# Patient Record
Sex: FEMALE | Race: WHITE | NOT HISPANIC OR LATINO | Employment: FULL TIME | URBAN - METROPOLITAN AREA
[De-identification: names, ages, dates, MRNs, and addresses within clinical notes are randomized per-mention and may not be internally consistent; named-entity substitution may affect disease eponyms.]

---

## 2017-01-02 ENCOUNTER — TELEPHONE (OUTPATIENT)
Dept: FAMILY MEDICINE | Facility: CLINIC | Age: 47
End: 2017-01-02

## 2017-01-02 DIAGNOSIS — N99.81 INTRAOPERATIVE BLADDER INJURY: Primary | ICD-10-CM

## 2017-01-02 NOTE — TELEPHONE ENCOUNTER
[facility]; f/u 12/31/16 Dx Urinary Incontinence, Dysmenorrhea, Menorrhagia, Post-Op Pain ED/IP 0/1  [phone] (home) none (work)

## 2017-01-03 ENCOUNTER — HOSPITAL ENCOUNTER (OUTPATIENT)
Dept: GENERAL RADIOLOGY | Facility: CLINIC | Age: 47
Discharge: HOME | End: 2017-01-03
Attending: UROLOGY | Admitting: UROLOGY
Payer: COMMERCIAL

## 2017-01-03 PROCEDURE — 74430 CONTRAST X-RAY BLADDER: CPT

## 2017-01-03 PROCEDURE — 25500064 ZZH RX 255 OP 636: Performed by: RADIOLOGY

## 2017-01-03 RX ADMIN — DIATRIZOATE MEGLUMINE 50 ML: 300 INJECTION, SOLUTION INTRAVENOUS at 15:26

## 2017-01-03 NOTE — TELEPHONE ENCOUNTER
ED / Discharge Outreach Protocol    Patient Contact    Attempt # none    Was call answered?  Planned surgery. Hysterectomy. Followed by OB/GYN  Tona Orellana RN

## 2017-01-04 ENCOUNTER — OFFICE VISIT (OUTPATIENT)
Dept: UROLOGY | Facility: CLINIC | Age: 47
End: 2017-01-04
Payer: COMMERCIAL

## 2017-01-04 VITALS
BODY MASS INDEX: 30.31 KG/M2 | SYSTOLIC BLOOD PRESSURE: 120 MMHG | HEART RATE: 64 BPM | WEIGHT: 200 LBS | HEIGHT: 68 IN | DIASTOLIC BLOOD PRESSURE: 80 MMHG

## 2017-01-04 DIAGNOSIS — N39.3 SUI (STRESS URINARY INCONTINENCE, FEMALE): Primary | ICD-10-CM

## 2017-01-04 DIAGNOSIS — N39.3 SUI (STRESS URINARY INCONTINENCE, FEMALE): ICD-10-CM

## 2017-01-04 PROCEDURE — 99214 OFFICE O/P EST MOD 30 MIN: CPT | Mod: 25 | Performed by: UROLOGY

## 2017-01-04 PROCEDURE — 52000 CYSTOURETHROSCOPY: CPT | Performed by: UROLOGY

## 2017-01-04 RX ORDER — CIPROFLOXACIN 500 MG/1
500 TABLET, FILM COATED ORAL DAILY
Qty: 1 TABLET | Refills: 0 | Status: SHIPPED | OUTPATIENT
Start: 2017-01-04 | End: 2017-01-18

## 2017-01-04 NOTE — PATIENT INSTRUCTIONS
"     AFTER YOUR CYSTOSCOPY         You have just completed a cystoscopy, or \"cysto\", which allowed your physician to learn more about your bladder (or to remove a stent placed after surgery). We suggest that you continue to avoid caffeine, fruit juice, and alcohol for the next 24 hours, however, you are encouraged to return to your normal activities.       A few things that are considered normal after your cystoscopy:    * small amount of bleeding (or spotting) that clears within the next 24 hours    * slight burning sensation with urination    * sensation to of needing to avoid more frequently    * the feeling of \"air\" in your urine    * mild discomfort that is relieved with Acetaminophen (Example:Tylenol)        Please contact our office promptly if you:    * develop a fever above 101 degrees    * are unable to urinate, during non-office hours seek Medical Attention.     "

## 2017-01-04 NOTE — Clinical Note
1/4/2017       RE: Crista Ma  [address] [place] [address]     Dear Colleague,    Thank you for referring your patient, Crista Ma, to the [facility] at [facility]. Please see a copy of my visit note below.    This very pleasant 46-year-old lady had a hysterectomy last week,which was complicated by a low bladder injury.  At the time I was able to pass ureteral catheters to exclude the possibility of ureteral injury, although I could not do retrograde pyelograms as the table the patient was on would not permit that.  I then was able to close this difficult hole low down from behind such that when we finished and filled the bladder with methylene blue there was no apparent leakage.  Subsequent to however she did begin to leak through the vagina we left the catheter  In, although I did take the ureteral catheters out about 3 days after surgery.  She now has a catheter in and is leaking and a cystogram today does show what appears to be a leak into the vagina and I therefore decided to cystoscope the patient to determine the precise location of the leak.    Procedure cystoscopy.with exchange of catheter  Surgeon    Barbara.  Anesthesia.  Local anesthesia  Description.  With the patient in the dorsal lithotomy position, and with the genital area prepped and draped in Kussman fashion, a flexible cystoscope was passed through the urethra which was intact into the bladder.  The interior the bladder was carefully inspected, the fistula tract could be seen just above the trigone on the posterior wall of the bladder, sutures could be seen immediately behind it.  I could palpate a hole in the vagina right at the tip of my finger on  Vaginal examination.  Conclusions.  She has a significant communication still between the bladder and the vagina.  I carefully explained the situation to the patient in detail.  I would recommend she  meet with Dr.Nisreen Boyd who has a great deal of expertise with the laparoscopic management of these difficult fistulas.  We'll arrange that as soon as possible.  I explained the entire situation carefully with the patient in detail today.  I answered many questions.  Plan.  To see Dr. Boyd for her opinion and management.    Time.  25 minutes was spent in addition to cystoscopy catheter exchange in order to discuss this very complex situation    Again, thank you for allowing me to participate in the care of your patient.      Sincerely,    Noel Jimenez MD

## 2017-01-04 NOTE — Clinical Note
Date:January 5, 2017      Patient was self referred, no letter generated. Do not send.        [facility] Information

## 2017-01-04 NOTE — PROGRESS NOTES
This very pleasant 46-year-old lady had a hysterectomy last week,which was complicated by a low bladder injury.  At the time I was able to pass ureteral catheters to exclude the possibility of ureteral injury, although I could not do retrograde pyelograms as the table the patient was on would not permit that.  I then was able to close this difficult hole low down from behind such that when we finished and filled the bladder with methylene blue there was no apparent leakage.  Subsequent to however she did begin to leak through the vagina we left the catheter  In, although I did take the ureteral catheters out about 3 days after surgery.  She now has a catheter in and is leaking and a cystogram today does show what appears to be a leak into the vagina and I therefore decided to cystoscope the patient to determine the precise location of the leak.    Procedure cystoscopy.with exchange of catheter  Surgeon    Barbara.  Anesthesia.  Local anesthesia  Description.  With the patient in the dorsal lithotomy position, and with the genital area prepped and draped in Kussman fashion, a flexible cystoscope was passed through the urethra which was intact into the bladder.  The interior the bladder was carefully inspected, the fistula tract could be seen just above the trigone on the posterior wall of the bladder, sutures could be seen immediately behind it.  I could palpate a hole in the vagina right at the tip of my finger on  Vaginal examination.  Conclusions.  She has a significant communication still between the bladder and the vagina.  I carefully explained the situation to the patient in detail.  I would recommend she meet with Dr.Nisreen Boyd who has a great deal of expertise with the laparoscopic management of these difficult fistulas.  We'll arrange that as soon as possible.  I explained the entire situation carefully with the patient in detail today.  I answered many questions.  Plan.  To see Dr. Boyd for her opinion  and management.    Time.  25 minutes was spent in addition to cystoscopy catheter exchange in order to discuss this very complex situation

## 2017-01-04 NOTE — MR AVS SNAPSHOT
"              After Visit Summary   1/4/2017    Crista Ma    MRN: [id number]           Patient Information     Date Of Birth          1970        Visit Information        Provider Department      1/4/2017 2:40 PM Noel Jimenez MD; [facility] [place]        Today's Diagnoses     HARRY (stress urinary incontinence, female)           Care Instructions         AFTER YOUR CYSTOSCOPY         You have just completed a cystoscopy, or \"cysto\", which allowed your physician to learn more about your bladder (or to remove a stent placed after surgery). We suggest that you continue to avoid caffeine, fruit juice, and alcohol for the next 24 hours, however, you are encouraged to return to your normal activities.       A few things that are considered normal after your cystoscopy:    * small amount of bleeding (or spotting) that clears within the next 24 hours    * slight burning sensation with urination    * sensation to of needing to avoid more frequently    * the feeling of \"air\" in your urine    * mild discomfort that is relieved with Acetaminophen (Example:Tylenol)        Please contact our office promptly if you:    * develop a fever above 101 degrees    * are unable to urinate, during non-office hours seek Medical Attention.           Follow-ups after your visit        Your next 10 appointments already scheduled     Jan 19, 2017 11:00 AM   XR CYSTOGRAM with SHXR5   [facility]iology ([facility])    [address] [place] [address] [phone]           Please bring a list of your current medicines to your exam. (Include vitamins, minerals and over-thecounter medicines.) Leave your valuables at home.  Tell your doctor if there is a chance you may be pregnant.  You do not need to do anything special for this exam.            Jan 19, 2017  1:00 PM   Return Visit with Noel Jimenez MD   [facility] " "[facility] ([facility])    [address]  [address]  [facility] [address] [phone]              Who to contact     If you have questions or need follow up information about today's clinic visit or your schedule please contact [facility] directly at [phone].  Normal or non-critical lab and imaging results will be communicated to you by MyChart, letter or phone within 4 business days after the clinic has received the results. If you do not hear from us within 7 days, please contact the clinic through [employer] or phone. If you have a critical or abnormal lab result, we will notify you by phone as soon as possible.  Submit refill requests through [employer] or call your pharmacy and they will forward the refill request to us. Please allow 3 business days for your refill to be completed.          Additional Information About Your Visit        [employer] Information     [employer] gives you secure access to your electronic health record. If you see a primary care provider, you can also send messages to your care team and make appointments. If you have questions, please call your primary care clinic.  If you do not have a primary care provider, please call [phone] and they will assist you.        Care EveryWhere ID     This is your Care EveryWhere ID. This could be used by other organizations to access your [place] medical records  [id number]        Your Vitals Were     Pulse Height BMI (Body Mass Index) Last Period          64 1.727 m (5' 8\") 30.42 kg/m2 12/06/2016 (Exact Date)         Blood Pressure from Last 3 Encounters:   01/04/17 120/80   12/31/16 100/61   06/02/16 112/76    Weight from Last 3 Encounters:   01/04/17 90.719 kg (200 lb)   12/31/16 98.7 kg (217 lb 9.5 oz)   06/02/16 96.48 kg (212 lb 11.2 oz)              We Performed the Following     UA without Microscopic [[id number]]        Primary Care Provider    None Specified       No " primary provider on file.        Thank you!     Thank you for choosing [facility] [place]  for your care. Our goal is always to provide you with excellent care. Hearing back from our patients is one way we can continue to improve our services. Please take a few minutes to complete the written survey that you may receive in the mail after your visit with us. Thank you!             Your Updated Medication List - Protect others around you: Learn how to safely use, store and throw away your medicines at www.disposemymeds.org.          This list is accurate as of: 1/4/17  4:50 PM.  Always use your most recent med list.                   Brand Name Dispense Instructions for use    albuterol 108 (90 BASE) MCG/ACT Inhaler   Generic drug:  albuterol      Inhale 1-2 puffs into the lungs every 4 hours as needed       budesonide-formoterol 160-4.5 MCG/ACT Inhaler    SYMBICORT     Inhale 1-2 puffs into the lungs 2 times daily       cyclobenzaprine 5 MG tablet    FLEXERIL    120 tablet    Take 1-2 tablets (5-10 mg) by mouth every 8 hours as needed for muscle spasms       opium-belladonna 30-16.2 MG per suppository    B&O SUPPRETTES    80 suppository    Place 1 suppository rectally every 6 hours as needed for moderate pain       oxyCODONE 5 MG IR tablet    ROXICODONE    60 tablet    Take 1-2 tablets (5-10 mg) by mouth every 3 hours as needed for moderate to severe pain       SINGULAIR PO      Take 10 mg by mouth At Bedtime       tolterodine 2 MG 24 hr capsule    DETROL LA    30 capsule    Take 1 capsule (2 mg) by mouth daily

## 2017-01-06 ENCOUNTER — TELEPHONE (OUTPATIENT)
Dept: UROLOGY | Facility: CLINIC | Age: 47
End: 2017-01-06

## 2017-01-06 ENCOUNTER — PRE VISIT (OUTPATIENT)
Dept: UROLOGY | Facility: CLINIC | Age: 47
End: 2017-01-06

## 2017-01-06 NOTE — TELEPHONE ENCOUNTER
[facility] Call Center    Phone Message    Name of Caller: Crista    Phone Number: Cell number on file:    Telephone Information:   Mobile [phone]       Best time to return call: ANY    May a detailed message be left on voicemail: yes    Relation to patient: Self    Reason for Call: Other: Call Back     Action Taken: Message routed to:  Adult Clinics: Urology p [id number]

## 2017-01-06 NOTE — TELEPHONE ENCOUNTER
PREVISIT INFORMATION                                                    Crista Ma scheduled for future visit at [facility] specialty clinics.    Patient is scheduled to see Dr. Boyd on 1/9/17  Reason for visit: Vesicovaginal fistula  Referring provider: Noel Jimenez   Has patient seen previous specialist? Yes  Medical Records:  Available in chart.  Patient was previously seen at a [facility] or [facility] facility.     REVIEW                                                      New patient packet mailed to patient: No  Medication reconciliation complete: No      PLAN/FOLLOW-UP NEEDED                                                      Patient Reminders Given:    Informed patient to bring an updated list of allergies, medications, pharmacy details and insurance information. Directed patient to come to the 2nd floor, check-in #4 for their appointment. Informed patient to call back if appointment needs to be cancelled or rescheduled at [phone].    Reminded patient to bring any outside records regarding this appointment or have them faxed to clinic at [phone].    Reminded patient to complete and bring in urology questionnaire. Also for patient to please come with a full bladder and to ask , if early to get staff member for sample.

## 2017-01-09 ENCOUNTER — HOSPITAL ENCOUNTER (EMERGENCY)
Facility: CLINIC | Age: 47
Discharge: HOME | End: 2017-01-09
Attending: EMERGENCY MEDICINE | Admitting: EMERGENCY MEDICINE
Payer: COMMERCIAL

## 2017-01-09 ENCOUNTER — OFFICE VISIT (OUTPATIENT)
Dept: UROLOGY | Facility: CLINIC | Age: 47
End: 2017-01-09
Payer: COMMERCIAL

## 2017-01-09 VITALS
TEMPERATURE: 97.7 F | HEIGHT: 68 IN | OXYGEN SATURATION: 96 % | SYSTOLIC BLOOD PRESSURE: 133 MMHG | DIASTOLIC BLOOD PRESSURE: 83 MMHG

## 2017-01-09 VITALS — DIASTOLIC BLOOD PRESSURE: 93 MMHG | SYSTOLIC BLOOD PRESSURE: 127 MMHG | HEART RATE: 97 BPM | TEMPERATURE: 97.1 F

## 2017-01-09 DIAGNOSIS — T83.9XXA FOLEY CATHETER PROBLEM, INITIAL ENCOUNTER (H): ICD-10-CM

## 2017-01-09 DIAGNOSIS — N82.0 VVF (VESICOVAGINAL FISTULA): Primary | ICD-10-CM

## 2017-01-09 PROCEDURE — 99244 OFF/OP CNSLTJ NEW/EST MOD 40: CPT | Mod: 25 | Performed by: UROLOGY

## 2017-01-09 PROCEDURE — 51702 INSERT TEMP BLADDER CATH: CPT

## 2017-01-09 PROCEDURE — 99282 EMERGENCY DEPT VISIT SF MDM: CPT

## 2017-01-09 PROCEDURE — 52000 CYSTOURETHROSCOPY: CPT | Mod: 58 | Performed by: UROLOGY

## 2017-01-09 PROCEDURE — 99283 EMERGENCY DEPT VISIT LOW MDM: CPT

## 2017-01-09 RX ORDER — CEFAZOLIN SODIUM 1 G/3ML
1 INJECTION, POWDER, FOR SOLUTION INTRAMUSCULAR; INTRAVENOUS SEE ADMIN INSTRUCTIONS
Status: CANCELLED | OUTPATIENT
Start: 2017-01-09

## 2017-01-09 ASSESSMENT — PAIN SCALES - GENERAL: PAINLEVEL: NO PAIN (0)

## 2017-01-09 ASSESSMENT — ENCOUNTER SYMPTOMS: FEVER: 0

## 2017-01-09 NOTE — ED AVS SNAPSHOT
Emergency Department    [address] [facility] [address]    Phone:  [phone]    Fax:  [phone]                                       Crista Ma   MRN: [id number]    Department:   Emergency Department   Date of Visit:  1/9/2017           Patient Information     Date Of Birth          1970        Your diagnoses for this visit were:     Neri catheter problem, initial encounter (H)        You were seen by Patricia Mcmanus MD.      Follow-up Information     Follow up with  Emergency Department.    Specialty:  EMERGENCY MEDICINE    Why:  If symptoms worsen    Contact information:    [address] [facility] [address] [phone]        Discharge Instructions       Make sure that it is continuing to drain overnight  If have problems tomorrow, call urology to have them look at the neri, otherwise return to emergency department    Future Appointments        Provider Department Dept Phone Center    1/19/2017 11:00 AM [facility] XRAY ROOM 5 [facility] Radiology [phone] [facility]    1/19/2017 1:00 PM Noel Jimenez MD [facility] [place] [phone] [facility]      24 Hour Appointment Hotline       To make an appointment at any [facility], call [phone] ([phone]). If you don't have a family doctor or clinic, we will help you find one. [place] clinics are conveniently located to serve the needs of you and your family.             Review of your medicines      Our records show that you are taking the medicines listed below. If these are incorrect, please call your family doctor or clinic.        Dose / Directions Last dose taken    albuterol 108 (90 BASE) MCG/ACT Inhaler   Dose:  1-2 puff   Generic drug:  albuterol        Inhale 1-2 puffs into the lungs every 4 hours as needed   Refills:  3        budesonide-formoterol 160-4.5 MCG/ACT Inhaler   Commonly known as:  SYMBICORT   Dose:  1-2  puff        Inhale 1-2 puffs into the lungs 2 times daily   Refills:  0        ciprofloxacin 500 MG tablet   Commonly known as:  CIPRO   Dose:  500 mg   Quantity:  1 tablet        Take 1 tablet (500 mg) by mouth daily   Refills:  0        cyclobenzaprine 5 MG tablet   Commonly known as:  FLEXERIL   Dose:  5-10 mg   Quantity:  120 tablet        Take 1-2 tablets (5-10 mg) by mouth every 8 hours as needed for muscle spasms   Refills:  1        opium-belladonna 30-16.2 MG per suppository   Commonly known as:  B&O SUPPRETTES   Dose:  30 mg   Quantity:  80 suppository        Place 1 suppository rectally every 6 hours as needed for moderate pain   Refills:  0        oxyCODONE 5 MG IR tablet   Commonly known as:  ROXICODONE   Dose:  5-10 mg   Quantity:  60 tablet        Take 1-2 tablets (5-10 mg) by mouth every 3 hours as needed for moderate to severe pain   Refills:  0        SINGULAIR PO   Dose:  10 mg        Take 10 mg by mouth At Bedtime   Refills:  0        tolterodine 2 MG 24 hr capsule   Commonly known as:  DETROL LA   Dose:  2 mg   Quantity:  30 capsule        Take 1 capsule (2 mg) by mouth daily   Refills:  0                Orders Needing Specimen Collection     None      Pending Results     No orders found from 1/8/2017 to 1/10/2017.            Pending Culture Results     No orders found from 1/8/2017 to 1/10/2017.             Test Results from your hospital stay            Clinical Quality Measure: Blood Pressure Screening     Your blood pressure was checked while you were in the emergency department today. The last reading we obtained was  BP: 133/83 mmHg . Please read the guidelines below about what these numbers mean and what you should do about them.  If your systolic blood pressure (the top number) is less than 120 and your diastolic blood pressure (the bottom number) is less than 80, then your blood pressure is normal. There is nothing more that you need to do about it.  If your systolic blood pressure (the  top number) is 120-139 or your diastolic blood pressure (the bottom number) is 80-89, your blood pressure may be higher than it should be. You should have your blood pressure rechecked within a year by a primary care provider.  If your systolic blood pressure (the top number) is 140 or greater or your diastolic blood pressure (the bottom number) is 90 or greater, you may have high blood pressure. High blood pressure is treatable, but if left untreated over time it can put you at risk for heart attack, stroke, or kidney failure. You should have your blood pressure rechecked by a primary care provider within the next 4 weeks.  If your provider in the emergency department today gave you specific instructions to follow-up with your doctor or provider even sooner than that, you should follow that instruction and not wait for up to 4 weeks for your follow-up visit.        Thank you for choosing [facility]       Thank you for choosing [facility] for your care. Our goal is always to provide you with excellent care. Hearing back from our patients is one way we can continue to improve our services. Please take a few minutes to complete the written survey that you may receive in the mail after you visit with us. Thank you!        [employer] Information     [employer] gives you secure access to your electronic health record. If you see a primary care provider, you can also send messages to your care team and make appointments. If you have questions, please call your primary care clinic.  If you do not have a primary care provider, please call [phone] and they will assist you.        Care EveryWhere ID     This is your Care EveryWhere ID. This could be used by other organizations to access your [place] [place] medical records  [id number]        After Visit Summary       This is your record. Keep this with you and show to your community pharmacist(s) and doctor(s) at your next visit.

## 2017-01-09 NOTE — ED AVS SNAPSHOT
Emergency Department    [address] [place] [address]    Phone:  [phone]    Fax:  [phone]                                       Crista Ma   MRN: [id number]    Department:   Emergency Department   Date of Visit:  1/9/2017           After Visit Summary Signature Page     I have received my discharge instructions, and my questions have been answered. I have discussed any challenges I see with this plan with the nurse or doctor.    ..........................................................................................................................................  Patient/Patient Representative Signature      ..........................................................................................................................................  Patient Representative Print Name and Relationship to Patient    ..................................................               ................................................  Date                                            Time    ..........................................................................................................................................  Reviewed by Signature/Title    ...................................................              ..............................................  Date                                                            Time

## 2017-01-09 NOTE — PATIENT INSTRUCTIONS
Please follow up with Dr. Boyd on 2/1/17 at the [facility]. Someone will contact you to schedule this appointment. If you have any questions regarding this appointment call [phone].      After Your Cystoscopy    What happens after the exam?    You may go back to your normal diet and activity as you feel ready, unless your doctor tells you not to.    For the next two days, you may notice:    Some blood in your urine  Some burning when you urinate (use the toilet)  An urge to urinate more often  Bladder spasms    These are normal after the procedure and should go away after a day or two.  To relieve these problems drink 6 to 8 large glasses of water each day (includes drinks at meals) as this will help clear the urine.  Take warm baths to relieve pain and bladder spasms.  Do not add anything to the bath water.  You may also take Tylenol (acetaminophen) for pain if needed.    When should I call my doctor?    A fever over 100F (38C) for more than a day. (Before you call the doctor, check your temperature under your tongue)  Chills  Failure to urinate: No urine comes out when you try to use the toilet. (Try soaking in a bathtub full of warm water. If still no urine, call your doctor)  A lot of blood in the urine, or blood clots larger than a nickel  Pain in the back or belly area (abdomen)  Pain or spasms that are not relieved by warm tub baths and pain medicine  Severe pain, burning or other problems while passing urine  Pain that gets worse after two days      Surgery Instructions    Always follow your surgeon s instructions. If you don t, your surgery could be cancelled. Please use the following checklist.  Your surgery is on: The surgery scheduler will contact you within 1 week of your consult with the surgeon. If you do not hear from them, please call the clinic or RN Care Coordinator for your provider.    Time: Prearrival times can vary depending on location/type of surgery.  [place] - 2 hour  pre-arrival  [place] - 1 hour pre-arrival    Note:  These times may change. A nurse will call you before surgery to confirm. If you have not received a call or if you have more questions, please call us on the working day before your surgery:  ? [place]: [phone] or [phone] (9am to 5:30pm)  ? [facility] [phone] (7am to 4pm)  Prior to surgery  ? Have a pre-op physical exam with your Primary Doctor within 30 days of surgery  - Ask your doctor to send all of your results to the surgery center/hospital before surgery. Your doctor also may ask you to bring the results with you on the day of surgery.  - Tell your doctor if:  - You are allergic to latex or rubber (latex and rubber gloves are often used in medical care).  - You are taking any medicines (including aspirin), vitamins, or herbal products. You may need to stop taking some medicines before surgery.  - You have any medical problems (allergies, diabetes, or heart disease, for example).  - You have a pacemaker or an AICD (automatic implanted cardiac defibrillator). If you do, please bring the ID card with you on the day of surgery.  - You are a smoker. People who smoke have a higher risk of infection after surgery. Ask your doctor how you can quit smoking.  - If you Primary Doctor is not within the [facility] system, you will need to have your pre-op physical faxed to us to be scanned into your chart.  - [place]: [phone] or [phone]  - [facility]): [phone]  ? Call your insurance company. Ask if you need pre-approval for your surgery. If you do not have insurance, please let us know. If you wish to speak to the , please alert the clinic staff so this can be arranged.  ? Arrange for someone to drive you home after surgery.  will need to be a responsible adult (18 years or older) that will provide transportation to and from surgery and stay in the waiting room during your surgery. You may  not drive yourself or take public transportation to and from surgery.  ? Arrange for someone to stay with you for 24 hours after you go home. This person must be a responsible adult (18 years or older).  ? Call your surgeon or their nurse if there is any change in your health (cold, flu, infections, hospitalizations).  ? Do not smoke, drink alcohol, or take over-the-counter medicine for 24 hours before and after surgery.  ? If you take prescribed drugs, you may need to stop them until after the surgery.  Discuss what medications to take or not take prior to surgery with your Primary Doctor at your pre-op physical. Avoid over-the-counter blood-thinning medications such as Aspirin, Ibuprofen, vitamin E, or fish oil 7 days prior to surgery (unless otherwise directed by your Primary Doctor). Tylenol is a good alternative for mild pain relief prior to surgery.  ? Eating and drinking guidelines prior to surgery:  - Stop all solid food consumption 8 hours prior to surgery  - You may drink clear liquids up to 2 hours prior to surgery (water, fruit juices without pulp, jello, tea/coffee without creamer, sports drinks, clear-fat free broth (bouillon or consomme), popsicles (without milk, bits of fruit, or seeds/nuts)  ? Follow instructions given for showering or bathing before surgery.    - Use 8 ounces of antiseptic surgical soap, like:  - Hibiclens, Scrub Care, or Exidine  - You can find it at your local pharmacy, clinic, or retail store. If you have trouble, ask your pharmacist to help you find the right substitute.  - Please wash with one of the above soaps twice before coming to the hospital for your surgery. This will decrease bacteria (germs) on your skin. It will also help reduce your chance of infection after surgery.  - Items you will need for showering:  - 4 newly washed washcloths  - 2 newly washed towels  - 8 ounces of one of the above soaps  - Following these instructions:  - The evening before surgery: Shower  or bathe as you normally would, using your regular soap and a clean washcloth. Give special attention to places where your incision (surgical cut) or catheters will be. This includes your groin area. Rinse well. You may wash your hair with your regular shampoo. Next, wash your body with 4 ounces of the antiseptic soap. Use a clean, damp washcloth and gently clean your body (from the chin down). If your surgery involves your head, use the special soap on your head and scalp. Rinse well and dry off using a newly washed towel.  - The morning of surgery: Repeat the same process as the evening shower.  - Other suggestions:    Do not shave within 12 inches of your incision (surgical cut) area for at least 3 days before surgery. Shaving can make small cuts in the skin. This puts you at higher risk of infection.    Wear freshly washed pajamas or clothing after your evening shower.    Wear freshly washed clothes the day of surgery.    Wash and change your bed sheets the day before surgery to have clean bed sheets after your shower and when you get home from surgery.    If you have trouble washing all areas, make sure someone helps you.    Don't use any deodorant, lotion or powder after your shower.    Women who are menstruating should wear a fresh sanitary pad to the hospital.  ? Do not wear or add deodorant, cologne, lotion, makeup, nail polish or jewelry to surgery. If you wear fake nails, please remove at least one nail before coming to surgery (an oxygen monitor needs to be placed on your finger during surgery).  ? Bring these items to the surgery center/hospital:  - Insurance card  - Money for parking and co-pays, if needed  - A list of all the medicines you take. Include vitamins, minerals, herbs, and over-the-counter drugs.  Note any drug allergies.  - A copy of your advance health care directive, if you have one. This tells us what treatment you would want--and who would make health care decisions--if you could no  longer speak for yourself. You may request this form in advance or download it from [email or web address].  - A case for glasses, contact lenses, hearing aids, or dentures.  - Your inhaler or CPAP machine, if you use these at home.  ? Leave extra cash, jewelry, and other valuables at home.  When you arrive  When you get to the surgery center/hospital, you will:  ? Check in. If you are under age 18, you must be with a parent or legal guardian.  ? Sign consent forms, if you haven t already. These forms state that you know the risks and benefits of surgery. When you sign the forms, you give us permission to do the surgery. Do not sign them unless you understand what will happen during and after your surgery. If you have any questions about your surgery, ask to speak with your doctor before you sign the forms. If you don t understand the answers, ask again.  ? Receive a copy of the Patient s Bill of Rights. If you do not receive a copy, please ask for one.  ? Change into hospital clothes. Your belongings will be placed in a bag. We will return them to you after surgery.  ? Meet with the anesthesia provider. He or she will tell you what kind of anesthesia (medicine) will be used to keep you comfortable during surgery.  Remember: it s okay to remind doctors and nurses to wash their hands before touching you.  In most cases, your surgeon will use a marker to write his or her initials on the surgery site. This ensures that the exact site is operated on.  For safety reasons, we will ask you the same questions many times. For example, we may ask your name and birth date over and over again.  Friends and family can stay with you until it s time for surgery. While you re in surgery, they will be in the waiting area. Please note that cell phones are not allowed in some patient care areas.  If you have questions about what will happen in the operating room, talk to your care team.  After surgery  We will move you to a recovery  room, where we will watch you closely. If you have any pain or discomfort, tell your nurse. He or she will try to make you comfortable.  If you are staying overnight, we will move you to your hospital room after you are awake.  If you are going home, we will move you to another room. Friends and family may be able to join you. The length of time you spend in recovery depend on the type of medicine you received, your medical condition, the type of surgery you had, or your response to the anesthesia given during your procedure.  When you are discharged from the recovery room, the nurses will review instructions with you and your caregiver.  ? Please wash your hands every time you touch the wound or change bandages or dressings.  ? Do not submerge the wound in water.  You may not use a bathtub or hot tub until the wound is closed. The wait time frame is generally 2-3 weeks, but any open area can be a source of incoming bacteria, so it is better to be on the safe side and avoid water submersion until your wound is fully healed.  ? You may take a shower 24 hours after surgery. Double check with your surgeon if it is OK for water to run over the wound, whether it has been sutured, stapled, glued, or is open. You may gently wash the wound using the antiseptic soap provided for your pre-surgery showering (do not use a washcloth). Any mild soap will work as well.  ? Many surgical wounds will have small white strips of tape on them called steri-strips.  Do not remove these. The edges will curl and fall off within 7-10 days with normal showering.  ? If you are going home with sutures (stitches) or staples, you must return to the clinic to have them taken out, usually within 1-2 weeks. Some stitches are dissolvable and do not require removal. Make sure to clarify with your surgeon or surgery nurse reviewing discharge paperwork what kind of sutures you have.  ? Signs and symptoms of infection include:  - Fever, temperature over  101.5   F  - Redness  - Swelling  - Increased pain  - Green or yellow drainage which may or may not have a foul odor  Dealing with pain  A nurse will check your comfort level often during your stay. He or she will work with you to manage your pain.  Remember:  ? All pain is real. There are many ways to control pain. We can help you decide what works best for you.  ? Ask for pain medicine when you need it. Don t try to  tough it out --this can make you feel worse. Always take your medicine as ordered.  ? Medicine doesn t work the same for everyone. If your medicine isn t working, tell your nurse. There may be other medicines or treatments we can try.  Going home  We will let you know when you re ready to leave the surgery center or hospital. Before you leave, we will tell you how to care for yourself at home and prevent infections. If you do not understand something, please say so. We will answer any questions you have. We will then help you get ready to leave.  Remember, you must have a responsible adult (18 years or older) to stay with you 24 hours after you leave the hospital.  Take it easy when you get home. You will need some time to recover--you may be more tired than you realize at first. Rest and relax for at least the first 24 hours at home. You ll feel better and heal faster if you take good care of yourself.  Follow the discharge instructions that are given to you when you leave the surgery center or hospital  Please call the clinic if you experience any problems during regular clinic hours (Monday-Friday 8:00am-5:00pm).  If you experience problems during non-clinic hours, please call the [facility] on-call line at [phone] and ask the  to page the on-call Provider for your specialty. The on-call Provider will call you back and can triage your symptoms and further advise. If you are having an emergency, always call 911 or seek immediate evaluation at the Emergency  Room.  Locations  [facility]  Same-day surgery center - 2nd floor, check-in #5  [address] [place] [address]  [phone]  [email or web address] [place] [place]  [address] [place] [id number]  [phone]   [email or web address]

## 2017-01-09 NOTE — NURSING NOTE
"Crista Ma's goals for this visit include:   Chief Complaint   Patient presents with     Consult     vesicovaginal fistula     She requests these members of her care team be copied on today's visit information: YES -    Referring Provider:  Noel Jimenez MD  [facility]  [address]  [place] [address]    Initial /93 mmHg  Pulse 97  Temp(Src) 97.1  F (36.2  C) (Oral)  LMP 12/06/2016 (Exact Date) Estimated body mass index is 30.42 kg/(m^2) as calculated from the following:    Height as of 1/4/17: 1.727 m (5' 8\").    Weight as of 1/4/17: 90.719 kg (200 lb).  BP completed using cuff size: large      "

## 2017-01-09 NOTE — PROGRESS NOTES
CC:  Vesicovaginal fistula    HPI:  Crista Ma is a 46 year old female asked to be seen in consultation by Dr. Jimenez for the above.  This problem has been going on shortly after a hysterectomy with incidental bladder injury and intraoperative repair.  She has been having a lot of leakage though the vagina and had a cystoscopy by Dr. Jimenez revealing the fistula.  She has otherwise been doing well and her pfannenstiel incision is healing well.  The fistula was described around the trigone area so we decided to perform cystoscopy today for better surgical planning.      Past Medical History   Diagnosis Date     Unspecified asthma(493.90)      on singulair, advair     Allergic rhinitis, cause unspecified      Pneumothorax      spontaneous x 4-as a child r/t pneumonia     Seasonal affective disorder (H)      prozac      Dysmenorrhea        Past Surgical History   Procedure Laterality Date     Hc enlarge breast with implant       [place] teeth       Laparoscopic assisted hysterectomy vaginal N/A 12/27/2016     Procedure: LAPAROSCOPIC ASSISTED HYSTERECTOMY VAGINAL;  Surgeon: Abebe Michelle MD;  Location:  OR     Laparoscopic salpingectomy Bilateral 12/27/2016     Procedure: LAPAROSCOPIC SALPINGECTOMY;  Surgeon: Abebe Michelle MD;  Location:  OR     Cystoscopy N/A 12/27/2016     Procedure: CYSTOSCOPY;  Surgeon: Abebe Michelle MD;  Location:  OR     Hysterectomy total abdominal, salpingectomy N/A 12/27/2016     Procedure: HYSTERECTOMY TOTAL ABDOMINAL, SALPINGECTOMY;  Surgeon: Abebe Michelle MD;  Location:  OR     Repair bladder N/A 12/27/2016     Procedure: REPAIR BLADDER;  Surgeon: Abebe Michelle MD;  Location:  OR       Meds, Allergies, FHx and SHx reviewed per nurse's intake note.    ROS is negative on a 14 point scale except for what is mentioned in the hpi.       PEx:   Blood pressure 127/93, pulse 97,  temperature 97.1  F (36.2  C), temperature source Oral, last menstrual period 12/06/2016.  Data Unavailable, There is no weight on file to calculate BMI., 0 lbs 0 oz  Gen appearance:  Well groomed  HEENT:  EOMI, AT NC  Psych:  Normal Affect  Neuro:  A/O X 3  Skin:  Warm to touch  Resp:  No increased respiratory effort  Vasc:  RRR  lymph:  No LE edema  Abd:  Soft/NT, ND, no palpable masses, pfannenstiel incision healing well.  Musk:  Full ROM in extremities  :  Normal external genitalia and introitus, no atrophic changes          Urethra normal  Perineum WNL.    Cystoscopy procedure:  Pt. Was consented and placed in the lithotomy position.  She was cleaned and preparred in the usual fashion.  Lidocain gel was inserted into the urethra and given time to take effect.  A 16 fr flexible cystoscope was then inserted through the urethra and into the bladder.  The urethra was wnl.  The bladder was with 1+ trabeculation.  No tumors, diverticulae, or stones.  There was some suture proximal to the trigone but there was no well delineated fistula.  Also there was a mass on the posterior bladder floor that is likely inflammatory in nature but will recheck when seen next.  Bilateral u/o's were effluxing clear urine.  The cystoscope was then withdrawn.  The pt. Tolerated the procedure well.    Vaginoscopy:  The scope was then inserted into the vagina and again the suture line could be seen at the vaginal cuff.        Admission on 12/27/2016, Discharged on 12/31/2016   Component Date Value Ref Range Status     HCG Qual Urine 12/27/2016 Negative  NEG Final     Copath Report 12/27/2016    Final                    Value:Patient Name: LESLIE CASTANEDA  MR#: [id number]  Specimen #: [id number]  Collected: 12/27/2016  Received: 12/27/2016  Reported: 12/28/2016 13:43  Ordering Phy(s): SUE MONTERO    For improved result formatting, select 'View Enhanced Report Format'  under Linked Documents section.    SPECIMEN(S):  A:  "Uterus, cervix, bilateral fallopian tubes  B: Infarcted appendix epiploica    FINAL DIAGNOSIS:  A. Uterus, cervix, and bilateral fallopian tubes, laparoscopic vaginal  hysterectomy and bilateral salpingectomy  - Cervix: No specific abnormality  - Endometrium: Weakly proliferative endometrium  - Myometrium:       - Adenomyosis       - Leiomyomata- Fallopian tubes, bilateral: No specific abnormality    B. Soft tissue, \"infarcted appendiceal epiploicae\", excision  - Adipose tissue with necrosis and scattered inflammation    Electronically signed out by:    Sonali Pollard M.D.    CLINICAL HISTORY:  46 year old with menorrhagia and stress incontinence.    GROSS:  A. The specim                          en is received in formalin with the patient's name and  proper identification labeled \"uterus, cervix, bilateral fallopian tubes  \".  The specimen consists of a 94 g pink rubbery uterus and cervix (9.6  x 5.2 x 4.0 cm).  The endometrial measures 0.2 cm in thickness.  There  multiple pink rubbery well-circumscribed leiomyomas.  The largest  leiomyoma measures up to 0.7 cm.  There also multiple areas of  myometrial induration that are sharply demarcated.  The largest area of  induration measures up to 0.7 cm.  Also in the container are two purple  fallopian tubes and fimbria ( 6.7 x 0.5 x 0.5 cm and 4.6 x 0.5 x 0.5  cm).  Representative sections are submitted.    Cassette 1-anterior cervix  Cassette 2-posterior cervix  Cassettes 3-4-uterine wall  Cassettes 5-6-fallopian tubes  Cassettes 7-myometrial induration  Cassettes 8-leiomyomas    B. The specimen is received in formalin with the patient's name and  proper identification labeled \"infarcted appendiceal epiploicae\".  The  specimen con                          sists of yellow smooth fatty mass ( 2.2 x 1.9 x 0.8 cm).  On  sections this has a yellow fatty center with red discoloration.  Representative sections are submitted in one cassette (Dictated by:  Juan Moya " 12/27/2016 02:21 PM)    MICROSCOPIC:  A formal microscopic examination was performed.    CPT Codes:  A: 49359-BV0  B: 22690-YT6    TESTING LAB LOCATION:  [address]  [id number] [phone]    COLLECTION SITE:  Client: [facility]  Location: [facility] (S)       Glucose 12/28/2016 153* 70 - 99 mg/dL Final     WBC 12/28/2016 8.3  4.0 - 11.0 10e9/L Final     RBC Count 12/28/2016 3.30* 3.8 - 5.2 10e12/L Final     Hemoglobin 12/28/2016 10.9* 11.7 - 15.7 g/dL Final     Hematocrit 12/28/2016 32.2* 35.0 - 47.0 % Final     MCV 12/28/2016 98  78 - 100 fl Final     MCH 12/28/2016 33.0  26.5 - 33.0 pg Final     MCHC 12/28/2016 33.9  31.5 - 36.5 g/dL Final     RDW 12/28/2016 13.4  10.0 - 15.0 % Final     Platelet Count 12/28/2016 185  150 - 450 10e9/L Final     Sodium 12/28/2016 140  133 - 144 mmol/L Final     Potassium 12/28/2016 4.5  3.4 - 5.3 mmol/L Final     Chloride 12/28/2016 105  94 - 109 mmol/L Final     Carbon Dioxide 12/28/2016 28  20 - 32 mmol/L Final     Anion Gap 12/28/2016 7  3 - 14 mmol/L Final     Glucose 12/28/2016 122* 70 - 99 mg/dL Final     Urea Nitrogen 12/28/2016 5* 7 - 30 mg/dL Final     Creatinine 12/28/2016 0.72  0.52 - 1.04 mg/dL Final     GFR Estimate 12/28/2016 87  >60 mL/min/1.7m2 Final    Non  GFR Calc     GFR Estimate If Black 12/28/2016   >60 mL/min/1.7m2 Final                    Value:>90   GFR Calc       Calcium 12/28/2016 8.3* 8.5 - 10.1 mg/dL Final     Creatinine 12/30/2016 0.72  0.52 - 1.04 mg/dL Final     GFR Estimate 12/30/2016 87  >60 mL/min/1.7m2 Final    Non  GFR Calc     GFR Estimate If Black 12/30/2016   >60 mL/min/1.7m2 Final                    Value:>90   GFR Calc           ASSESSMENT and PLAN:  This is a 46 year old female with a vesicovaginal fistula however not well delineated and incomplete healing at the moment.  Different management options were  discussed with the patient including observation and surgical correction.  Patient is interested in surgical correction however at the moment the tissue appears friable and would be very difficult to repair.  We discussed observing for now.  She is planning a trip to [place] on feb 11.  We will try to wait until after she returns to repair it robotically.  We discussed risk of infection, bleeding, injury to all and any structure in the abdomen or pelvis as well as recurrence of her fistula.  Pt. Verbalized understanding.  -The 20 fr catheter was very uncomfortable for the patient therefore we placed an 18 fr today  -f/u in 3 weeks to possibly remove the catheter and see how she does without it before going on her trip.    Thank you for allowing me to participate in Ms. Ma's care.  I will keep you updated on her progress.    Aubrey Boyd MD

## 2017-01-09 NOTE — MR AVS SNAPSHOT
After Visit Summary   1/9/2017    Crista Ma    MRN: [id number]           Patient Information     Date Of Birth          1970        Visit Information        Provider Department      1/9/2017 1:00 PM Aubrey Boyd MD [facility]        Care Instructions    Please follow up with Dr. Boyd on 2/1/17 at the [facility]. Someone will contact you to schedule this appointment. If you have any questions regarding this appointment call [phone].      After Your Cystoscopy    What happens after the exam?    You may go back to your normal diet and activity as you feel ready, unless your doctor tells you not to.    For the next two days, you may notice:    Some blood in your urine  Some burning when you urinate (use the toilet)  An urge to urinate more often  Bladder spasms    These are normal after the procedure and should go away after a day or two.  To relieve these problems drink 6 to 8 large glasses of water each day (includes drinks at meals) as this will help clear the urine.  Take warm baths to relieve pain and bladder spasms.  Do not add anything to the bath water.  You may also take Tylenol (acetaminophen) for pain if needed.    When should I call my doctor?    A fever over 100F (38C) for more than a day. (Before you call the doctor, check your temperature under your tongue)  Chills  Failure to urinate: No urine comes out when you try to use the toilet. (Try soaking in a bathtub full of warm water. If still no urine, call your doctor)  A lot of blood in the urine, or blood clots larger than a nickel  Pain in the back or belly area (abdomen)  Pain or spasms that are not relieved by warm tub baths and pain medicine  Severe pain, burning or other problems while passing urine  Pain that gets worse after two days      Surgery Instructions    Always follow your surgeon s instructions. If you don t, your surgery could be cancelled. Please use the following  checklist.  Your surgery is on: The surgery scheduler will contact you within 1 week of your consult with the surgeon. If you do not hear from them, please call the clinic or RN Care Coordinator for your provider.    Time: Prearrival times can vary depending on location/type of surgery.  [place] - 2 hour pre-arrival  [place] - 1 hour pre-arrival    Note:  These times may change. A nurse will call you before surgery to confirm. If you have not received a call or if you have more questions, please call us on the working day before your surgery:  ? [place]: [phone] or [phone] (9am to 5:30pm)  ? [facility] [phone] (7am to 4pm)  Prior to surgery  ? Have a pre-op physical exam with your Primary Doctor within 30 days of surgery  - Ask your doctor to send all of your results to the surgery center/hospital before surgery. Your doctor also may ask you to bring the results with you on the day of surgery.  - Tell your doctor if:  - You are allergic to latex or rubber (latex and rubber gloves are often used in medical care).  - You are taking any medicines (including aspirin), vitamins, or herbal products. You may need to stop taking some medicines before surgery.  - You have any medical problems (allergies, diabetes, or heart disease, for example).  - You have a pacemaker or an AICD (automatic implanted cardiac defibrillator). If you do, please bring the ID card with you on the day of surgery.  - You are a smoker. People who smoke have a higher risk of infection after surgery. Ask your doctor how you can quit smoking.  - If you Primary Doctor is not within the [email or web address] system, you will need to have your pre-op physical faxed to us to be scanned into your chart.  - [place]: [phone] or [phone]  - [facility] ([place]): [phone]  ? Call your insurance company. Ask if you need pre-approval for your surgery. If you do not have insurance, please let us know. If you wish to speak to  the , please alert the clinic staff so this can be arranged.  ? Arrange for someone to drive you home after surgery.  will need to be a responsible adult (18 years or older) that will provide transportation to and from surgery and stay in the waiting room during your surgery. You may not drive yourself or take public transportation to and from surgery.  ? Arrange for someone to stay with you for 24 hours after you go home. This person must be a responsible adult (18 years or older).  ? Call your surgeon or their nurse if there is any change in your health (cold, flu, infections, hospitalizations).  ? Do not smoke, drink alcohol, or take over-the-counter medicine for 24 hours before and after surgery.  ? If you take prescribed drugs, you may need to stop them until after the surgery.  Discuss what medications to take or not take prior to surgery with your Primary Doctor at your pre-op physical. Avoid over-the-counter blood-thinning medications such as Aspirin, Ibuprofen, vitamin E, or fish oil 7 days prior to surgery (unless otherwise directed by your Primary Doctor). Tylenol is a good alternative for mild pain relief prior to surgery.  ? Eating and drinking guidelines prior to surgery:  - Stop all solid food consumption 8 hours prior to surgery  - You may drink clear liquids up to 2 hours prior to surgery (water, fruit juices without pulp, jello, tea/coffee without creamer, sports drinks, clear-fat free broth (bouillon or consomme), popsicles (without milk, bits of fruit, or seeds/nuts)  ? Follow instructions given for showering or bathing before surgery.    - Use 8 ounces of antiseptic surgical soap, like:  - Hibiclens, Scrub Care, or Exidine  - You can find it at your local pharmacy, clinic, or retail store. If you have trouble, ask your pharmacist to help you find the right substitute.  - Please wash with one of the above soaps twice before coming to the hospital for your surgery. This will  decrease bacteria (germs) on your skin. It will also help reduce your chance of infection after surgery.  - Items you will need for showering:  - 4 newly washed washcloths  - 2 newly washed towels  - 8 ounces of one of the above soaps  - Following these instructions:  - The evening before surgery: Shower or bathe as you normally would, using your regular soap and a clean washcloth. Give special attention to places where your incision (surgical cut) or catheters will be. This includes your groin area. Rinse well. You may wash your hair with your regular shampoo. Next, wash your body with 4 ounces of the antiseptic soap. Use a clean, damp washcloth and gently clean your body (from the chin down). If your surgery involves your head, use the special soap on your head and scalp. Rinse well and dry off using a newly washed towel.  - The morning of surgery: Repeat the same process as the evening shower.  - Other suggestions:    Do not shave within 12 inches of your incision (surgical cut) area for at least 3 days before surgery. Shaving can make small cuts in the skin. This puts you at higher risk of infection.    Wear freshly washed pajamas or clothing after your evening shower.    Wear freshly washed clothes the day of surgery.    Wash and change your bed sheets the day before surgery to have clean bed sheets after your shower and when you get home from surgery.    If you have trouble washing all areas, make sure someone helps you.    Don't use any deodorant, lotion or powder after your shower.    Women who are menstruating should wear a fresh sanitary pad to the hospital.  ? Do not wear or add deodorant, cologne, lotion, makeup, nail polish or jewelry to surgery. If you wear fake nails, please remove at least one nail before coming to surgery (an oxygen monitor needs to be placed on your finger during surgery).  ? Bring these items to the surgery center/hospital:  - Insurance card  - Money for parking and co-pays, if  needed  - A list of all the medicines you take. Include vitamins, minerals, herbs, and over-the-counter drugs.  Note any drug allergies.  - A copy of your advance health care directive, if you have one. This tells us what treatment you would want--and who would make health care decisions--if you could no longer speak for yourself. You may request this form in advance or download it from [email or web address].  - A case for glasses, contact lenses, hearing aids, or dentures.  - Your inhaler or CPAP machine, if you use these at home.  ? Leave extra cash, jewelry, and other valuables at home.  When you arrive  When you get to the surgery center/hospital, you will:  ? Check in. If you are under age 18, you must be with a parent or legal guardian.  ? Sign consent forms, if you haven t already. These forms state that you know the risks and benefits of surgery. When you sign the forms, you give us permission to do the surgery. Do not sign them unless you understand what will happen during and after your surgery. If you have any questions about your surgery, ask to speak with your doctor before you sign the forms. If you don t understand the answers, ask again.  ? Receive a copy of the Patient s Bill of Rights. If you do not receive a copy, please ask for one.  ? Change into hospital clothes. Your belongings will be placed in a bag. We will return them to you after surgery.  ? Meet with the anesthesia provider. He or she will tell you what kind of anesthesia (medicine) will be used to keep you comfortable during surgery.  Remember: it s okay to remind doctors and nurses to wash their hands before touching you.  In most cases, your surgeon will use a marker to write his or her initials on the surgery site. This ensures that the exact site is operated on.  For safety reasons, we will ask you the same questions many times. For example, we may ask your name and birth date over and over again.  Friends and family can stay  with you until it s time for surgery. While you re in surgery, they will be in the waiting area. Please note that cell phones are not allowed in some patient care areas.  If you have questions about what will happen in the operating room, talk to your care team.  After surgery  We will move you to a recovery room, where we will watch you closely. If you have any pain or discomfort, tell your nurse. He or she will try to make you comfortable.  If you are staying overnight, we will move you to your hospital room after you are awake.  If you are going home, we will move you to another room. Friends and family may be able to join you. The length of time you spend in recovery depend on the type of medicine you received, your medical condition, the type of surgery you had, or your response to the anesthesia given during your procedure.  When you are discharged from the recovery room, the nurses will review instructions with you and your caregiver.  ? Please wash your hands every time you touch the wound or change bandages or dressings.  ? Do not submerge the wound in water.  You may not use a bathtub or hot tub until the wound is closed. The wait time frame is generally 2-3 weeks, but any open area can be a source of incoming bacteria, so it is better to be on the safe side and avoid water submersion until your wound is fully healed.  ? You may take a shower 24 hours after surgery. Double check with your surgeon if it is OK for water to run over the wound, whether it has been sutured, stapled, glued, or is open. You may gently wash the wound using the antiseptic soap provided for your pre-surgery showering (do not use a washcloth). Any mild soap will work as well.  ? Many surgical wounds will have small white strips of tape on them called steri-strips.  Do not remove these. The edges will curl and fall off within 7-10 days with normal showering.  ? If you are going home with sutures (stitches) or staples, you must return  to the clinic to have them taken out, usually within 1-2 weeks. Some stitches are dissolvable and do not require removal. Make sure to clarify with your surgeon or surgery nurse reviewing discharge paperwork what kind of sutures you have.  ? Signs and symptoms of infection include:  - Fever, temperature over 101.5   F  - Redness  - Swelling  - Increased pain  - Green or yellow drainage which may or may not have a foul odor  Dealing with pain  A nurse will check your comfort level often during your stay. He or she will work with you to manage your pain.  Remember:  ? All pain is real. There are many ways to control pain. We can help you decide what works best for you.  ? Ask for pain medicine when you need it. Don t try to  tough it out --this can make you feel worse. Always take your medicine as ordered.  ? Medicine doesn t work the same for everyone. If your medicine isn t working, tell your nurse. There may be other medicines or treatments we can try.  Going home  We will let you know when you re ready to leave the surgery center or hospital. Before you leave, we will tell you how to care for yourself at home and prevent infections. If you do not understand something, please say so. We will answer any questions you have. We will then help you get ready to leave.  Remember, you must have a responsible adult (18 years or older) to stay with you 24 hours after you leave the hospital.  Take it easy when you get home. You will need some time to recover--you may be more tired than you realize at first. Rest and relax for at least the first 24 hours at home. You ll feel better and heal faster if you take good care of yourself.  Follow the discharge instructions that are given to you when you leave the surgery center or hospital  Please call the clinic if you experience any problems during regular clinic hours (Monday-Friday 8:00am-5:00pm).  If you experience problems during non-clinic hours, please call the [facility] on-call line at [phone] and ask the  to page the on-call Provider for your specialty. The on-call Provider will call you back and can triage your symptoms and further advise. If you are having an emergency, always call 911 or seek immediate evaluation at the Emergency Room.  Locations  [facility]  Same-day surgery center - 2nd floor, check-in #5  [address] [place] [address]  [phone]  [email or web address]  [address] [place] [address]  [phone]   [email or web address]                      Follow-ups after your visit        Follow-up notes from your care team     Return for surgery.      Your next 10 appointments already scheduled     Jan 19, 2017 11:00 AM   XR CYSTOGRAM with SHXR5   [facility] ([facility])    [address] [address] [phone]           Please bring a list of your current medicines to your exam. (Include vitamins, minerals and over-thecounter medicines.) Leave your valuables at home.  Tell your doctor if there is a chance you may be pregnant.  You do not need to do anything special for this exam.            Jan 19, 2017  1:00 PM   Return Visit with Noel Jimenez MD   [facility] ([facility])    [address]  [address]  [place] [address] [phone]              Who to contact     If you have questions or need follow up information about today's clinic visit or your schedule please contact [facility] directly at [phone].  Normal or non-critical lab and imaging results will be communicated to you by MyChart, letter or phone within 4 business days after the clinic has received the results. If you do not hear from us within 7 days, please  contact the clinic through [facility] or phone. If you have a critical or abnormal lab result, we will notify you by phone as soon as possible.  Submit refill requests through [facility] or call your pharmacy and they will forward the refill request to us. Please allow 3 business days for your refill to be completed.          Additional Information About Your Visit        [employer] Information     [facility] gives you secure access to your electronic health record. If you see a primary care provider, you can also send messages to your care team and make appointments. If you have questions, please call your primary care clinic.  If you do not have a primary care provider, please call [phone] and they will assist you.      [facility] is an electronic gateway that provides easy, online access to your medical records. With [facility], you can request a clinic appointment, read your test results, renew a prescription or communicate with your care team.     To access your existing account, please contact your [facility] or call [phone] for assistance.        Care EveryWhere ID     This is your Care EveryWhere ID. This could be used by other organizations to access your [facility] medical records  [id number]        Your Vitals Were     Pulse Temperature Last Period             97 97.1  F (36.2  C) (Oral) 12/06/2016 (Exact Date)          Blood Pressure from Last 3 Encounters:   01/09/17 127/93   01/04/17 120/80   12/31/16 100/61    Weight from Last 3 Encounters:   01/04/17 90.719 kg (200 lb)   12/31/16 98.7 kg (217 lb 9.5 oz)   06/02/16 96.48 kg (212 lb 11.2 oz)              Today, you had the following     No orders found for display       Primary Care Provider    None Specified       No primary provider on file.        Thank you!     Thank you for choosing [facility]  for your care. Our goal is always to provide you with excellent care. Hearing back from our patients is one way we  can continue to improve our services. Please take a few minutes to complete the written survey that you may receive in the mail after your visit with us. Thank you!             Your Updated Medication List - Protect others around you: Learn how to safely use, store and throw away your medicines at www.disposemymeds.org.          This list is accurate as of: 1/9/17  2:35 PM.  Always use your most recent med list.                   Brand Name Dispense Instructions for use    albuterol 108 (90 BASE) MCG/ACT Inhaler   Generic drug:  albuterol      Inhale 1-2 puffs into the lungs every 4 hours as needed       budesonide-formoterol 160-4.5 MCG/ACT Inhaler    SYMBICORT     Inhale 1-2 puffs into the lungs 2 times daily       ciprofloxacin 500 MG tablet    CIPRO    1 tablet    Take 1 tablet (500 mg) by mouth daily       cyclobenzaprine 5 MG tablet    FLEXERIL    120 tablet    Take 1-2 tablets (5-10 mg) by mouth every 8 hours as needed for muscle spasms       opium-belladonna 30-16.2 MG per suppository    B&O SUPPRETTES    80 suppository    Place 1 suppository rectally every 6 hours as needed for moderate pain       oxyCODONE 5 MG IR tablet    ROXICODONE    60 tablet    Take 1-2 tablets (5-10 mg) by mouth every 3 hours as needed for moderate to severe pain       SINGULAIR PO      Take 10 mg by mouth At Bedtime       tolterodine 2 MG 24 hr capsule    DETROL LA    30 capsule    Take 1 capsule (2 mg) by mouth daily

## 2017-01-10 ENCOUNTER — ALLIED HEALTH/NURSE VISIT (OUTPATIENT)
Dept: UROLOGY | Facility: CLINIC | Age: 47
End: 2017-01-10
Payer: COMMERCIAL

## 2017-01-10 DIAGNOSIS — N82.0 VESICO-VAGINAL FISTULA: Primary | ICD-10-CM

## 2017-01-10 DIAGNOSIS — N36.0 URINARY FISTULA: Primary | ICD-10-CM

## 2017-01-10 DIAGNOSIS — N82.1 URINARY-GENITAL TRACT FISTULA, FEMALE: Primary | ICD-10-CM

## 2017-01-10 PROCEDURE — 51702 INSERT TEMP BLADDER CATH: CPT | Performed by: UROLOGY

## 2017-01-10 NOTE — NURSING NOTE
Pt here for cath placement.  Pt had cath that fell out today.   i cleaned her with iodine, and a 16fr cath placed with some problems. Lube caused it tough to hold open and place the cath.   i had help with holding vagina open and a cath was placed.  Pt said she it felt good and had no pain.  Balloon filled with 10ccs and 30ccs was instilled into the bladder and that drained out.  Pt up and dressed without pain/problems.  Pt will call us if needed.  THELMA Duffy, CMA

## 2017-01-10 NOTE — ED PROVIDER NOTES
"  History     Chief Complaint:  Catheter Problem    HPI   Crista Ma is a 46 year old female with a history of stress urinary incontinence and vesicovaginal fistula who presents to the emergency department today for evaluation of a catheter problem. The patient had a cytoscopy and vaginoscopy performed today in which her catheter was temporarily removed and then replaced again after the procedure. She states that it does not feel as though it is in the correct location since she has had \"only a teaspoon\" of urine in her leg bag since 2 PM today and she has had urine leaking out around the catheter and out of her vagina due to her fistula that extends from the bottom of the bladder through the vaginal wall. The patient has no fever. She had a Garcia catheter replaced here and the patient had relief of pain and pressure since that time. She is scheduled to see her OBGYN, Dr. Michelle, tomorrow morning at 7:30.    Allergies:  No Known Drug Allergies     Medications:    Oxycodone   Flexeril   B&O Supprettes   Detrol   Singulair  Symbicort  Albuterol    Past Medical History:    Unspecified asthma  Allergic rhinitis   Pneumothorax   Seasonal affective disorder   Dysmenorrhea  Vesicovaginal fistula   Stress urinary incontinence      Past Surgical History:    Enlarge breast with implant   [place] teeth   Laparoscopic assisted hysterectomy vaginal (12/27/2016)   Laparoscopic salpingectomy (12/27/2016)   Cystoscopy (12/27/2016)   Hysterectomy total abdominal, salpingectomy (12/27/2016)   Repair bladder (12/27/2016)      Family History:    History reviewed. No pertinent family history.      Social History:  The patient was unaccompanied to the ED.  Smoking Status: Negative  Alcohol Use: Positive   Marital Status:   [4]     Review of Systems   Constitutional: Negative for fever.   Genitourinary:        Catheter problem   All other systems reviewed and are negative.    Physical Exam   Vitals:   Patient Vitals for the " "past 24 hrs:   BP Temp Temp src Heart Rate SpO2 Height   01/09/17 1926 133/83 mmHg 97.7  F (36.5  C) Oral 106 96 % 1.727 m (5' 8\")      Physical Exam    General: Resting comfortably on the gurney  Eyes:  The pupils are equal and round  ENT:    Atraumatic  Neck:  Normal range of motion  CV:  Tachycardic rate and rhythm    Skin warm and well perfused   Resp:  Lungs are clear    Non-labored    No rales    No wheezing   GI:  Abdomen is soft, there is no rigidity    No distension    No rebound tenderness     No abdominal tenderness  MS:  Normal muscular tone  Skin:  Well healed lower abdominal incision  Neuro:   Awake, alert.      Speech is normal and fluent.    Face is symmetric.     Moves all extremities  Psych:  Normal affect.  Appropriate interactions.    Emergency Department Course     Nursing notes and vitals reviewed.  I performed an exam of the patient as documented above.   I discussed the treatment plan with the patient. They expressed understanding of this plan and consented to discharge. They will be discharged home with instructions for care and follow up. In addition, the patient will return to the emergency department if their symptoms persist, worsen, if new symptoms arise or if there is any concern.  All questions were answered.    Impression & Plan      Medical Decision Making:  Crista Ma is a 46 year old female who presents to the emergency department with a catheter problem. She had a complication from her recent surgery and now has a vesicovaginal fistula and has been following with urology. Urology is planning to repair the fistula in a few weeks. They placed a Garcia today but after the Garcia was placed she went home and noticed leakage of urine through her vagina along with increase in pressure in her lower abdomen. Has had leakage of urine from vagina since surgery but more this afternoon than usual. Before my evaluation, ED nurse examined the Garcia and the Garcia appeared to be going into her " vagina and not into her urethra. There was no drainage from her neri at that time. This Neri was removed and a new Neri was placed into her urethra and there was good urine flow. Patient's pressure in her lower abdomen resolved. Now urine flowing well. She's had no infectious symptoms to suggest infection and will have her follow up with urology and she actually has an appointment with OBGYN tomorrow. Reasons to return to the emergency department were discussed.      Diagnosis:    ICD-10-CM    1. Neri catheter problem, initial encounter (H) T83.9XXA      Disposition:   Discharge to home    Scribe Disclosure:  I, Dena Fatima, am serving as a scribe at 7:37 PM on 1/9/2017 to document services personally performed by Patricia Mcmanus MD, based on my observations and the provider's statements to me.    1/9/2017    EMERGENCY DEPARTMENT        Patricia Mcmanus MD  01/10/17 0025

## 2017-01-10 NOTE — DISCHARGE INSTRUCTIONS
Make sure that it is continuing to drain overnight  If have problems tomorrow, call urology to have them look at the neri, otherwise return to emergency department

## 2017-01-10 NOTE — ED NOTES
Pt had hysterectomy Tuesday December 27th and had bladder perforated during procedure.  Pt had catheter placed by urologist today and is not having much urine in drainage bag, pt has symptoms of fullness and pain .  Pt does leak urine out vagina as well.

## 2017-01-13 DIAGNOSIS — N82.0 VVF (VESICOVAGINAL FISTULA): Primary | ICD-10-CM

## 2017-01-18 ENCOUNTER — OFFICE VISIT (OUTPATIENT)
Dept: UROLOGY | Facility: CLINIC | Age: 47
End: 2017-01-18
Payer: COMMERCIAL

## 2017-01-18 ENCOUNTER — TELEPHONE (OUTPATIENT)
Dept: NURSING | Facility: CLINIC | Age: 47
End: 2017-01-18

## 2017-01-18 VITALS
DIASTOLIC BLOOD PRESSURE: 83 MMHG | HEART RATE: 101 BPM | OXYGEN SATURATION: 96 % | SYSTOLIC BLOOD PRESSURE: 126 MMHG | RESPIRATION RATE: 18 BRPM | TEMPERATURE: 97.5 F

## 2017-01-18 DIAGNOSIS — N32.89 BLADDER SPASMS: Primary | ICD-10-CM

## 2017-01-18 DIAGNOSIS — N32.89 BLADDER SPASMS: ICD-10-CM

## 2017-01-18 DIAGNOSIS — T83.9XXD FOLEY CATHETER PROBLEM, SUBSEQUENT ENCOUNTER: Primary | ICD-10-CM

## 2017-01-18 DIAGNOSIS — R82.90 NONSPECIFIC FINDING ON EXAMINATION OF URINE: ICD-10-CM

## 2017-01-18 DIAGNOSIS — N82.0 VVF (VESICOVAGINAL FISTULA): ICD-10-CM

## 2017-01-18 LAB
ALBUMIN UR-MCNC: NEGATIVE MG/DL
APPEARANCE UR: CLEAR
BACTERIA #/AREA URNS HPF: ABNORMAL /HPF
BILIRUB UR QL STRIP: NEGATIVE
COLOR UR AUTO: ABNORMAL
GLUCOSE UR STRIP-MCNC: NEGATIVE MG/DL
HGB UR QL STRIP: ABNORMAL
KETONES UR STRIP-MCNC: NEGATIVE MG/DL
LEUKOCYTE ESTERASE UR QL STRIP: ABNORMAL
NITRATE UR QL: POSITIVE
PH UR STRIP: 8 PH (ref 5–7)
RBC #/AREA URNS AUTO: ABNORMAL /HPF (ref 0–2)
SP GR UR STRIP: 1.01 (ref 1–1.03)
URN SPEC COLLECT METH UR: ABNORMAL
UROBILINOGEN UR STRIP-MCNC: NORMAL MG/DL (ref 0–2)
WBC #/AREA URNS AUTO: ABNORMAL /HPF (ref 0–2)

## 2017-01-18 PROCEDURE — 87186 SC STD MICRODIL/AGAR DIL: CPT | Performed by: PHYSICIAN ASSISTANT

## 2017-01-18 PROCEDURE — 81001 URINALYSIS AUTO W/SCOPE: CPT | Performed by: PHYSICIAN ASSISTANT

## 2017-01-18 PROCEDURE — 87086 URINE CULTURE/COLONY COUNT: CPT | Performed by: PHYSICIAN ASSISTANT

## 2017-01-18 PROCEDURE — 99213 OFFICE O/P EST LOW 20 MIN: CPT | Performed by: PHYSICIAN ASSISTANT

## 2017-01-18 PROCEDURE — 87088 URINE BACTERIA CULTURE: CPT | Performed by: PHYSICIAN ASSISTANT

## 2017-01-18 RX ORDER — OXYBUTYNIN CHLORIDE 5 MG/1
5 TABLET ORAL 3 TIMES DAILY
Qty: 90 TABLET | Refills: 1 | Status: SHIPPED | OUTPATIENT
Start: 2017-01-18 | End: 2017-01-27 | Stop reason: ALTCHOICE

## 2017-01-18 ASSESSMENT — PAIN SCALES - GENERAL: PAINLEVEL: SEVERE PAIN (6)

## 2017-01-18 NOTE — PROGRESS NOTES
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  She had been having a lot of leakage though the vagina and had a cystoscopy by Dr. Jimenez revealing the fistula which was described around the trigone area. She then saw Dr. Boyd on 1/9/17 where repeat cystoscopy was performed revealing no well delineated fistula. Vaginoscopy again revealed the suture line at the vaginal cuff. Dr. Boyd and the patient had discussed observation versus surgical correction and the patient opted for the latter. Given that the tissue was very friable, surgery was planned for mid-February. An 18 Fr Garcia catheter was left in place and plan was to follow up in 3 weeks to possibly remove the catheter. Later that same day she noticed that the catheter was not draining any urine so presented to the ED where this was removed and a new Garcia catheter was placed with return of good urine flow. Unfortunately, this catheter then \"popped out\" the following day while she was straining with a BM. A new Garcia was placed once again (with some difficulty) by nursing staff at the [place] [facility] on 1/10/17. This has been draining well, although she continues to get some leakage of urine through the vagina. She returns to clinic today as she has noticed some worsening bladder spasms with increasing leakage over the past 1-2 days, as well as a small amount of blood coming from around the catheter. This is scant and intermittent. The urine has otherwise been clear yellow.    PEx:  /83 mmHg  Pulse 101  Temp(Src) 97.5  F (36.4  C) (Oral)  Resp 18  SpO2 96%  LMP 12/06/2016 (Exact Date)  Gen appearance:  Well groomed  Skin:  Warm to touch  Resp:  No increased respiratory effort  Abd:  Soft/NT, ND, no palpable masses, pfannenstiel incision healing " well.  Musk:  Full ROM in extremities  :  Normal external genitalia and introitus, no atrophic changes. No blood evident in vaginal vault.          Urethra normal with 16 Fr Garcia catheter in place draining clear, yellow urine. No blood evident around urethral meatus. One small wisp of blood clot in Garcia bag.  Perineum WNL.    Garcia clamped and specimen collected for UA/UC.  UA demonstrates trace blood, + nitrite, small LE, 2-5 WBC, 0-2 RBC, moderate bacteria, o/w wnl.    ASSESSMENT and PLAN:  This is a 46 year old female with a vesicovaginal fistula - robotic surgical repair planned for 2/14/17 with Dr. Boyd. Garcia catheter has remained in place to allow bladder decompression and hopefully friable tissue to heal more prior to surgery. Has had a few issues with her catheter not draining and then popping out, but the 16 Fr Garcia she has in currently seems to be draining well with clear, yellow urine. Only 1 small wisp of clot noted in Garcia bag, but no blood noted in vaginal vault or around urethral meatus. We did obtain a specimen to be sent for UA/UC. UA is not overly indicative of infection - will await urine culture results prior to initiating treatment.    She has possibly been taking Detrol LA 2 mg daily - pt unsure. As she has had worsening bladder spasms and urine leakage, will have her stop this and start oxybutynin 5 mg TID PRN instead. Rx sent and side effects discussed.  - Dr. Boyd had recommended f/u the last week of January to possibly remove Garcia catheter. Patient states she is expecting a call from Dr. Boyd's nurse coordinator to schedule this. Informed her to call clinic if she has not heard from someone by the end of this week regarding that follow up appointment.    Thank you for allowing me to participate in Ms. Ma's care.  I will keep you updated on her progress.    Rosita Quintanilla PA-C  Department of Urology

## 2017-01-18 NOTE — MR AVS SNAPSHOT
After Visit Summary   1/18/2017    Crista Ma    MRN: [id number]           Patient Information     Date Of Birth          1970        Visit Information        Provider Department      1/18/2017 1:00 PM Rosita Quintanilla PA-C [facility]        Today's Diagnoses     Bladder spasms    -  1       Care Instructions    We will check your urine for infection. This result should be available within 1-2 days. I will call you if we need to give you an antibiotic.    A little bit of blood in the urine/around the catheter is not concerning. However, if it worsens significantly or if your catheter stops draining well, please give us a call.    Please start taking oxybutynin (Ditropan) 5 mg. You may take this 3 times daily as needed. Check your pill bottles when you get home - if you are taking tolterodine (Detrol), please STOP taking this and take the oxybutynin instead. We do not want to double up on these two medications.    If you have not heard from Chikis (Dr. Boyd's nurse) by Friday, please call the clinic at [phone] to determine if another office visit is needed prior to your surgery.        Follow-ups after your visit        Your next 10 appointments already scheduled     Feb 14, 2017   Procedure with Aubrey Boyd MD   [facility] [place], Same Day Surgery (--)    [address] [facility] [address] [phone]            Feb 27, 2017 10:30 AM   Return Visit with Aubrey Boyd MD   [facility])    [address] [facility] [address] [phone]            Mar 27, 2017  4:00 PM   Return Visit with Aubrey Boyd MD   [facility] ([facility])    [address] [facility] [address] [phone]              Who to contact     If you have questions or need follow up information about today's clinic visit or your schedule please contact [facility] directly at [phone].  Normal or non-critical lab and imaging results will be communicated to you by MasCuponhart, letter or phone within 4 business days after the clinic has received the results. If you do not hear from us within 7 days, please contact the clinic through MasCuponhart or phone. If you have a critical or abnormal lab result, we will notify you by phone as soon as possible.  Submit refill requests through Peer39 or call your pharmacy and they will forward the refill request to us. Please allow 3 business days for your refill to be completed.          Additional Information About Your Visit        Mas[facility]uponhar[facility]ermira Information     Peer39 gives you secure access to your electronic health record. If you see a primary care provider, you can also send messages to your care team and make appointments. If you have questions, please call your primary care clinic.  If you do not have a primary care provider, please call [phone] and they will assist you.      Peer39 is an electronic gateway that provides easy, online access to your medical records. With Peer39, you can request a clinic appointment, read your test results, renew a prescription or communicate with your care team.     To access your existing account, please contact your [facility] or call [phone] for assistance.        Care EveryWhere ID     This is your Care EveryWhere ID. This could be used by other organizations to access your [facility] medical records  [id number]        Your Vitals Were     Pulse Temperature Respirations Pulse Oximetry Last Period       101 97.5  F (36.4  C) (Oral) 18 96% 12/06/2016 (Exact Date)        Blood Pressure from Last 3 Encounters:   01/18/17 126/83   01/09/17 133/83   01/09/17 127/93    Weight from Last 3 Encounters:   01/04/17 90.719 kg (200 lb)   12/31/16 98.7 kg (217 lb 9.5 oz)   06/02/16 96.48 kg (212 lb 11.2 oz)              We Performed the Following     UA reflex to  Microscopic and Culture          Today's Medication Changes          These changes are accurate as of: 1/18/17  1:51 PM.  If you have any questions, ask your nurse or doctor.               Start taking these medicines.        Dose/Directions    oxybutynin 5 MG tablet   Commonly known as:  DITROPAN   Used for:  Bladder spasms   Started by:  Rosita Quintanilla PA-C        Dose:  5 mg   Take 1 tablet (5 mg) by mouth 3 times daily   Quantity:  90 tablet   Refills:  1         Stop taking these medicines if you haven't already. Please contact your care team if you have questions.     tolterodine 2 MG 24 hr capsule   Commonly known as:  DETROL LA   Stopped by:  Rosita Quintanilla PA-C                Where to get your medicines      These medications were sent to [facility] [address] AT [address] [place] [address] & [facility]  [address] [place] [address]    Hours:  24-hours Phone:  [phone]    - oxybutynin 5 MG tablet             Primary Care Provider    [facility]       No address on file        Thank you!     Thank you for choosing [facility]  for your care. Our goal is always to provide you with excellent care. Hearing back from our patients is one way we can continue to improve our services. Please take a few minutes to complete the written survey that you may receive in the mail after your visit with us. Thank you!             Your Updated Medication List - Protect others around you: Learn how to safely use, store and throw away your medicines at www.disposemymeds.org.          This list is accurate as of: 1/18/17  1:51 PM.  Always use your most recent med list.                   Brand Name Dispense Instructions for use    albuterol 108 (90 BASE) MCG/ACT Inhaler   Generic drug:  albuterol      Inhale 1-2 puffs into the lungs every 4 hours as needed       budesonide-formoterol 160-4.5 MCG/ACT Inhaler    SYMBICORT     Inhale 1-2 puffs  into the lungs 2 times daily       cyclobenzaprine 5 MG tablet    FLEXERIL    120 tablet    Take 1-2 tablets (5-10 mg) by mouth every 8 hours as needed for muscle spasms       opium-belladonna 30-16.2 MG per suppository    B&O SUPPRETTES    80 suppository    Place 1 suppository rectally every 6 hours as needed for moderate pain       oxybutynin 5 MG tablet    DITROPAN    90 tablet    Take 1 tablet (5 mg) by mouth 3 times daily       oxyCODONE 5 MG IR tablet    ROXICODONE    60 tablet    Take 1-2 tablets (5-10 mg) by mouth every 3 hours as needed for moderate to severe pain       SINGULAIR PO      Take 10 mg by mouth At Bedtime

## 2017-01-18 NOTE — NURSING NOTE
"Crista Ma's goals for this visit include:   Chief Complaint   Patient presents with     Consult     hematuria       She requests these members of her care team be copied on today's visit information: No    PCP: [facility]    Referring Provider:  Aubrey Boyd MD  [facility] [place]  [address]  [place] [address]    Chief Complaint   Patient presents with     Consult     hematuria       Initial /83 mmHg  Pulse 101  Temp(Src) 97.5  F (36.4  C) (Oral)  Resp 18  SpO2 96%  LMP 12/06/2016 (Exact Date) Estimated body mass index is 30.42 kg/(m^2) as calculated from the following:    Height as of 1/9/17: 1.727 m (5' 8\").    Weight as of 1/4/17: 90.719 kg (200 lb).  BP completed using cuff size: large    Do you need any medication refills at today's visit? None    Florina Win  General Surgery - Urology RN      "

## 2017-01-18 NOTE — TELEPHONE ENCOUNTER
"Call Type: Triage Call    Presenting Problem: Patient calling reporting she had urinary cath  placed prior to scheduled surgery. Reporting new onset of blood in  urine this morning. \"Low abd pain.\" Afebrile.  Triage Note:  Guideline Title: Bloody Urine  Recommended Disposition: Call Provider Immediately  Original Inclination: Did not know what to do  Override Disposition:  Intended Action: Follow advice given  Physician Contacted: No  Any other unexpected urinary symptoms following urinary tract or abdominal surgery  within timeframe specified by provider ?  YES  New or worsening signs and symptoms that may indicate shock ? NO  Injury to flank area or abdomen AND visible mass/swelling; bruise-like  discoloration in flank area, or above pubic bone; or pure bloody urine ? NO  Unbearable flank, low back or lower abdominal pain ? NO  Any temperature elevation in an immunocompromised individual OR frail elderly with  signs of dehydration ? NO  Current or recent urinary tract instrumentation or surgery AND has a fever 101.5 F  (38.6 C) or higher, is unable to urinate OR is having more bleeding OR bleeding  is lasting longer than expected as defined by provider's follow-up precautions ?  NO  Physician Instructions:  Care Advice:  "

## 2017-01-18 NOTE — PATIENT INSTRUCTIONS
We will check your urine for infection. This result should be available within 1-2 days. I will call you if we need to give you an antibiotic.    A little bit of blood in the urine/around the catheter is not concerning. However, if it worsens significantly or if your catheter stops draining well, please give us a call.    Please start taking oxybutynin (Ditropan) 5 mg. You may take this 3 times daily as needed. Check your pill bottles when you get home - if you are taking tolterodine (Detrol), please STOP taking this and take the oxybutynin instead. We do not want to double up on these two medications.    If you have not heard from Chikis (Dr. Boyd's nurse) by Friday, please call the clinic at [phone] to determine if another office visit is needed prior to your surgery.

## 2017-01-20 DIAGNOSIS — N30.01 ACUTE CYSTITIS WITH HEMATURIA: Primary | ICD-10-CM

## 2017-01-20 LAB
BACTERIA SPEC CULT: ABNORMAL
MICRO REPORT STATUS: ABNORMAL
MICROORGANISM SPEC CULT: ABNORMAL
MICROORGANISM SPEC CULT: ABNORMAL
SPECIMEN SOURCE: ABNORMAL

## 2017-01-20 RX ORDER — CIPROFLOXACIN 500 MG/1
500 TABLET, FILM COATED ORAL 2 TIMES DAILY
Qty: 14 TABLET | Refills: 0 | Status: SHIPPED | OUTPATIENT
Start: 2017-01-20 | End: 2017-01-31

## 2017-01-26 ENCOUNTER — TELEPHONE (OUTPATIENT)
Dept: FAMILY MEDICINE | Facility: CLINIC | Age: 47
End: 2017-01-26

## 2017-01-26 NOTE — TELEPHONE ENCOUNTER
Reason for Call:  Other appointment    Detailed comments: Patient needs a Pre-Op she is having Surgery on 2/14  Hysterectomy Repair at [facility] and doesn't have a PCP the NP are same day  Only, can she be scheduled for this that's not a same day?  Please verify and reach out to the patient  Thank you      Phone Number Patient can be reached at: Cell number on file:    Telephone Information:   Mobile [phone]       Best Time: anytime    Can we leave a detailed message on this number? YES    Call taken on 1/26/2017 at 2:19 PM by Elio Leonardo

## 2017-01-31 ENCOUNTER — OFFICE VISIT (OUTPATIENT)
Dept: FAMILY MEDICINE | Facility: CLINIC | Age: 47
End: 2017-01-31
Payer: COMMERCIAL

## 2017-01-31 VITALS
HEART RATE: 72 BPM | BODY MASS INDEX: 30.62 KG/M2 | WEIGHT: 202 LBS | HEIGHT: 68 IN | TEMPERATURE: 97.5 F | OXYGEN SATURATION: 97 % | DIASTOLIC BLOOD PRESSURE: 76 MMHG | RESPIRATION RATE: 16 BRPM | SYSTOLIC BLOOD PRESSURE: 130 MMHG

## 2017-01-31 DIAGNOSIS — Z01.818 PREOP GENERAL PHYSICAL EXAM: Primary | ICD-10-CM

## 2017-01-31 DIAGNOSIS — J45.30 MILD PERSISTENT ASTHMA WITHOUT COMPLICATION: ICD-10-CM

## 2017-01-31 DIAGNOSIS — N82.0 VVF (VESICOVAGINAL FISTULA): ICD-10-CM

## 2017-01-31 LAB
ANION GAP SERPL CALCULATED.3IONS-SCNC: 8 MMOL/L (ref 3–14)
BUN SERPL-MCNC: 11 MG/DL (ref 7–30)
CALCIUM SERPL-MCNC: 9.1 MG/DL (ref 8.5–10.1)
CHLORIDE SERPL-SCNC: 106 MMOL/L (ref 94–109)
CO2 SERPL-SCNC: 27 MMOL/L (ref 20–32)
CREAT SERPL-MCNC: 0.75 MG/DL (ref 0.52–1.04)
ERYTHROCYTE [DISTWIDTH] IN BLOOD BY AUTOMATED COUNT: 13.2 % (ref 10–15)
GFR SERPL CREATININE-BSD FRML MDRD: 84 ML/MIN/1.7M2
GLUCOSE SERPL-MCNC: 98 MG/DL (ref 70–99)
HCT VFR BLD AUTO: 38.9 % (ref 35–47)
HGB BLD-MCNC: 12.7 G/DL (ref 11.7–15.7)
MCH RBC QN AUTO: 32.1 PG (ref 26.5–33)
MCHC RBC AUTO-ENTMCNC: 32.6 G/DL (ref 31.5–36.5)
MCV RBC AUTO: 98 FL (ref 78–100)
PLATELET # BLD AUTO: 301 10E9/L (ref 150–450)
POTASSIUM SERPL-SCNC: 4.1 MMOL/L (ref 3.4–5.3)
RBC # BLD AUTO: 3.96 10E12/L (ref 3.8–5.2)
SODIUM SERPL-SCNC: 141 MMOL/L (ref 133–144)
WBC # BLD AUTO: 5 10E9/L (ref 4–11)

## 2017-01-31 PROCEDURE — 36415 COLL VENOUS BLD VENIPUNCTURE: CPT | Performed by: PHYSICIAN ASSISTANT

## 2017-01-31 PROCEDURE — 99214 OFFICE O/P EST MOD 30 MIN: CPT | Performed by: PHYSICIAN ASSISTANT

## 2017-01-31 PROCEDURE — 80048 BASIC METABOLIC PNL TOTAL CA: CPT | Performed by: PHYSICIAN ASSISTANT

## 2017-01-31 PROCEDURE — 85027 COMPLETE CBC AUTOMATED: CPT | Performed by: PHYSICIAN ASSISTANT

## 2017-01-31 RX ORDER — OXYBUTYNIN CHLORIDE 5 MG/1
5 TABLET ORAL DAILY
Refills: 98 | COMMUNITY
Start: 2017-01-18 | End: 2017-01-31

## 2017-01-31 ASSESSMENT — PAIN SCALES - GENERAL: PAINLEVEL: MILD PAIN (3)

## 2017-01-31 NOTE — NURSING NOTE
"Chief Complaint   Patient presents with     Pre-Op Exam       Initial /76 mmHg  Pulse 72  Temp(Src) 97.5  F (36.4  C) (Oral)  Resp 16  Ht 5' 7.5\" (1.715 m)  Wt 202 lb (91.627 kg)  BMI 31.15 kg/m2  SpO2 97%  LMP 12/06/2016 (Exact Date)  Breastfeeding? No Estimated body mass index is 31.15 kg/(m^2) as calculated from the following:    Height as of this encounter: 5' 7.5\" (1.715 m).    Weight as of this encounter: 202 lb (91.627 kg).  BP completed using cuff size: aristides Davis MA       "

## 2017-01-31 NOTE — PATIENT INSTRUCTIONS
How to contact your care team: [phone] Pharmacy [phone]   MONICA CLAIRE MD KATYA GEORGIEV, PA-C CHRIS JONES, PA-C NAM HO, MD JONATHAN BATES, MD ARVIN VOCAL, MD    Clinic hours M-Th 7am-7pm Fri 7am-5pm.   Urgent care M-F 11am-9pm  Sat/Sun 9am-5pm.   Pharmacy   Mon-Th:  8:00am-8pm   Fri:  8:00am-6:00pm  Sat/Sun  8:00am-5:00 pm       Before Your Surgery      Call your surgeon if there is any change in your health. This includes signs of a cold or flu (such as a sore throat, runny nose, cough, rash or fever).    Do not smoke, drink alcohol or take over the counter medicine (unless your surgeon or primary care doctor tells you to) for the 24 hours before and after surgery.    If you take prescribed drugs: Follow your doctor s orders about which medicines to take and which to stop until after surgery.    Eating and drinking prior to surgery: follow the instructions from your surgeon    Take a shower or bath the night before surgery. Use the soap your surgeon gave you to gently clean your skin. If you do not have soap from your surgeon, use your regular soap. Do not shave or scrub the surgery site.  Wear clean pajamas and have clean sheets on your bed.

## 2017-01-31 NOTE — MR AVS SNAPSHOT
After Visit Summary   1/31/2017    Crista Ma    MRN: [id number]           Patient Information     Date Of Birth          1970        Visit Information        Provider Department      1/31/2017 7:00 AM Rosita Oconnell PA-C [facility]        Today's Diagnoses     Preop general physical exam    -  1     VVF (vesicovaginal fistula)         Mild persistent asthma without complication           Care Instructions    How to contact your care team: [phone] Pharmacy [phone]   MONICA CLAIRE MD KATYA GEORGIEV, PA-C CHRIS JONES, PA-C NAM HO, MD JONATHAN BATES, MD ARVIN VOCAL, MD    Clinic hours M-Th 7am-7pm Fri 7am-5pm.   Urgent care M-F 11am-9pm  Sat/Sun 9am-5pm.   Pharmacy   Mon-Th:  8:00am-8pm   Fri:  8:00am-6:00pm  Sat/Sun  8:00am-5:00 pm       Before Your Surgery      Call your surgeon if there is any change in your health. This includes signs of a cold or flu (such as a sore throat, runny nose, cough, rash or fever).    Do not smoke, drink alcohol or take over the counter medicine (unless your surgeon or primary care doctor tells you to) for the 24 hours before and after surgery.    If you take prescribed drugs: Follow your doctor s orders about which medicines to take and which to stop until after surgery.    Eating and drinking prior to surgery: follow the instructions from your surgeon    Take a shower or bath the night before surgery. Use the soap your surgeon gave you to gently clean your skin. If you do not have soap from your surgeon, use your regular soap. Do not shave or scrub the surgery site.  Wear clean pajamas and have clean sheets on your bed.         Follow-ups after your visit        Your next 10 appointments already scheduled     Feb 13, 2017  1:45 PM   Return Visit with Aubrey Boyd MD   [facility]    [address] [place] [address]    [phone]            Feb 14, 2017   Procedure with Aubrey Boyd MD   [facility], [place], Same Day Surgery (--)    [address]  [place] [address] [phone]            Feb 27, 2017 10:30 AM   [facility] Visit with Aub[facility] Boyd MD   [facility]    [address] [place] [address] [phone]            Mar 27, 2017  4:00 PM   Return Visit with Aubrey Boyd MD   [facility]    [address] [place] [address] [phone]              Who to contact     If you have questions or need follow up information about today's clinic visit or your schedule please contact [facility] directly at [phone].  Normal or non-critical lab and imaging results will be communicated to you by [employer], letter or phone within 4 business days after the clinic has received the results. If you do not hear from us within 7 days, please contact the clinic through [employer] or phone. If you have a critical or abnormal lab result, we will notify you by phone as soon as possible.  Submit refill requests through [employer] or call your pharmacy and they will forward the refill request to us. Please allow 3 business days for your refill to be completed.          Additional Information About Your Visit        Mobily[employer]hart Information     [employer] gives you secure access to your electronic health record. If you see a primary care provider, you can also send messages to your care team and make appointments. If you have questions, please call your primary care clinic.  If you do not have a primary care provider, please call [phone] and they will assist you.        Care EveryWhere ID     This is your Care EveryWhere ID. This could be used by other organizations to access your [place] medical records  [id number]        Your Vitals Were     Pulse Temperature Respirations    72 97.5  F (36.4  " C) (Oral) 16    Height BMI (Body Mass Index) Pulse Oximetry    5' 7.5\" (1.715 m) 31.15 kg/m2 97%    Last Period Breastfeeding?       12/06/2016 (Exact Date) No        Blood Pressure from Last 3 Encounters:   01/31/17 130/76   01/18/17 126/83   01/09/17 133/83    Weight from Last 3 Encounters:   01/31/17 202 lb (91.627 kg)   01/04/17 200 lb (90.719 kg)   12/31/16 217 lb 9.5 oz (98.7 kg)              We Performed the Following     Asthma Action Plan (AAP)     Basic metabolic panel     CBC with platelets        Primary Care Provider    [facility]       No address on file        Thank you!     Thank you for choosing [facility]  for your care. Our goal is always to provide you with excellent care. Hearing back from our patients is one way we can continue to improve our services. Please take a few minutes to complete the written survey that you may receive in the mail after your visit with us. Thank you!             Your Updated Medication List - Protect others around you: Learn how to safely use, store and throw away your medicines at www.disposemymeds.org.          This list is accurate as of: 1/31/17  7:28 AM.  Always use your most recent med list.                   Brand Name Dispense Instructions for use    albuterol 108 (90 BASE) MCG/ACT Inhaler   Generic drug:  albuterol      Inhale 1-2 puffs into the lungs every 4 hours as needed       budesonide-formoterol 160-4.5 MCG/ACT Inhaler    SYMBICORT     Inhale 1-2 puffs into the lungs 2 times daily       oxyCODONE 5 MG IR tablet    ROXICODONE    60 tablet    Take 1-2 tablets (5-10 mg) by mouth every 3 hours as needed for moderate to severe pain       SINGULAIR PO      Take 10 mg by mouth At Bedtime       sulfamethoxazole-trimethoprim 400-80 MG per tablet    BACTRIM/SEPTRA    90 tablet    Take 1 tablet by mouth At Bedtime       tolterodine 4 MG 24 hr capsule    DETROL LA    90 capsule    Take 1 capsule (4 mg) by mouth daily "

## 2017-01-31 NOTE — PROGRESS NOTES
[address] [address]  [phone]  Dept: [phone]    PRE-OP EVALUATION:  Today's date: 2017    Crista Ma (: 1970) presents for pre-operative evaluation assessment as requested by Dr. Boyd.  She requires evaluation and anesthesia risk assessment prior to undergoing surgery/procedure for treatment of bladder .  Proposed procedure: Davinci Assisted Vesicovaginal Fistula Repair, Cystoscopy     Date of Surgery/ Procedure: 17  Time of Surgery/ Procedure: 6:00am  Hospital/Surgical Facility: [facility]  Fax number for surgical facility: available electronically  Primary Physician: [facility]  Type of Anesthesia Anticipated: General    Patient has a Health Care Directive or Living Will:  NO    1. NO - Do you have a history of heart attack, stroke, stent, bypass or surgery on an artery in the head, neck, heart or legs?  2. NO - Do you ever have any pain or discomfort in your chest?  3. NO - Do you have a history of  Heart Failure?  4. NO - Are you troubled by shortness of breath when: walking on the level, up a slight hill or at night?  5. NO - Do you currently have a cold, bronchitis or other respiratory infection?  6. NO - Do you have a cough, shortness of breath or wheezing?  7. NO - Do you sometimes get pains in the calves of your legs when you walk?  8. NO - Do you or anyone in your family have previous history of blood clots?  9. NO - Do you or does anyone in your family have a serious bleeding problem such as prolonged bleeding following surgeries or cuts?  10. NO - Have you ever had problems with anemia or been told to take iron pills?  11. NO - Have you had any abnormal blood loss such as black, tarry or bloody stools, or abnormal vaginal bleeding?  12. NO - Have you ever had a blood transfusion?  13. NO - Have you or any of your relatives ever had problems with anesthesia?  14. NO - Do  you have sleep apnea, excessive snoring or daytime drowsiness?  15. NO - Do you have any prosthetic heart valves?  16. NO - Do you have prosthetic joints?  17. NO - Is there any chance that you may be pregnant?      HPI:                                                      Crista is a 46 year old female that presents today for preoperative history and physical for upcoming bladder and vaginal wall repair.  She recently underwent a routine hysterectomy for menorrhagia when a complication occurred involving these structures.  Past medical history is significant for mild persistent asthma, controlled.  Denies a family or personal history of blood clots or bleeding disorders, early CAD or stroke, or difficulties with anesthesia.  Denies recent illnesses, fever, chills, cough or cold symptoms.  No chest pain, sob, orthopnea, exertional symptoms.    MEDICAL HISTORY:                                                      Patient Active Problem List    Diagnosis Date Noted     VVF (vesicovaginal fistula) 01/09/2017     Priority: Medium     Post-op pain 12/27/2016     Priority: Medium     Menorrhagia 12/20/2016     Priority: Medium     Dysmenorrhea 12/20/2016     Priority: Medium     HARRY (stress urinary incontinence, female) 12/20/2016     Priority: Medium     Mild persistent asthma without complication 05/14/2015     Priority: Medium      Past Medical History   Diagnosis Date     Unspecified asthma(493.90)      on singulair, advair     Allergic rhinitis, cause unspecified      Pneumothorax      spontaneous x 4-as a child r/t pneumonia     Seasonal affective disorder (H)      prozac      Dysmenorrhea      Past Surgical History   Procedure Laterality Date     Hc enlarge breast with implant       [place] teeth       Laparoscopic assisted hysterectomy vaginal N/A 12/27/2016     Procedure: LAPAROSCOPIC ASSISTED HYSTERECTOMY VAGINAL;  Surgeon: Abebe Michelle MD;  Location: [facility] OR     Laparoscopic salpingectomy  Bilateral 12/27/2016     Procedure: LAPAROSCOPIC SALPINGECTOMY;  Surgeon: Abebe Michelle MD;  Location: [facility] OR     Cystoscopy N/A 12/27/2016     Procedure: CYSTOSCOPY;  Surgeon: Abebe Michelle MD;  Location: [facility] OR     Hysterectomy total abdominal, salpingectomy N/A 12/27/2016     Procedure: HYSTERECTOMY TOTAL ABDOMINAL, SALPINGECTOMY;  Surgeon: Abebe Michelle MD;  Location: [facility] OR     Repair bladder N/A 12/27/2016     Procedure: REPAIR BLADDER;  Surgeon: Abebe Michelle MD;  Location: [facility] OR     Current Outpatient Prescriptions   Medication Sig Dispense Refill     tolterodine (DETROL LA) 4 MG 24 hr capsule Take 1 capsule (4 mg) by mouth daily 90 capsule 1     sulfamethoxazole-trimethoprim (BACTRIM/SEPTRA) 400-80 MG per tablet Take 1 tablet by mouth At Bedtime 90 tablet 0     oxyCODONE (ROXICODONE) 5 MG IR tablet Take 1-2 tablets (5-10 mg) by mouth every 3 hours as needed for moderate to severe pain 60 tablet 0     Montelukast Sodium (SINGULAIR PO) Take 10 mg by mouth At Bedtime       budesonide-formoterol (SYMBICORT) 160-4.5 MCG/ACT Inhaler Inhale 1-2 puffs into the lungs 2 times daily       ALBUTEROL 108 (90 BASE) MCG/ACT inhaler Inhale 1-2 puffs into the lungs every 4 hours as needed   3     OTC products: None, except as noted above    Allergies   Allergen Reactions     No Known Drug Allergies       Latex Allergy: NO    Social History   Substance Use Topics     Smoking status: Never Smoker      Smokeless tobacco: Not on file      Comment: 1/2 pk/wk-social     Alcohol Use: 6.0 oz/week     10 Standard drinks or equivalent per week      Comment: 3 drinks/wk     History   Drug Use No       REVIEW OF SYSTEMS:                                                    Constitutional, neuro, ENT, endocrine, pulmonary, cardiac, gastrointestinal, genitourinary, musculoskeletal, integument and psychiatric systems are negative, except as otherwise  "noted.    EXAM:                                                    /76 mmHg  Pulse 72  Temp(Src) 97.5  F (36.4  C) (Oral)  Resp 16  Ht 5' 7.5\" (1.715 m)  Wt 202 lb (91.627 kg)  BMI 31.15 kg/m2  SpO2 97%  LMP 12/06/2016 (Exact Date)  Breastfeeding? No        GENERAL APPEARANCE: healthy, alert and no distress     EYES: EOMI,- PERRL     HENT: ear canals and TM's normal and nose and mouth without ulcers or lesions     NECK: no adenopathy, no asymmetry, masses, or scars and thyroid normal to palpation     RESP: lungs clear to auscultation - no rales, rhonchi or wheezes     CV: regular rates and rhythm, normal S1 S2, no S3 or S4 and no murmur, click or rub -     ABDOMEN:  soft, nontender, no HSM or masses and bowel sounds normal     MS: extremities normal- no gross deformities noted, no evidence of inflammation in joints, FROM in all extremities.     SKIN: no suspicious lesions or rashes     NEURO: Normal strength and tone, sensory exam grossly normal, mentation intact and speech normal     PSYCH: mentation appears normal. and affect normal/bright     LYMPHATICS: No axillary, cervical, inguinal, or supraclavicular nodes    DIAGNOSTICS:                                                    EKG: Not indicated due to non-vascular surgery and low risk of event (age <65 and without cardiac risk factors)    Recent Labs   Lab Test  12/30/16   0740  12/28/16   1030  05/14/15   1844  07/06/14   1335   HGB   --   10.9*  14.7  14.4   PLT   --   185  214  201   NA   --   140   --   140   POTASSIUM   --   4.5   --   3.8   CR  0.72  0.72   --   0.67        IMPRESSION:                                                    Reason for surgery/procedure: vesicovaginal fistula  Diagnosis/reason for consult: preoperative risk assessment    The proposed surgical procedure is considered INTERMEDIATE risk.    REVISED CARDIAC RISK INDEX  The patient has the following serious cardiovascular risks for perioperative complications such as " (MI, PE, VFib and 3  AV Block):  No serious cardiac risks  INTERPRETATION: 0 risks: Class I (very low risk - 0.4% complication rate)    The patient has the following additional risks for perioperative complications:  No identified additional risks    ASSESSMENT / PLAN:  (Z01.818) Preop general physical exam  (primary encounter diagnosis)  Plan: CBC with platelets, Basic metabolic panel        Cleared for surgery.  Labs are pending    (N82.0) VVF (vesicovaginal fistula)  Plan: As above    (J45.30) Mild persistent asthma without complication  Comment: stable  Plan: Continue meds without change       RECOMMENDATIONS:                                                      --Patient is to take all scheduled medications on the day of surgery EXCEPT for modifications listed below.  --NPO after midnight night prior to procedure   --No NSAIDs, fish oil, coQ10, vitamin E or herbals for 5-7 days prior to procedure    APPROVAL GIVEN to proceed with proposed procedure, without further diagnostic evaluation       Signed Electronically by: Rosita Oconnell PA-C  Reviewed and agree with plan.  Ashley Ardon M.D.     Copy of this evaluation report is provided to requesting physician.    [facility] Preop Guidelines

## 2017-01-31 NOTE — PROGRESS NOTES
[facility] , they can view our EPIC for all pre-op information.  Jerry Stokes,  For [employer] [facility]

## 2017-02-01 ASSESSMENT — ASTHMA QUESTIONNAIRES: ACT_TOTALSCORE: 25

## 2017-02-07 ENCOUNTER — MYC MEDICAL ADVICE (OUTPATIENT)
Dept: UROLOGY | Facility: CLINIC | Age: 47
End: 2017-02-07

## 2017-02-08 NOTE — TELEPHONE ENCOUNTER
Discussed with Rosita Wilkinson, MONICA and possible infection should not interfere with surgery on 2/14/17. Per Rosita, this was discussed with Dr. Boyd and plan was for prophylactic bactrim, anticholinergic medication, and neri to remain in place. Per Rosita, the UA/UC is up to the patient, but if completed results would be available for Dr. Boyd to review on Monday.     Left message for patient requesting a call back to clinic. Will discuss the above information and offer to send prescription for oxybutynin IR TID per Rosita Wilkinson's recommendations.    Florina Win  General Surgery - Urology RN

## 2017-02-08 NOTE — TELEPHONE ENCOUNTER
"Received call back from patient who is aware of information below. Patient stated, \"If they are aware and don't feel like it will interfere with surgery then I won't do the extra trip for the urine sample. I will just keep on my detrol and bactrim and it is probably from bladder spasms.\" Informed patient that this can be discussed further with Dr. Boyd on Monday and that the nurse appointment for tomorrow will be cancelled. Informed patient to call with any questions or concerns in the meantime. Patient verbalized understanding and was comfortable with plan.    Florina Win  General Surgery - Urology RN    "

## 2017-02-08 NOTE — TELEPHONE ENCOUNTER
"Called and spoke to patient who is unable to come to clinic today for UA/UC sample from neri. Nurse only appointment scheduled for tomorrow at 11:30am. Patient stated, \"I am feeling okay especially with some ibuprofen and the bladder spasms aren't really bothering me. My biggest thing is that I don't want this to interfere with surgery on 2/14 because I really need that. They had kind of said that I would always see an infection and I have been taking that bactrim every night.\" Informed patient that writer will discuss with Rosita Wilkinson PA-C and return call to her with additional information.    Florina Win  General Surgery - Urology RN    "

## 2017-02-08 NOTE — TELEPHONE ENCOUNTER
Will route message to Rosita Wilkinson PA-C to review and advise.    Florina Win  General Surgery - Urology RN

## 2017-02-08 NOTE — TELEPHONE ENCOUNTER
Possible she may be having increased bladder spasms causing the pain as opposed to flare of her infection since she is on prophylaxis with Bactrim. Regardless, would be reasonable to collect a UA/UC to check for continued infection. She will have some colonization of bacteria from the indwelling Garcia which will complicate things slightly, but a culture would at least help to ensure she is on the correct prophylactic antibiotic prior to her upcoming surgery on 2/14/17. If UA/UC looks fine, then could consider switching her from once daily Detrol to TID oxybutynin IR to see if this provides more relief from bladder spasms.  Rosita Wilkinson PA-C  Urology

## 2017-02-13 ENCOUNTER — ANESTHESIA EVENT (OUTPATIENT)
Dept: SURGERY | Facility: CLINIC | Age: 47
End: 2017-02-13
Payer: COMMERCIAL

## 2017-02-13 ENCOUNTER — OFFICE VISIT (OUTPATIENT)
Dept: UROLOGY | Facility: CLINIC | Age: 47
End: 2017-02-13
Payer: COMMERCIAL

## 2017-02-13 VITALS — HEART RATE: 88 BPM | DIASTOLIC BLOOD PRESSURE: 83 MMHG | SYSTOLIC BLOOD PRESSURE: 129 MMHG

## 2017-02-13 DIAGNOSIS — N82.0 VVF (VESICOVAGINAL FISTULA): Primary | ICD-10-CM

## 2017-02-13 DIAGNOSIS — N39.3 SUI (STRESS URINARY INCONTINENCE, FEMALE): ICD-10-CM

## 2017-02-13 PROCEDURE — 99214 OFFICE O/P EST MOD 30 MIN: CPT | Mod: 57 | Performed by: UROLOGY

## 2017-02-13 ASSESSMENT — PAIN SCALES - GENERAL: PAINLEVEL: NO PAIN (1)

## 2017-02-13 NOTE — PROGRESS NOTES
Reason for Visit:  Discuss placing a midurethral sling at time of VVF repair.    Clinical Data:  Ms. Ma is a 47 y/o female with hx of VVF during a hx and cystotomy.  She also has stress incontinence and wishes to have a midurethral sling placed at the same time.    A/P:  47 y/o female with stress urinary incontinence as well as VVF.  She is very bothered by her stress incontinence and wishes to have a sling at the same time.  We discussed the use of mesh tape in surgery and the risks of infection, bleeding, exposure of mesh, vaginal pain, injury to the bladder/urethra/rectum, as well as risk of continued incontinence or urinary retention.  We discussed the FDA public health notification at length.  The pt. Verbalized understanding and had a chance to ask her questions which were all answered.    -add midurethral sling to procedure tomorrow.  Thank you for allowing me to participate in the care of  Ms. Crista Ma and I will keep you updated on her progress.    Aubrey Boyd MD  25 min spent with patient,  >50% in discussion and coordination of care.

## 2017-02-13 NOTE — MR AVS SNAPSHOT
After Visit Summary   2/13/2017    Crista Ma    MRN: [id number]           Patient Information     Date Of Birth          1970        Visit Information        Provider Department      2/13/2017 1:45 PM Aubrey Boyd MD [facility]        Today's Diagnoses     VVF (vesicovaginal fistula)    -  1    HARRY (stress urinary incontinence, female)           Follow-ups after your visit        Follow-up notes from your care team     Return in about 2 weeks (around 2/27/2017).      Your next 10 appointments already scheduled     Feb 14, 2017   Procedure with Aubrey Boyd MD   [facility], [place], Same Day Surgery (--)    [address] [facility] [place] [address]   [phone]            Feb 27, 2017 10:30 AM CST   Return Visit with Aubrey Boyd MD   [facility]    [address] [place] [id number] [phone]            Mar 27, 2017  4:00 PM CDT   Return Visit with Aubrey Boyd MD   [facility] ([facility])    [address] [place] [address]   [phone]              Who to contact     If you have questions or need follow up information about today's clinic visit or your schedule please contact [facility] directly at [phone].  Normal or non-critical lab and imaging results will be communicated to you by MyChart, letter or phone within 4 business days after the clinic has received the results. If you do not hear from us within 7 days, please contact the clinic through [employer] or phone. If you have a critical or abnormal lab result, we will notify you by phone as soon as possible.  Submit refill requests through [employer] or call your pharmacy and they will forward the refill request to us. Please allow 3 business days for your refill to be completed.          Additional Information About Your Visit        MyChart Information     [facility] gives you  secure access to your electronic health record. If you see a primary care provider, you can also send messages to your care team and make appointments. If you have questions, please call your primary care clinic.  If you do not have a primary care provider, please call [phone] and they will assist you.      [employer] is an electronic gateway that provides easy, online access to your medical records. With [employer], you can request a clinic appointment, read your test results, renew a prescription or communicate with your care team.     To access your existing account, please contact your [facility] or call [phone] for assistance.        Care EveryWhere ID     This is your Care EveryWhere ID. This could be used by other organizations to access your [place] medical records  [id number]        Your Vitals Were     Pulse Last Period                88 12/06/2016 (Exact Date)           Blood Pressure from Last 3 Encounters:   02/13/17 129/83   01/31/17 130/76   01/18/17 126/83    Weight from Last 3 Encounters:   01/31/17 91.6 kg (202 lb)   01/04/17 90.7 kg (200 lb)   12/31/16 98.7 kg (217 lb 9.5 oz)              Today, you had the following     No orders found for display       Primary Care Provider    [facility]       No address on file        Thank you!     Thank you for choosing [facility]  for your care. Our goal is always to provide you with excellent care. Hearing back from our patients is one way we can continue to improve our services. Please take a few minutes to complete the written survey that you may receive in the mail after your visit with us. Thank you!             Your Updated Medication List - Protect others around you: Learn how to safely use, store and throw away your medicines at www.disposemymeds.org.          This list is accurate as of: 2/13/17  2:29 PM.  Always use your most recent med list.                   Brand  Name Dispense Instructions for use    albuterol 108 (90 BASE) MCG/ACT Inhaler   Generic drug:  albuterol      Inhale 1-2 puffs into the lungs every 4 hours as needed       budesonide-formoterol 160-4.5 MCG/ACT Inhaler    SYMBICORT     Inhale 1-2 puffs into the lungs 2 times daily       oxyCODONE 5 MG IR tablet    ROXICODONE    60 tablet    Take 1-2 tablets (5-10 mg) by mouth every 3 hours as needed for moderate to severe pain       SINGULAIR PO      Take 10 mg by mouth At Bedtime       tolterodine 4 MG 24 hr capsule    DETROL LA    90 capsule    Take 1 capsule (4 mg) by mouth daily

## 2017-02-14 ENCOUNTER — HOSPITAL ENCOUNTER (OUTPATIENT)
Facility: CLINIC | Age: 47
Discharge: HOME | End: 2017-02-14
Attending: UROLOGY | Admitting: UROLOGY
Payer: COMMERCIAL

## 2017-02-14 ENCOUNTER — APPOINTMENT (OUTPATIENT)
Dept: GENERAL RADIOLOGY | Facility: CLINIC | Age: 47
End: 2017-02-14
Attending: UROLOGY
Payer: COMMERCIAL

## 2017-02-14 ENCOUNTER — ANESTHESIA (OUTPATIENT)
Dept: SURGERY | Facility: CLINIC | Age: 47
End: 2017-02-14
Payer: COMMERCIAL

## 2017-02-14 VITALS
RESPIRATION RATE: 16 BRPM | TEMPERATURE: 98 F | HEIGHT: 67 IN | SYSTOLIC BLOOD PRESSURE: 125 MMHG | WEIGHT: 204.59 LBS | OXYGEN SATURATION: 91 % | HEART RATE: 88 BPM | DIASTOLIC BLOOD PRESSURE: 84 MMHG | BODY MASS INDEX: 32.11 KG/M2

## 2017-02-14 DIAGNOSIS — N82.0 VVF (VESICOVAGINAL FISTULA): Primary | ICD-10-CM

## 2017-02-14 DIAGNOSIS — N32.89 BLADDER SPASMS: ICD-10-CM

## 2017-02-14 DIAGNOSIS — G89.18 POST-OP PAIN: ICD-10-CM

## 2017-02-14 PROCEDURE — 71000027 ZZH RECOVERY PHASE 2 EACH 15 MINS: Performed by: UROLOGY

## 2017-02-14 PROCEDURE — 36000088 ZZH SURGERY LEVEL 8 EA 15 ADDTL MIN - UMMC: Performed by: UROLOGY

## 2017-02-14 PROCEDURE — 25000128 H RX IP 250 OP 636: Performed by: UROLOGY

## 2017-02-14 PROCEDURE — 27810325 ZZHC OR IMPLANT OTHER OPNP: Performed by: UROLOGY

## 2017-02-14 PROCEDURE — 27210794 ZZH OR GENERAL SUPPLY STERILE: Performed by: UROLOGY

## 2017-02-14 PROCEDURE — 36000086 ZZH SURGERY LEVEL 8 1ST 30 MIN UMMC: Performed by: UROLOGY

## 2017-02-14 PROCEDURE — 40000170 ZZH STATISTIC PRE-PROCEDURE ASSESSMENT II: Performed by: UROLOGY

## 2017-02-14 PROCEDURE — 25000128 H RX IP 250 OP 636: Performed by: ANESTHESIOLOGY

## 2017-02-14 PROCEDURE — 25000125 ZZHC RX 250: Performed by: NURSE ANESTHETIST, CERTIFIED REGISTERED

## 2017-02-14 PROCEDURE — 25000128 H RX IP 250 OP 636: Performed by: NURSE ANESTHETIST, CERTIFIED REGISTERED

## 2017-02-14 PROCEDURE — 37000008 ZZH ANESTHESIA TECHNICAL FEE, 1ST 30 MIN: Performed by: UROLOGY

## 2017-02-14 PROCEDURE — 25000125 ZZHC RX 250: Performed by: ANESTHESIOLOGY

## 2017-02-14 PROCEDURE — 25800025 ZZH RX 258: Performed by: ANESTHESIOLOGY

## 2017-02-14 PROCEDURE — 40000277 XR SURGERY CARM FLUORO LESS THAN 5 MIN W STILLS: Mod: TC

## 2017-02-14 PROCEDURE — 25500064 ZZH RX 255 OP 636: Performed by: UROLOGY

## 2017-02-14 PROCEDURE — 25000566 ZZH SEVOFLURANE, EA 15 MIN: Performed by: UROLOGY

## 2017-02-14 PROCEDURE — 25000132 ZZH RX MED GY IP 250 OP 250 PS 637: Performed by: STUDENT IN AN ORGANIZED HEALTH CARE EDUCATION/TRAINING PROGRAM

## 2017-02-14 PROCEDURE — C9290 INJ, BUPIVACAINE LIPOSOME: HCPCS | Performed by: ANESTHESIOLOGY

## 2017-02-14 PROCEDURE — C1771 REP DEV, URINARY, W/SLING: HCPCS | Performed by: UROLOGY

## 2017-02-14 PROCEDURE — C1769 GUIDE WIRE: HCPCS | Performed by: UROLOGY

## 2017-02-14 PROCEDURE — 37000009 ZZH ANESTHESIA TECHNICAL FEE, EACH ADDTL 15 MIN: Performed by: UROLOGY

## 2017-02-14 PROCEDURE — 71000015 ZZH RECOVERY PHASE 1 LEVEL 2 EA ADDTL HR: Performed by: UROLOGY

## 2017-02-14 PROCEDURE — 71000014 ZZH RECOVERY PHASE 1 LEVEL 2 FIRST HR: Performed by: UROLOGY

## 2017-02-14 PROCEDURE — C9399 UNCLASSIFIED DRUGS OR BIOLOG: HCPCS | Performed by: NURSE ANESTHETIST, CERTIFIED REGISTERED

## 2017-02-14 DEVICE — IMPLANTABLE DEVICE: Type: IMPLANTABLE DEVICE | Site: PELVIS | Status: FUNCTIONAL

## 2017-02-14 DEVICE — IMPLANTABLE DEVICE: Type: IMPLANTABLE DEVICE | Site: VAGINA | Status: FUNCTIONAL

## 2017-02-14 RX ORDER — DEXAMETHASONE SODIUM PHOSPHATE 4 MG/ML
INJECTION, SOLUTION INTRA-ARTICULAR; INTRALESIONAL; INTRAMUSCULAR; INTRAVENOUS; SOFT TISSUE PRN
Status: DISCONTINUED | OUTPATIENT
Start: 2017-02-14 | End: 2017-02-14

## 2017-02-14 RX ORDER — CEFAZOLIN SODIUM 2 G/100ML
2 INJECTION, SOLUTION INTRAVENOUS
Status: COMPLETED | OUTPATIENT
Start: 2017-02-14 | End: 2017-02-14

## 2017-02-14 RX ORDER — SODIUM CHLORIDE, SODIUM LACTATE, POTASSIUM CHLORIDE, CALCIUM CHLORIDE 600; 310; 30; 20 MG/100ML; MG/100ML; MG/100ML; MG/100ML
INJECTION, SOLUTION INTRAVENOUS CONTINUOUS
Status: DISCONTINUED | OUTPATIENT
Start: 2017-02-14 | End: 2017-02-14 | Stop reason: HOSPADM

## 2017-02-14 RX ORDER — LIDOCAINE 40 MG/G
CREAM TOPICAL
Status: DISCONTINUED | OUTPATIENT
Start: 2017-02-14 | End: 2017-02-14 | Stop reason: HOSPADM

## 2017-02-14 RX ORDER — LIDOCAINE HYDROCHLORIDE 20 MG/ML
INJECTION, SOLUTION INFILTRATION; PERINEURAL PRN
Status: DISCONTINUED | OUTPATIENT
Start: 2017-02-14 | End: 2017-02-14

## 2017-02-14 RX ORDER — TOLTERODINE 4 MG/1
4 CAPSULE, EXTENDED RELEASE ORAL DAILY
Qty: 90 CAPSULE | Refills: 1 | Status: SHIPPED | OUTPATIENT
Start: 2017-02-14 | End: 2017-05-01

## 2017-02-14 RX ORDER — FLUMAZENIL 0.1 MG/ML
0.2 INJECTION, SOLUTION INTRAVENOUS
Status: DISCONTINUED | OUTPATIENT
Start: 2017-02-14 | End: 2017-02-14 | Stop reason: HOSPADM

## 2017-02-14 RX ORDER — PROPOFOL 10 MG/ML
INJECTION, EMULSION INTRAVENOUS PRN
Status: DISCONTINUED | OUTPATIENT
Start: 2017-02-14 | End: 2017-02-14

## 2017-02-14 RX ORDER — FENTANYL CITRATE 50 UG/ML
25-50 INJECTION, SOLUTION INTRAMUSCULAR; INTRAVENOUS
Status: DISCONTINUED | OUTPATIENT
Start: 2017-02-14 | End: 2017-02-14 | Stop reason: HOSPADM

## 2017-02-14 RX ORDER — ONDANSETRON 2 MG/ML
4 INJECTION INTRAMUSCULAR; INTRAVENOUS EVERY 30 MIN PRN
Status: DISCONTINUED | OUTPATIENT
Start: 2017-02-14 | End: 2017-02-14 | Stop reason: HOSPADM

## 2017-02-14 RX ORDER — FENTANYL CITRATE 50 UG/ML
50 INJECTION, SOLUTION INTRAMUSCULAR; INTRAVENOUS EVERY 10 MIN PRN
Status: DISCONTINUED | OUTPATIENT
Start: 2017-02-14 | End: 2017-02-14 | Stop reason: HOSPADM

## 2017-02-14 RX ORDER — MEPERIDINE HYDROCHLORIDE 25 MG/ML
12.5 INJECTION INTRAMUSCULAR; INTRAVENOUS; SUBCUTANEOUS
Status: DISCONTINUED | OUTPATIENT
Start: 2017-02-14 | End: 2017-02-14 | Stop reason: HOSPADM

## 2017-02-14 RX ORDER — OXYCODONE HYDROCHLORIDE 5 MG/1
5-10 TABLET ORAL
Qty: 30 TABLET | Refills: 0 | Status: SHIPPED | OUTPATIENT
Start: 2017-02-14 | End: 2017-05-01

## 2017-02-14 RX ORDER — FENTANYL CITRATE 50 UG/ML
INJECTION, SOLUTION INTRAMUSCULAR; INTRAVENOUS PRN
Status: DISCONTINUED | OUTPATIENT
Start: 2017-02-14 | End: 2017-02-14

## 2017-02-14 RX ORDER — ONDANSETRON 2 MG/ML
INJECTION INTRAMUSCULAR; INTRAVENOUS PRN
Status: DISCONTINUED | OUTPATIENT
Start: 2017-02-14 | End: 2017-02-14

## 2017-02-14 RX ORDER — BUPIVACAINE HYDROCHLORIDE AND EPINEPHRINE 2.5; 5 MG/ML; UG/ML
INJECTION, SOLUTION INFILTRATION; PERINEURAL PRN
Status: DISCONTINUED | OUTPATIENT
Start: 2017-02-14 | End: 2017-02-14

## 2017-02-14 RX ORDER — ONDANSETRON 4 MG/1
4 TABLET, ORALLY DISINTEGRATING ORAL EVERY 30 MIN PRN
Status: DISCONTINUED | OUTPATIENT
Start: 2017-02-14 | End: 2017-02-14 | Stop reason: HOSPADM

## 2017-02-14 RX ORDER — NALOXONE HYDROCHLORIDE 0.4 MG/ML
.1-.4 INJECTION, SOLUTION INTRAMUSCULAR; INTRAVENOUS; SUBCUTANEOUS
Status: DISCONTINUED | OUTPATIENT
Start: 2017-02-14 | End: 2017-02-14 | Stop reason: HOSPADM

## 2017-02-14 RX ORDER — CEFAZOLIN SODIUM 1 G/3ML
1 INJECTION, POWDER, FOR SOLUTION INTRAMUSCULAR; INTRAVENOUS SEE ADMIN INSTRUCTIONS
Status: DISCONTINUED | OUTPATIENT
Start: 2017-02-14 | End: 2017-02-14 | Stop reason: HOSPADM

## 2017-02-14 RX ORDER — CIPROFLOXACIN 500 MG/1
500 TABLET, FILM COATED ORAL 2 TIMES DAILY
Qty: 2 TABLET | Refills: 0 | Status: SHIPPED | OUTPATIENT
Start: 2017-03-29 | End: 2017-03-30

## 2017-02-14 RX ADMIN — BUPIVACAINE HYDROCHLORIDE AND EPINEPHRINE BITARTRATE 20 ML: 2.5; .005 INJECTION, SOLUTION INFILTRATION; PERINEURAL at 07:53

## 2017-02-14 RX ADMIN — ROCURONIUM BROMIDE 20 MG: 10 INJECTION INTRAVENOUS at 08:43

## 2017-02-14 RX ADMIN — SUGAMMADEX 200 MG: 100 INJECTION, SOLUTION INTRAVENOUS at 11:37

## 2017-02-14 RX ADMIN — PROPOFOL 200 MG: 10 INJECTION, EMULSION INTRAVENOUS at 08:18

## 2017-02-14 RX ADMIN — FENTANYL CITRATE 50 MCG: 50 INJECTION, SOLUTION INTRAMUSCULAR; INTRAVENOUS at 08:34

## 2017-02-14 RX ADMIN — BUPIVACAINE 20 ML: 13.3 INJECTION, SUSPENSION, LIPOSOMAL INFILTRATION at 07:50

## 2017-02-14 RX ADMIN — DEXAMETHASONE SODIUM PHOSPHATE 8 MG: 4 INJECTION, SOLUTION INTRAMUSCULAR; INTRAVENOUS at 08:45

## 2017-02-14 RX ADMIN — FENTANYL CITRATE 100 MCG: 50 INJECTION, SOLUTION INTRAMUSCULAR; INTRAVENOUS at 08:10

## 2017-02-14 RX ADMIN — LIDOCAINE HYDROCHLORIDE 100 MG: 20 INJECTION, SOLUTION INFILTRATION; PERINEURAL at 08:18

## 2017-02-14 RX ADMIN — MIDAZOLAM HYDROCHLORIDE 2 MG: 1 INJECTION, SOLUTION INTRAMUSCULAR; INTRAVENOUS at 08:05

## 2017-02-14 RX ADMIN — ROCURONIUM BROMIDE 10 MG: 10 INJECTION INTRAVENOUS at 09:49

## 2017-02-14 RX ADMIN — ROCURONIUM BROMIDE 20 MG: 10 INJECTION INTRAVENOUS at 10:06

## 2017-02-14 RX ADMIN — ROCURONIUM BROMIDE 10 MG: 10 INJECTION INTRAVENOUS at 10:40

## 2017-02-14 RX ADMIN — FENTANYL CITRATE 25 MCG: 50 INJECTION, SOLUTION INTRAMUSCULAR; INTRAVENOUS at 11:41

## 2017-02-14 RX ADMIN — HYDROMORPHONE HYDROCHLORIDE 0.3 MG: 10 INJECTION, SOLUTION INTRAMUSCULAR; INTRAVENOUS; SUBCUTANEOUS at 12:57

## 2017-02-14 RX ADMIN — FENTANYL CITRATE 50 MCG: 50 INJECTION, SOLUTION INTRAMUSCULAR; INTRAVENOUS at 11:04

## 2017-02-14 RX ADMIN — ATROPA BELLADONNA AND OPIUM 1 SUPPOSITORY: 16.2; 3 SUPPOSITORY RECTAL at 13:29

## 2017-02-14 RX ADMIN — ROCURONIUM BROMIDE 50 MG: 10 INJECTION INTRAVENOUS at 08:18

## 2017-02-14 RX ADMIN — FENTANYL CITRATE 50 MCG: 50 INJECTION, SOLUTION INTRAMUSCULAR; INTRAVENOUS at 08:50

## 2017-02-14 RX ADMIN — FENTANYL CITRATE 50 MCG: 50 INJECTION, SOLUTION INTRAMUSCULAR; INTRAVENOUS at 08:54

## 2017-02-14 RX ADMIN — SODIUM CHLORIDE, POTASSIUM CHLORIDE, SODIUM LACTATE AND CALCIUM CHLORIDE: 600; 310; 30; 20 INJECTION, SOLUTION INTRAVENOUS at 07:41

## 2017-02-14 RX ADMIN — ROCURONIUM BROMIDE 20 MG: 10 INJECTION INTRAVENOUS at 09:15

## 2017-02-14 RX ADMIN — CEFAZOLIN SODIUM 2 G: 2 INJECTION, SOLUTION INTRAVENOUS at 08:32

## 2017-02-14 RX ADMIN — ONDANSETRON 4 MG: 2 INJECTION INTRAMUSCULAR; INTRAVENOUS at 11:15

## 2017-02-14 RX ADMIN — CEFAZOLIN 1 G: 1 INJECTION, POWDER, FOR SOLUTION INTRAMUSCULAR; INTRAVENOUS at 10:32

## 2017-02-14 RX ADMIN — HYDROMORPHONE HYDROCHLORIDE 0.5 MG: 1 INJECTION, SOLUTION INTRAMUSCULAR; INTRAVENOUS; SUBCUTANEOUS at 10:06

## 2017-02-14 RX ADMIN — HYDROMORPHONE HYDROCHLORIDE 0.5 MG: 1 INJECTION, SOLUTION INTRAMUSCULAR; INTRAVENOUS; SUBCUTANEOUS at 09:06

## 2017-02-14 RX ADMIN — FENTANYL CITRATE 50 MCG: 50 INJECTION, SOLUTION INTRAMUSCULAR; INTRAVENOUS at 12:25

## 2017-02-14 ASSESSMENT — PAIN DESCRIPTION - DESCRIPTORS
DESCRIPTORS: SPASM
DESCRIPTORS: THROBBING
DESCRIPTORS: SPASM
DESCRIPTORS: THROBBING
DESCRIPTORS: THROBBING
DESCRIPTORS: SPASM
DESCRIPTORS: THROBBING
DESCRIPTORS: SPASM
DESCRIPTORS: SPASM

## 2017-02-14 NOTE — BRIEF OP NOTE
[facility], [place]    Brief Operative Note    Pre-operative diagnosis: Vesicovaginal Fistula   Post-operative diagnosis * No post-op diagnosis entered *  Procedure: Procedure(s):  Davinci Assisted Vesicovaginal Fistula Repair, Cystoscopy, Right Retrograde pyelogram, and midurethral sling  - Wound Class: II-Clean Contaminated  Surgeon: Surgeon(s) and Role:     * Aubrey Boyd MD - Primary     * Radha Houston MD - Resident - Assisting  Anesthesia: General   Estimated blood loss: Less than 50 ml  Drains: 16 Paraguayan neri catheter  Specimens: * No specimens in log *  Findings:   vesicovaginal fistula about 2 cm wide at right lateral posterior trigone. .  Complications: None.  Implants: Matristem acellular graft

## 2017-02-14 NOTE — OP NOTE
Pre-op Dx: Vesicovaginal fistula    Post-op Dx: Same   Procedure performed:   1. Robotic assisted vesicovaginal fistula repair with matristem acellular graft and omental interposition.  2. Placement of Midurethral Sling  3. Cystoscopy with right retrograde pyelogram and temporary bilateral ureteral catheter placement.   4. Lysis of adhesions 30 minutes.  Surgeon: Aubrey Boyd MD   Assistant surgeons: Radha Houston MD  Anesthesia: general   EBL: 30 cc   Complications: None   Disposition: Stable to PACU   Clinical Indication: Ms. Crista Ma is a 46 year old female with history of stress urinary incontinence as well as  incidental cystotomy at time of  transabdominal hysterectomy with subsequent development of vesicovaginal fistula.  She presented for repair and we discussed different management options including transvaginal repair and transabdominal repair and she elected to proceed with the latter robotically.  Operative Procedure:  The patient was identified corrected, consented and taken to the operating room.  After being induced with general anesthesia and given kai-operative antibiotics the pt. Was placed in the dorsal lithotomy position and secured into placed with foam pads and tape to make sure she stayed in place when in the trendelenburg position.  Once secured the patient underwent a pelvic and abdominal prep and she was draped in the usual sterile fashion.    We began the procedure by placing a 22 Cape Verdean cystoscope into a well lubricated urethra. The urethra was unremarkable. The bilateral ureteral orifice were identified. At the right posterior trigone there was a large vesicovaginal fistula noted that was at least 2 cm across.   We turned our attention to placing the ureteral catheters. A sensor wire was advanced through the right ureteral orifice to the right renal pelvis. The 5 Cape Verdean open ended catheter was advanced over the wire and the wire was removed. This was repeated on the  left. Finally, we placed the sensor wire through the vesical side of the fistula tract and passed it out through the vagina. The cystoscope was removed and a 10 Telugu catheter was advanced over the wire from the vaginal side and the balloon was inflated with 10 cc. We then were able to cystoscopically confirm that the catheter bridged the fistula and the balloon was in the bladder.   We then turned our attention to the abdominal approach. At this point the superior aspect of the umbilicus was anesthetized with 1% lidocaine and a small incision was made.  Using the Veress needle the abdomen was entered and we confirmed placement into the peritoneal cavity by the presence of  Low opening pressures and by using the liquid drop test.  The abdomen was then insuflated to 14 cm of H2O and a non-cutting 12 mm trocar and port was used to enter the peritoneal cavity.  The other trocar sites were marked at approximately 10 cm on either side of the camera port and then as lateral as possible.  We placed 8 mm ports throughout for all the robotic arms and then a 12 mm port was used for the assistant.  The patient was then placed in Trendelenburg and the robot was docked to the side.  We surveyed the abdomen, there was omentum adhered to the lower abdominal wall. Therefore, At the console with a grasper in the left hand and a monopolar scissors in the right we took down these adhesions. We then noted that there was colon adhered to the prior hysterectomy site. These adhesions were also taken down. Lysis of ahdesions time was about 30 minutes.   We then proceeded to identify the plane between the vagina and the bladder. The dissection of the bladder off the vaginal was carried out with the aid of an EEA sizer in the vagina.  This dissection was made more challenging by dense scar tissue.   We then turned our dissection towards delineating the fistula tract. Once dissected out we incised across the fistula tract and found the  pre-placed catheter across the fistula tract. The catheter at this time was removed. We then mobilized the edges of the bladder and the vagina around the fistula to facilitate our closure.   Once adequate vaginal and bladder mobilization was complete we closed the vaginal aspect of the fistula with a 2-0 running vicryl suture. This was repeated for the bladder aspect of the fistula. We then performed a leak test and found there to be no leak.   We then introduced the Matristem Acellular matrix sheet a 9x12cm sheet was cut into 2 smaller pieces. These were interposed between the vaginal and vesical suture lines. This was secured using a running 3-0 V-lock suture.   We then turned our attention to mobilizing the omentum and found that it reached easily into the pelvis without need for further mobilization. A flap of omentum was interposed between the two sheets of matristem graft and secured using 3-0 Vlock suture.   We surveyed the abdomen, there was no bleeding.  The robot was undocked and ports were removed.  Skin closure was then performed on all the remainder ports using a 4-0 monocryl and dermabond was applied.    At this point we directed our attention to the midurethral sling.  An Allis clamp was used to grasp the vaginal mucosa approximately 1 cm from the urethral meatus at the mid urethra. A 25-gauge needle, was used to inject 1% lidocaine with epinephrine to hydrodissect the tissues. We then made a 1-cm incision through the vaginal mucosa with a #15 blade. We then dissected this laterally out passed the fornices of the vagina in the pubocervical fascia, out laterally to the pelvic side wall, first on the right-hand side and then on the left hand side. We then placed the MiniArc Precise on the trocar and placed this first through the obturator membrane on the left side and then deployed it and then placed it on the right hand side.   We then placed a 20-Grenadian cystoscope through the urethra and into the  bladder, noting bilateral ureteral orifices effluxing clear urine. The floor of the bladder was intact as well as the bladder neck and the lateral edges. The urethra was without tape. The bilateral ureteral catheters were removed. Due to the proximity of the fistula to the right ureteral orifice we elected to proceed with a right retro grade pyelogram. Therefore, the right ureteral orifice was intubated using the sensor wire and the 5 Irish catheter. A retrograde pyelogram showed no extravasation, no j-hooking and good wash out and no hydronephrosis. The 5 Irish catheter was removed.   We then withdrew the cystoscope with a full bladder and pressed on the suprapubic region and did not note any incontinence from the urethral meatus.   At this time, we then deployed the right arm of the mesh.  A running 2-0 Vicryl was then used to reapproximate the mucosa of the vagina after irrigation. The catheter was replaced and a vaginal packing with premarin cream was put in place.    The patient was then returned to the supine position and transported to recovery in stable condition.     Patient was seen, evaluated and plan was formulated in conjunction with me and I agree with the above.  I was present for the entire procedure.  Aubrey Boyd MD

## 2017-02-14 NOTE — ANESTHESIA PROCEDURE NOTES
Peripheral Nerve Block Procedure Note    Staff:     Anesthesiologist:  CLINT LIRA    Resident/CRNA:  DAWSON SPEARS    Block performed by resident/CRNA in the presence of a teaching physician    Location: Pre-op  Procedure Start/Stop TImes:      2/14/2017 7:45 AM     2/14/2017 7:53 AM    patient identified, IV checked, site marked, risks and benefits discussed, informed consent, monitors and equipment checked, pre-op evaluation, at physician/surgeon's request and post-op pain management      Correct Patient: Yes      Correct Position: Yes      Correct Site: Yes      Correct Procedure: Yes      Correct Laterality:  Yes    Site Marked:  Yes  Procedure details:     Procedure:  TAP    ASA:  2    Laterality:  Bilateral    Position:  Supine    Sterile Prep: chloraprep, mask and sterile gloves      Local skin infiltration:  2% lidocaine    amount (mL):  2    Needle:  Short bevel and insulated    Needle gauge:  21    Needle length (inches):  4    Ultrasound: Yes      Ultrasound used to identify targeted nerve, plexus, or vascular structure and placed a needle adjacent to it      Permanent Image entered into patiient's record      Abnormal pain on injection: No      Blood Aspirated: No      Paresthesias:  No    Bleeding at site: No      Bolus via:  Needle    Infusion Method:  Single Shot    Complications:  None  Assessment/Narrative:      Informed consent obtained.  All risks and benefits of the nerve block discussed with the patient.  All questions answered and all parties agreed with the plan.

## 2017-02-14 NOTE — ANESTHESIA PREPROCEDURE EVALUATION
Anesthesia Evaluation     .        ROS/MED HX    ENT/Pulmonary:       Neurologic:     (+)migraines,     Cardiovascular:         METS/Exercise Tolerance:     Hematologic:         Musculoskeletal:         GI/Hepatic:         Renal/Genitourinary:         Endo:         Psychiatric:         Infectious Disease:         Malignancy:         Other:               Physical Exam  Normal systems: cardiovascular, pulmonary and dental    Airway   Mallampati: II  TM distance: >3 FB  Neck ROM: full    Dental     Cardiovascular       Pulmonary    breath sounds clear to auscultation                    Anesthesia Plan      History & Physical Review  History and physical reviewed and following examination; no interval change.    ASA Status:  2 .        Plan for General and ETT with Intravenous induction. Maintenance will be Balanced.    PONV prophylaxis:  Ondansetron (or other 5HT-3) and Dexamethasone or Solumedrol  Additional equipment: 2nd IV Procedure:   DAVINCI ASSISTED VESICOVAGINAL ABDOMINAL, CYSTOSCOPY (N/A Abdomen) Davinci Assisted Vesicovaginal Fistula Repair, Cystoscopy        Anesthesia type: General    Pre-op diagnosis: Vesicovaginal Fistula     Location: [facility] OR 06 / [facility]    Surgeon: Aubrey Boyd MD      MARGARET risk factors snores loudly, asthma ,occ GERD    Plan:GAET. IVx2. NPO after 18:30. Lungs CTA with forced exh.  Surgeon to add midurethral sling to procedure.  ETT 12/27/16; 0735; Mask Ventilation: Easy; Ease of Intubation: Easy; Airway Size: 7;  Cuffed;  Oral;  Blade Type: Singleton;  Blade Size: 2;  Place by: [facility];  Insertion Attempts: 1;  Breath Sounds: Equal, clear and bilateral;  End Tidal CO2: Present;  Dentition: Intact, Unchanged;  Grade View of Cords: 1       Postoperative Care  Postoperative pain management:  IV analgesics.      Consents  Anesthetic plan, risks, benefits and alternatives discussed with:  Patient..          Crista Ma [[id number]]  Female - 46 year old - [birth year]  DAVINCI ASSISTED  VESICOVAGINAL ABDOMINAL, CYSTOSCOPY (N/A Abdomen)       Preprocedure Vitals      BP:   129/83   Pulse:   88   Resp:      SpO2:      Temp:      Height:      Weight:      BMI:      IBW:      Last edited 02/13/17 1340 by TC          Allergies      NO KNOWN DRUG ALLERGIES       Prescription Medications         Last Taken Last Updated     tolterodine (DETROL LA) 4 MG 24 hr capsule    Taking 02/13/17 1341     oxyCODONE (ROXICODONE) 5 MG IR tablet    Taking 02/13/17 1341     Montelukast Sodium (SINGULAIR PO)    Taking 02/13/17 1341     budesonide-formoterol (SYMBICORT) 160-4.5 MCG/ACT Inhaler    Taking 02/13/17 1341     ALBUTEROL 108 (90 BASE) MCG/ACT inhaler    Taking 02/13/17 1341       Hematology Labs        WBC        5.0 01/31/17 0739       Electrolyte Labs        K+    Na+        4.1 01/31/17 0739 141 01/31/17 0739       Other Labs        No relevant labs found       Last Menstrual Period      LMP        12/06/2016 (Exact Date)         Substance History      Smoking Status: Never Smoker     Smokeless Tobacco Status: Unknown     Alcohol use: 6.0 oz per week     Drug use: No       Facility Administered Medications      No medications found       Anesthesia Family History      C.A.D. No family hx of     CANCER No family hx of     DIABETES No family hx of     Family History Negative Mother     Hypertension No family hx of     Unknown/Adopted Father       Problem List      Mild persistent asthma without complication Menorrhagia     Dysmenorrhea HARRY (stress urinary incontinence, female)     Post-op pain VVF (vesicovaginal fistula)       Medical History        Unspecified asthma(493.90) Allergic rhinitis, cause unspecified     Pneumothorax Seasonal affective disorder (H)     Dysmenorrhea        Surgical History      HC ENLARGE BREAST WITH IMPLANT wisdom teeth     LAPAROSCOPIC ASSISTED HYSTERECTOMY VAGINAL LAPAROSCOPIC SALPINGECTOMY     CYSTOSCOPY HYSTERECTOMY TOTAL ABDOMINAL, SALPINGECTOMY     REPAIR BLADDER HYSTERECTOMY,  PAP NO LONGER INDICATED       Obstetric History      The patient has not been asked about pregnancy.            Aubrey Boyd MD Physician Signed  Progress Notes   Date of Service: 2/13/2017  1:45 PM Note Created: 2/13/2017  2:15 PM         []Hide copied text  []Hover for attribution information  Reason for Visit: Discuss placing a midurethral sling at time of VVF repair.     Clinical Data: Ms. Ma is a 47 y/o female with hx of VVF during a hx and cystotomy. She also has stress incontinence and wishes to have a midurethral sling placed at the same time.     A/P: 47 y/o female with stress urinary incontinence as well as VVF. She is very bothered by her stress incontinence and wishes to have a sling at the same time. We discussed the use of mesh tape in surgery and the risks of infection, bleeding, exposure of mesh, vaginal pain, injury to the bladder/urethra/rectum, as well as risk of continued incontinence or urinary retention. We discussed the FDA public health notification at length. The pt. Verbalized understanding and had a chance to ask her questions which were all answered.     -add midurethral sling to procedure tomorrow.  Thank you for allowing me to participate in the care of Ms. Crista Ma and I will keep you updated on her progress.     Aubrey Boyd MD  25 min spent with patient, >50% in discussion and coordination of care.                         Crista Ma [[id number]]  Female - 46 year old - [birth year]  DAVINCI ASSISTED VESICOVAGINAL ABDOMINAL, CYSTOSCOPY (N/A Abdomen)       Hematology Labs        WBC    RBC    Hematocrit    Hemoglobin    Plt    MCV        5.0 01/31/17 0739 3.96 01/31/17 0739 38.9 01/31/17 0739 12.7 01/31/17 0739 301 01/31/17 0739 98 01/31/17 0739     8.3 12/28/16 1030 3.30  12/28/16 1030 32.2  12/28/16 1030 10.9  12/28/16 1030 185 12/28/16 1030 98 12/28/16 1030       Urine Labs        BLOOD UA    WBC UA    NITRITE UA        Trace  01/18/17 1345 2-5  01/18/17 1345  Positive  01/18/17 1345       Pregnancy Labs        HCG Qual Urine        Negative 12/27/16 0605       Chemistry Labs        Na+    K+    Ca2+    Cl    BUN    CRT        141 01/31/17 0739 4.1 01/31/17 0739 9.1 01/31/17 0739 106 01/31/17 0739 11 01/31/17 0739 0.75 01/31/17 0739     140 12/28/16 1030 4.5 12/28/16 1030 8.3  12/28/16 1030 105 12/28/16 1030 5  12/28/16 1030 0.72 12/30/16 0740               0.72 12/28/16 1030       Blood Gases        No relevant labs found       Coagulation Labs        No relevant labs found       Electrolyte Labs        Na    Cl        141 01/31/17 0739 106 01/31/17 0739     140 12/28/16 1030 105 12/28/16 1030       Other Labs        No relevant labs found             .

## 2017-02-14 NOTE — IP AVS SNAPSHOT
MRN:[id number]                      After Visit Summary   2/14/2017    Crista aM    MRN: [id number]           Thank you!     Thank you for choosing [facility] for your care. Our goal is always to provide you with excellent care. Hearing back from our patients is one way we can continue to improve our services. Please take a few minutes to complete the written survey that you may receive in the mail after you visit with us. Thank you!        Patient Information     Date Of Birth          1970        About your hospital stay     You were admitted on:  February 14, 2017 You last received care in the:  Post Anesthesia Care Unit [facility]    You were discharged on:  February 14, 2017       Who to Call     For medical emergencies, please call 911.  For non-urgent questions about your medical care, please call your primary care provider or clinic, None  For questions related to your surgery, please call your surgery clinic        Attending Provider     Provider Aubrey Lau MD Urology       Primary Care Provider    [facility]       No address on file        After Care Instructions     Discharge Instructions       Activity  - No strenuous exercise for 6 weeks.  - No lifting, pushing, pulling more than 10 pounds for 6 weeks.   - Do not strain with bowel movements.  - Do not drive until you can press the brake pedal quickly and fully without pain.   - Do not operate a motor vehicle while taking narcotic pain medications.     Incisions  - You may shower and get incisions wet starting 48 hrs after surgery.  - Do not scrub incisions or submerge wounds for 2 weeks or until seen in follow-up.   - Remove wound dressing 48 hours after surgery.   - If purple dermabond glue was used, avoid applying any lotions or ointments.   - Leave incision open to air. Cover with gauze only if needed for comfort or to protect clothing from drainage.   - The stitches do not  "need to be removed, they will dissolve on their own.    URINATION  - Blood tinged urine is expected after urologic procedures.   - Drink plenty of fluids to keep urine clear.  - You were discharged with a catheter, keep it secured to your leg at all times. If catheter becomes clogged, call the numbers below immediately.    Medications  - Transition from narcotic pain medications to tylenol (acetaminophen) as you are able.  Wean yourself off all pain medications as you are able.  - Some pain medications contain both tylenol (acetaminophen) and a narcotic (Norco, vicodin, percocet), do not take more than 4,000mg of Tylenol (acetaminophen) from all sources in any 24 hour period.  - Narcotics can make you constipated.  Take over the counter fiber (metamucil or benefiber) and stool softeners (miralax, docusate or senna) while taking narcotic pain medications, but stop if you develop diarrhea.  - No driving or operating machinery while taking narcotic pain medications     Follow-Up:  - Follow-up with Dr. Boyd in 6 weeks with x-ray cystogram to be performed prior to your appointment for catheter removal.     - Call or return sooner than your regularly scheduled visit if you develop any of the following: fever (greater than 101.5), uncontrolled pain, uncontrolled nausea or vomiting, as well as increased redness, swelling, or drainage from your wound.     Phone numbers:   - Monday through Friday 8am to 4:30pm: Call [phone] with routine questions or to schedule or confirm appointment.    - For URGENT questions or concerns on Nights or weekends: call the after hours emergency pager - [phone] and tell the  \"I would like to page the Urology Resident on call.\"   - For emergencies, call 911                  Your next 10 appointments already scheduled     Feb 27, 2017 10:30 AM CST   [facility] with Aubrey Boyd MD   [facility]    [address] [place] [address] " [facility] [address]   [phone]            Mar 27, 2017  4:00 PM CDT   [facility] Visit with Aubrey Boyd MD   [facility]    [address] [place] [address]   [phone]              Further instructions from your care team       [facility] [place]  Same-Day Surgery   Adult Discharge Orders & Instructions     For 24 hours after surgery    1. Get plenty of rest.  A responsible adult must stay with you for at least 24 hours after you leave the hospital.   2. Do not drive or use heavy equipment.  If you have weakness or tingling, don't drive or use heavy equipment until this feeling goes away.  3. Do not drink alcohol.  4. Avoid strenuous or risky activities.  Ask for help when climbing stairs.   5. You may feel lightheaded.  IF so, sit for a few minutes before standing.  Have someone help you get up.   6. If you have nausea (feel sick to your stomach): Drink only clear liquids such as apple juice, ginger ale, broth or 7-Up.  Rest may also help.  Be sure to drink enough fluids.  Move to a regular diet as you feel able.  7. You may have a slight fever. Call the doctor if your fever is over 100 F (37.7 C) (taken under the tongue) or lasts longer than 24 hours.  8. You may have a dry mouth, a sore throat, muscle aches or trouble sleeping.  These should go away after 24 hours.  9. Do not make important or legal decisions.   Call your doctor for any of the followin.  Signs of infection (fever, growing tenderness at the surgery site, a large amount of drainage or bleeding, severe pain, foul-smelling drainage, redness, swelling).    2. It has been over 8 to 10 hours since surgery and you are still not able to urinate (pass water).    3.  Headache for over 24 hours.    4.  Numbness, tingling or weakness the day after surgery (if you had spinal anesthesia).  To contact a doctor, call  "________________________________________ or:        [phone] and ask for the resident on call for   ______________________________________________ (answered 24 hours a day)      Emergency Department:    [facility]: [phone]       (TTY for hearing impaired: [phone])    .       Tips for taking pain medications  To get the best pain relief possible , remember these points:      Take pain medications as directed, before pain becomes severe      Pain medication can upset your stomach: taking it with food may help      Constipation is a common side effect of pain medication. Drink plenty of  Fluids      Eat foods high in fiber. Take a stool softener  if recommended by your doctor or  Pharmacist.        Do not drink alcohol, drive or operate machinery while taking pain medications.      Ask about other ways to control pain, such as with heat, ice or relaxation.          Additional Information     If you use hormonal birth control (such as the pill, patch, ring or implants): You'll need a second form of birth control for 7 days (condoms, a diaphragm or contraceptive foam). While in the hospital, you received a medicine called Bridion. Your normal birth control will not work as well for a week after taking this medicine.          Pending Results     No orders found from 2/12/2017 to 2/15/2017.            Admission Information     Date & Time Provider Department Dept. Phone    2/14/2017 Aubrey Boyd MD Post Anesthesia Care Unit [facility] [phone]      Your Vitals Were     Blood Pressure Temperature Respirations Height Weight Last Period    102/76 98.1  F (36.7  C) (Axillary) 16 1.714 m (5' 7.48\") 92.8 kg (204 lb 9.4 oz) 12/06/2016 (Exact Date)    Pulse Oximetry BMI (Body Mass Index)                98% 31.59 kg/m2          MyChart Information     [employer] gives you secure access to your electronic health record. If you see a primary care provider, you can also send messages to your care team " and make appointments. If you have questions, please call your primary care clinic.  If you do not have a primary care provider, please call [phone] and they will assist you.        Care EveryWhere ID     This is your Care EveryWhere ID. This could be used by other organizations to access your [facility] medical records  [id number]           Review of your medicines      START taking        Dose / Directions    ciprofloxacin 500 MG tablet   Commonly known as:  CIPRO   Used for:  VVF (vesicovaginal fistula)        Dose:  500 mg   Start taking on:  3/29/2017   Take 1 tablet (500 mg) by mouth 2 times daily for 1 day Take on day of cystogram in morning and evening.   Quantity:  2 tablet   Refills:  0         CONTINUE these medicines which have NOT CHANGED        Dose / Directions    albuterol 108 (90 BASE) MCG/ACT Inhaler   Used for:  Jose-colored stools   Generic drug:  albuterol        Dose:  1-2 puff   Inhale 1-2 puffs into the lungs every 4 hours as needed   Refills:  3       budesonide-formoterol 160-4.5 MCG/ACT Inhaler   Commonly known as:  SYMBICORT        Dose:  1-2 puff   Inhale 1-2 puffs into the lungs 2 times daily   Refills:  0       oxyCODONE 5 MG IR tablet   Commonly known as:  ROXICODONE   Used for:  Post-op pain        Dose:  5-10 mg   Take 1-2 tablets (5-10 mg) by mouth every 3 hours as needed for moderate to severe pain   Quantity:  30 tablet   Refills:  0       SINGULAIR PO        Dose:  10 mg   Take 10 mg by mouth At Bedtime   Refills:  0       tolterodine 4 MG 24 hr capsule   Commonly known as:  DETROL LA   Used for:  Bladder spasms        Dose:  4 mg   Take 1 capsule (4 mg) by mouth daily   Quantity:  90 capsule   Refills:  1            Where to get your medicines      These medications were sent to [facility] - [place], [place] - [address]  [address], [facility] [address]     Phone:  [phone]     ciprofloxacin 500 MG tablet    tolterodine 4 MG 24 hr  capsule         Some of these will need a paper prescription and others can be bought over the counter. Ask your nurse if you have questions.     Bring a paper prescription for each of these medications     oxyCODONE 5 MG IR tablet                Protect others around you: Learn how to safely use, store and throw away your medicines at www.disposemymeds.org.             Medication List: This is a list of all your medications and when to take them. Check marks below indicate your daily home schedule. Keep this list as a reference.      Medications           Morning Afternoon Evening Bedtime As Needed    albuterol 108 (90 BASE) MCG/ACT Inhaler   Inhale 1-2 puffs into the lungs every 4 hours as needed   Generic drug:  albuterol                                budesonide-formoterol 160-4.5 MCG/ACT Inhaler   Commonly known as:  SYMBICORT   Inhale 1-2 puffs into the lungs 2 times daily                                ciprofloxacin 500 MG tablet   Commonly known as:  CIPRO   Take 1 tablet (500 mg) by mouth 2 times daily for 1 day Take on day of cystogram in morning and evening.   Start taking on:  3/29/2017                                oxyCODONE 5 MG IR tablet   Commonly known as:  ROXICODONE   Take 1-2 tablets (5-10 mg) by mouth every 3 hours as needed for moderate to severe pain                                SINGULAIR PO   Take 10 mg by mouth At Bedtime                                tolterodine 4 MG 24 hr capsule   Commonly known as:  DETROL LA   Take 1 capsule (4 mg) by mouth daily                                          More Information        Pain Management After Surgery  Once you re home after surgery, you may have some pain, since even minor surgery causes swelling and breakdown of tissue. When it comes to effective pain management, the tips you may have learned in the hospital also work at home. To get the best pain relief possible, remember these points:  Special note: Be sure to follow any specific  post-operative instructions from your surgeon or nurse.     Use your medicine only as directed    If your pain is not relieved or if it gets worse, call your health care provider.    If pain lessens, try taking your medicine less often or in smaller doses.  Remember that medicines need time to work    Most pain relievers taken by mouth need at least 20 to 30 minutes to take effect. They may not reach their maximum effect for close to an hour.     Take pain medicine at regular times as directed. Don t wait until the pain gets bad to take it.  Time your medicine    Try to time your medicine so that you take it before beginning an activity, such as dressing or sitting at the table for dinner.    Taking your medicine at night may help you get a good night s rest.  Eat lots of fruit and vegetables    Constipation is a common side effect with some pain medicines. Eating fruit, vegetables, and other foods high in fiber can help.    Drink lots of fluids.    Talk to your health care provider about taking a preventive bowel regimen.  Avoid drinking alcohol while taking pain medicine    Mixing alcohol and pain medicine can cause dizziness and slow your respiratory system. It can even be fatal.  Avoid driving or operating machinery while taking pain medicines that can cause drowsiness.    8655-7832 [employer]. [address] [place] [address]. All rights reserved. This information is not intended as a substitute for professional medical care. Always follow your healthcare professional's instructions.                Garcia Catheter Care    A Garcia catheter is a rubber tube that is placed through the urethra (opening where urine comes out) and into the bladder. This helps drain urine from the bladder. There is a small balloon on the end of the tube that is inflated after insertion. This keeps the catheter from sliding out of the bladder.  A Garcia catheter is used to treat urinary retention (unable to pass  urine). It is also used when there is incontinence (loss of bladder control).  Home care    Finish taking any prescribed antibiotic even if you are feeling better before then.    It is important to keep bacteria from getting into the collection bag. Do not disconnect the catheter from the collection bag.    Use a leg band to secure the drainage tube, so it does not pull on the catheter. Drain the collection bag when it becomes full using the drain spout at the bottom of the bag.    Do not try to pull or remove your catheter. This will injure your urethra. It must be removed by a doctor or nurse.  Follow-up care  Follow up with your doctor as advised for repeat urine testing and catheter removal or replacement.  When to seek medical care  Get prompt medical attention if any of the following occur:    Fever of 100.4 F (38 C) or higher, or as directed by your health care provider    Bladder pain or fullness    Abdominal swelling, nausea or vomiting or back pain    Blood or urine leakage around the catheter    Bloody urine coming from the catheter (if a new symptom)    Catheter falls out    Catheter stops draining for 6 hours    Weakness, dizziness or fainting    2791-5687 [employer]. [address] [place] [address]. All rights reserved. This information is not intended as a substitute for professional medical care. Always follow your healthcare professional's instructions.                Discharge Instructions: Caring for Your Indwelling Urinary Catheter  You have been discharged with an indwelling urinary catheter (also called a Garcia catheter). A catheter is a thin, flexible tube. An indwelling urinary catheter has two parts. The first part is a tube that drains urine from your bladder. The second part is a bag or other device that collects the urine.  The most important thing to remember is that you want to prevent infection. Always wash your hands before handling your catheter bag or  tubing.  Draining the bedside bag    Wash your hands with soap and clean, running water or an alcohol-based hand  that contains at least 60% alcohol.    Hold the drainage tube over a toilet or measuring container.    Unclamp the tube and let the bag drain.    Don t touch the tip of the drainage tube or let it touch the toilet or container.  Cleaning the drainage tube    When the bag is empty, clean the tip of the drainage tube with an alcohol wipe.    Clamp the tube.    Reinsert the tube into the pocket on the drainage bag.  Cleaning your skin and tubing    Clean the skin near the catheter with soap and water.    Wash your genital area from front to back.    Wash the catheter tubing. Always wash the catheter in the direction away from your body.    You will be told when and how to change your bag and tubing.    Don t try to remove the catheter by yourself.    You may shower with the catheter in place.  Emptying a leg bag    Wash your hands.    Remove the stopper on the bag.    Drain the bag into the toilet or a measuring container. Don t let the tip of the drainage tube touch anything, including your fingers.    Clean the tip of the drainage tube with alcohol.    Replace the stopper.  Follow-up  Make a follow-up appointment as directed by your healthcare provider  When to call your healthcare provider  Call your healthcare provider right away if you have any of the following:    Chills or fever above 100.4 F (38 C)    Leakage around the catheter insertion site    Increased spasms (uncontrollable twitching) in your legs, abdomen, or bladder. Occasional mild spasms are normal.    Burning in the urinary tract, penis, or genital area    Nausea and vomiting    Aching in the lower back    Cloudy or bloody (pink or red) urine, sediment or mucus in the urine, or foul-smelling urine     2402-5059 [employer]. [address] [place] [address], [place] [address]. All rights reserved. This information is not  intended as a substitute for professional medical care. Always follow your healthcare professional's instructions.                Discharge Instructions: Caring for Your Leg Bag  You are going home with a urinary catheter and collection device (drainage bag) in place. One type of collection device is called a leg bag. This is a smaller drainage bag that you can wear on your leg to collect urine during the day. The bag can fit under your clothing. You can move around with greater ease when using a leg bag instead of a larger collection bag.  You were shown how to care for your catheter in the hospital. This sheet will help you remember those steps when you are at home.  Home care    Wash your hands thoroughly before and after you care for your catheter or collection device.    Gather your supplies:    Alcohol wipes    Soap and water    Towel and washcloth    Leg strap and leg bag    Use soap and water to wash the area where your catheter enters your body. Rinse well.    Secure the bag mckeon to your leg:    Position the leg band high on your thigh with the product label pointing away from your leg.    Stretch the leg band in place and fasten.    Place the catheter tubing over the bag and secure it. You may secure it with a Velcro tab or other method, depending on the product you use. Be sure to leave enough loop in the catheter above the leg band to avoid pulling on the tube.    Every 4 to 6 hours, reposition the band to prevent pressure from the elastic on your leg. You can do this by changing the bag to the other leg or by raising or lowering the leg band.    Wash the band as frequently as needed. You can hand wash and dry the leg band.    Place the bag in the bag mckeon.    Clean the urine bag end of the catheter and your catheter port with an alcohol wipe.    Place a towel under the bag and port to keep urine from dripping onto your leg.    Before connecting the outlet valve at the bottom of the bag to the  catheter, make sure that it is firmly closed. Flip the valve upward toward the bag; it needs to snap firmly in place. Be sure not to tug on the tubing. Be gentle.    Attach the urine bag to the end of the catheter; insert the connector snugly into the catheter port. You can avoid dribbling urine by bending the catheter tubing just below the tip and holding it while you disconnect it from the catheter. Be careful to keep the tip clean while connecting the leg bag tubing to the catheter--this keeps germs from getting into the system.    Drain the bag when it is full. To drain the bag, flip the clamp downward. Direct the flexible outlet tube to control the flow of urine. You don t have to disconnect the leg bag from the catheter to empty it. Raise your leg up to the edge of the toilet to reach the leg bag. Then you can empty the bag directly into the toilet. This way, you won t need to bend over, which may be uncomfortable.    Keep the leg bag clean. Rinse daily with equal parts water and vinegar to reduce odor and keep the bag free of germs.    Remember to keep the drainage bag below the level of your bladder for proper drainage.  Follow-up  Make a follow-up appointment as directed by your health care provider.  When to call your health care provider  Call your health care provider immediately if you have any of the following:    Redness, swelling, or warmth around the catheter entry site    Pus draining from your catheter entry site or into the catheter tubing and bag    Blood, clots, or floating debris in the urine    Nausea and vomiting    Shaking chills    Fever above 100.4 F (38 C)    Pain that is not relieved by medication    Catheter that falls out or is dislodged     8512-3892 [employer]. [address] [place] [address], [place] [address]. All rights reserved. This information is not intended as a substitute for professional medical care. Always follow your healthcare professional's  instructions.

## 2017-02-14 NOTE — IP AVS SNAPSHOT
Post Anesthesia Care Unit [address] [address]    Phone:  [phone]                                       After Visit Summary   2/14/2017    Crista Ma    MRN: [id number]           After Visit Summary Signature Page     I have received my discharge instructions, and my questions have been answered. I have discussed any challenges I see with this plan with the nurse or doctor.    ..........................................................................................................................................  Patient/Patient Representative Signature      ..........................................................................................................................................  Patient Representative Print Name and Relationship to Patient    ..................................................               ................................................  Date                                            Time    ..........................................................................................................................................  Reviewed by Signature/Title    ...................................................              ..............................................  Date                                                            Time

## 2017-02-14 NOTE — ANESTHESIA POSTPROCEDURE EVALUATION
Patient: Crista Ma    Procedure(s):  Davinci Assisted Vesicovaginal Fistula Repair, Cystoscopy, Right Retrograde pyelogram, and midurethral sling  - Wound Class: II-Clean Contaminated    Diagnosis:Vesicovaginal Fistula   Diagnosis Additional Information: No value filed.    Anesthesia Type:  General, ETT    Note:  Anesthesia Post Evaluation    Patient location during evaluation: PACU  Patient participation: Able to fully participate in evaluation  Level of consciousness: awake and alert  Pain management: adequate  Airway patency: patent  Cardiovascular status: acceptable  Respiratory status: acceptable  Hydration status: acceptable  PONV: none     Anesthetic complications: None          Last vitals:  Vitals:    02/14/17 1200 02/14/17 1215 02/14/17 1230   BP: 137/89 (!) 126/91 121/74   Resp: 14 16 8   Temp:   37.2  C (99  F)   SpO2: 100% 98% 98%         Electronically Signed By: Regis Gil MD  February 14, 2017  12:48 PM

## 2017-02-14 NOTE — DISCHARGE INSTRUCTIONS
[facility]  Same-Day Surgery   Adult Discharge Orders & Instructions     For 24 hours after surgery    1. Get plenty of rest.  A responsible adult must stay with you for at least 24 hours after you leave the hospital.   2. Do not drive or use heavy equipment.  If you have weakness or tingling, don't drive or use heavy equipment until this feeling goes away.  3. Do not drink alcohol.  4. Avoid strenuous or risky activities.  Ask for help when climbing stairs.   5. You may feel lightheaded.  IF so, sit for a few minutes before standing.  Have someone help you get up.   6. If you have nausea (feel sick to your stomach): Drink only clear liquids such as apple juice, ginger ale, broth or 7-Up.  Rest may also help.  Be sure to drink enough fluids.  Move to a regular diet as you feel able.  7. You may have a slight fever. Call the doctor if your fever is over 100 F (37.7 C) (taken under the tongue) or lasts longer than 24 hours.  8. You may have a dry mouth, a sore throat, muscle aches or trouble sleeping.  These should go away after 24 hours.  9. Do not make important or legal decisions.   Call your doctor for any of the followin.  Signs of infection (fever, growing tenderness at the surgery site, a large amount of drainage or bleeding, severe pain, foul-smelling drainage, redness, swelling).    2. It has been over 8 to 10 hours since surgery and you are still not able to urinate (pass water).    3.  Headache for over 24 hours.    4.  Numbness, tingling or weakness the day after surgery (if you had spinal anesthesia).  To contact a doctor, call ________________________________________ or:        [phone] and ask for the resident on call for   ______________________________________________ (answered 24 hours a day)      Emergency Department:    [facility]: [phone]       (TTY for hearing impaired: [phone])    .       Tips for taking pain medications  To get  the best pain relief possible , remember these points:      Take pain medications as directed, before pain becomes severe      Pain medication can upset your stomach: taking it with food may help      Constipation is a common side effect of pain medication. Drink plenty of  Fluids      Eat foods high in fiber. Take a stool softener  if recommended by your doctor or  Pharmacist.        Do not drink alcohol, drive or operate machinery while taking pain medications.      Ask about other ways to control pain, such as with heat, ice or relaxation.

## 2017-02-16 ENCOUNTER — CARE COORDINATION (OUTPATIENT)
Dept: UROLOGY | Facility: CLINIC | Age: 47
End: 2017-02-16

## 2017-02-16 NOTE — PROGRESS NOTES
I am in great pain but my urine is yellow with pink tinge, not bloody red like in the beginning. I still have a slight discharge from the vaginal area, but am using only 1-2 mini pads a day. No nausea.. no bowl movement yet but bad gas pains. I'm going to try a stool softener tonight.  My biggest issue is the pain. I  taking 2 - 5 mil tabs of oxy every 3 hours. I am taking Tylenol as a supplement, and a suppository 2 times a day. I am surprised how bad the pain is compared to my last surgery. It's hard to move and am very swollen in my lowers abdomin, I cannot stand up straight yet.  I will  the leg bag tomorrow at the clinic.     Crista

## 2017-02-17 DIAGNOSIS — N82.0 VESICO-VAGINAL FISTULA: Primary | ICD-10-CM

## 2017-02-17 RX ORDER — OXYCODONE AND ACETAMINOPHEN 5; 325 MG/1; MG/1
1 TABLET ORAL
Qty: 30 TABLET | Refills: 0 | Status: CANCELLED | OUTPATIENT
Start: 2017-02-17 | End: 2017-02-21

## 2017-02-17 NOTE — NURSING NOTE
Pt still having issues with pain. She continues to take oxycodone every 3 hours   She is adding tylenol   Requested refill from Dr Boyd granted. I asked Dr Jimenez to write script    My chart to pt     Chikis Fountain, RN   Care Coordinator Urology

## 2017-02-22 ENCOUNTER — OFFICE VISIT (OUTPATIENT)
Dept: UROLOGY | Facility: CLINIC | Age: 47
End: 2017-02-22

## 2017-02-22 VITALS
DIASTOLIC BLOOD PRESSURE: 79 MMHG | HEART RATE: 95 BPM | SYSTOLIC BLOOD PRESSURE: 123 MMHG | HEIGHT: 68 IN | WEIGHT: 200 LBS | BODY MASS INDEX: 30.31 KG/M2

## 2017-02-22 DIAGNOSIS — N39.3 SUI (STRESS URINARY INCONTINENCE, FEMALE): ICD-10-CM

## 2017-02-22 DIAGNOSIS — N82.0 VVF (VESICOVAGINAL FISTULA): Primary | ICD-10-CM

## 2017-02-22 ASSESSMENT — PAIN SCALES - GENERAL: PAINLEVEL: NO PAIN (0)

## 2017-02-22 NOTE — PROGRESS NOTES
Reason for Visit: f/u on leakage around catheter.     Clinical Data: Ms. Ma is a 45 y/o female with hx of VVF during a hx and cystotomy. She underwent a robot assisted VVF repair and midurethral sling on 2/14/17.  She complains of some leakage from the vaginal area that started this morning.  She has been very constipated and has been straining.    On exam:  There is some some tissue seen in the vaginal area and can't really identify whey it is.  There is also some urine in the vaginal vault but there is also a lot of urine in the bag.      A/P: 45 y/o female with stress urinary incontinence as well as VVF with some vaginal drainage.  Will increase the size of the catheter to 18 fr and monitor for now.  -f/u in 2 weeks for catheter exchange.  Pt. Will call to let me know if worsening.      Aubrey Boyd MD

## 2017-02-22 NOTE — MR AVS SNAPSHOT
After Visit Summary   2/22/2017    Crista Ma    MRN: [id number]           Patient Information     Date Of Birth          1970        Visit Information        Provider Department      2/22/2017 2:30 PM Aubrey Boyd MD [facility] Urology and [facility]        Today's Diagnoses     VVF (vesicovaginal fistula)    -  1    HARRY (stress urinary incontinence, female)           Follow-ups after your visit        Follow-up notes from your care team     Return in about 2 weeks (around 3/8/2017).      Your next 10 appointments already scheduled     Feb 27, 2017 10:30 AM CST   [facility]   [facility]    [address] [place] [id number] [phone]            Mar 27, 2017  4:00 PM CDT   Return Visit with Aubrey Boyd MD   [facility]    [address] [place] [id number] [phone]              Who to contact     Please call your clinic at [phone] to:    Ask questions about your health    Make or cancel appointments    Discuss your medicines    Learn about your test results    Speak to your doctor   If you have compliments or concerns about an experience at your clinic, or if you wish to file a complaint, please contact [facility] Patient Relations at [phone] or email us at [email or web address]         Additional Information About Your Visit        [employer] Information     [email or web address] gives you secure access to your electronic health record. If you see a primary care provider, you can also send messages to your care team and make appointments. If you have questions, please call your primary care clinic.  If you do not have a primary care provider, please call [phone] and they will assist you.      [email or web address] is an electronic gateway that provides easy, online access to  "your medical records. With [employer], you can request a clinic appointment, read your test results, renew a prescription or communicate with your care team.     To access your existing account, please contact your [facility] or call [phone] for assistance.        Care EveryWhere ID     This is your Care EveryWhere ID. This could be used by other organizations to access your [facility] medical records  [id number]        Your Vitals Were     Pulse Height Last Period BMI (Body Mass Index)          95 1.727 m (5' 8\") 12/06/2016 (Exact Date) 30.41 kg/m2         Blood Pressure from Last 3 Encounters:   02/22/17 123/79   02/14/17 125/84   02/13/17 129/83    Weight from Last 3 Encounters:   02/22/17 90.7 kg (200 lb)   02/14/17 92.8 kg (204 lb 9.4 oz)   01/31/17 91.6 kg (202 lb)              Today, you had the following     No orders found for display       Primary Care Provider    [facility]       No address on file        Thank you!     Thank you for choosing [facility] UROLOGY AND [facility] FOR PROSTATE AND UROLOGIC CANCERS  for your care. Our goal is always to provide you with excellent care. Hearing back from our patients is one way we can continue to improve our services. Please take a few minutes to complete the written survey that you may receive in the mail after your visit with us. Thank you!             Your Updated Medication List - Protect others around you: Learn how to safely use, store and throw away your medicines at www.disposemymeds.org.          This list is accurate as of: 2/22/17  2:59 PM.  Always use your most recent med list.                   Brand Name Dispense Instructions for use    albuterol 108 (90 BASE) MCG/ACT Inhaler   Generic drug:  albuterol      Inhale 1-2 puffs into the lungs every 4 hours as needed       budesonide-formoterol 160-4.5 MCG/ACT Inhaler    SYMBICORT     Inhale 1-2 puffs into the lungs 2 times daily       ciprofloxacin 500 " MG tablet   Start taking on:  3/29/2017    CIPRO    2 tablet    Take 1 tablet (500 mg) by mouth 2 times daily for 1 day Take on day of cystogram in morning and evening.       opium-belladonna 30-16.2 MG per suppository    B&O SUPPRETTES    6 suppository    Place 1 suppository rectally every 8 hours as needed for moderate pain or bladder spasms       oxyCODONE 5 MG IR tablet    ROXICODONE    30 tablet    Take 1-2 tablets (5-10 mg) by mouth every 3 hours as needed for moderate to severe pain       SINGULAIR PO      Take 10 mg by mouth At Bedtime       tolterodine 4 MG 24 hr capsule    DETROL LA    90 capsule    Take 1 capsule (4 mg) by mouth daily

## 2017-02-22 NOTE — LETTER
2/22/2017       RE: Crista Ma  [address] [address]     Dear Colleague,    Thank you for referring your patient, Crista Ma, to the [facility] UROLOGY AND INST FOR PROSTATE AND UROLOGIC CANCERS at [facility]. Please see a copy of my visit note below.    Reason for Visit: f/u on leakage around catheter.     Clinical Data: Ms. Ma is a 47 y/o female with hx of VVF during a hx and cystotomy. She underwent a robot assisted VVF repair and midurethral sling on 2/14/17.  She complains of some leakage from the vaginal area that started this morning.  She has been very constipated and has been straining.    On exam:  There is some some tissue seen in the vaginal area and can't really identify whey it is.  There is also some urine in the vaginal vault but there is also a lot of urine in the bag.      A/P: 47 y/o female with stress urinary incontinence as well as VVF with some vaginal drainage.  Will increase the size of the catheter to 18 fr and monitor for now.  -f/u in 2 weeks for catheter exchange.  Pt. Will call to let me know if worsening.      Aubrey Boyd MD

## 2017-03-06 ENCOUNTER — OFFICE VISIT (OUTPATIENT)
Dept: UROLOGY | Facility: CLINIC | Age: 47
End: 2017-03-06
Payer: COMMERCIAL

## 2017-03-06 DIAGNOSIS — Z46.6 URINARY CATHETER (FOLEY) CHANGE REQUIRED: Primary | ICD-10-CM

## 2017-03-06 PROCEDURE — 99024 POSTOP FOLLOW-UP VISIT: CPT | Performed by: UROLOGY

## 2017-03-06 RX ORDER — CIPROFLOXACIN 500 MG/1
500 TABLET, FILM COATED ORAL ONCE
Qty: 1 TABLET | Refills: 0
Start: 2017-03-06 | End: 2017-03-06

## 2017-03-06 NOTE — NURSING NOTE
Medication given per protocol for catheter change  Cipro 500mg po  Lot: [id number]   Exp: 03/2018  NDC: [phone]      Catheter insertion documentation on 3/6/2017:    Crista Ma presents to the clinic for catheter insertion.  Reason for insertion: scheduled insertion per Dr. Boyd  Catheter successfully inserted into the urethral meatus in the usual sterile fashion without immediate complication.  Type of catheter placed: 18 Armenian indwelling catheter  Urine is clear in color.  50 cc's of urine output returned.  Balloon was filled with 10 cc's of normal saline.  Securement device placed for the catheter.  The patient tolerated the procedure and was instructed to monitor for catheter dysfunction, monitor for pain or discomfort, return or call for pain, fever, leakage or decreased urine flow, watch for signs of infection and follow up with cystogram and appointment with Dr. Boyd as scheduled on 3/27/17.    Florina Win  General Surgery - Urology RN

## 2017-03-06 NOTE — MR AVS SNAPSHOT
After Visit Summary   3/6/2017    Crista Ma    MRN: [id number]           Patient Information     Date Of Birth          1970        Visit Information        Provider Department      3/6/2017 3:30 PM Aubrey Boyd MD [facility]        Today's Diagnoses     Urinary catheter (Garcia) change required    -  1       Follow-ups after your visit        Follow-up notes from your care team     Return in about 3 weeks (around 3/27/2017).      Your next 10 appointments already scheduled     Mar 27, 2017  3:00 PM CDT   XR CYSTOGRAM with MGXR1, MG NM RAD   [facility]    [address] [address] [place] [id number] [phone]           Please bring a list of your current medicines to your exam. (Include vitamins, minerals and over-thecounter medicines.) Leave your valuables at home.  Tell your doctor if there is a chance you may be pregnant.  You do not need to do anything special for this exam.            Mar 27, 2017  4:00 PM CDT   Return Visit with Aubrey Boyd MD   [facility]    [address] [address] [place] [address] [phone]              Who to contact     If you have questions or need follow up information about today's clinic visit or your schedule please contact [facility] directly at [phone].  Normal or non-critical lab and imaging results will be communicated to you by MyChart, letter or phone within 4 business days after the clinic has received the results. If you do not hear from us within 7 days, please contact the clinic through MyChart or phone. If you have a critical or abnormal lab result, we will notify you by phone as soon as possible.  Submit refill requests through [employer] or call your pharmacy and they will forward the refill request to us. Please allow 3 business days for your refill to be completed.          Additional  Information About Your Visit        [employer] Information     [employer] gives you secure access to your electronic health record. If you see a primary care provider, you can also send messages to your care team and make appointments. If you have questions, please call your primary care clinic.  If you do not have a primary care provider, please call [phone] and they will assist you.      [employer] is an electronic gateway that provides easy, online access to your medical records. With [employer], you can request a clinic appointment, read your test results, renew a prescription or communicate with your care team.     To access your existing account, please contact your [facility] or call [phone] for assistance.        Care EveryWhere ID     This is your Care EveryWhere ID. This could be used by other organizations to access your [place] medical records  [id number]        Your Vitals Were     Last Period                   12/06/2016 (Exact Date)            Blood Pressure from Last 3 Encounters:   02/22/17 123/79   02/14/17 125/84   02/13/17 129/83    Weight from Last 3 Encounters:   02/22/17 90.7 kg (200 lb)   02/14/17 92.8 kg (204 lb 9.4 oz)   01/31/17 91.6 kg (202 lb)              We Performed the Following     INSERT BLADDER CATH, TEMP INDWELL SIMPLE (RODRIGUEZ)          Today's Medication Changes          These changes are accurate as of: 3/6/17  4:22 PM.  If you have any questions, ask your nurse or doctor.               These medicines have changed or have updated prescriptions.        Dose/Directions    * ciprofloxacin 500 MG tablet   Commonly known as:  CIPRO   This may have changed:  You were already taking a medication with the same name, and this prescription was added. Make sure you understand how and when to take each.   Used for:  Urinary catheter (Rodriguez) change required        Dose:  500 mg   Take 1 tablet (500 mg) by mouth once for 1 dose   Quantity:  1 tablet   Refills:   0       * ciprofloxacin 500 MG tablet   Commonly known as:  CIPRO   This may have changed:  Another medication with the same name was added. Make sure you understand how and when to take each.   Used for:  VVF (vesicovaginal fistula)        Dose:  500 mg   Start taking on:  3/29/2017   Take 1 tablet (500 mg) by mouth 2 times daily for 1 day Take on day of cystogram in morning and evening.   Quantity:  2 tablet   Refills:  0       * Notice:  This list has 2 medication(s) that are the same as other medications prescribed for you. Read the directions carefully, and ask your doctor or other care provider to review them with you.         Where to get your medicines      Some of these will need a paper prescription and others can be bought over the counter.  Ask your nurse if you have questions.     You don't need a prescription for these medications     ciprofloxacin 500 MG tablet                Primary Care Provider    [facility]       No address on file        Thank you!     Thank you for choosing [facility]  for your care. Our goal is always to provide you with excellent care. Hearing back from our patients is one way we can continue to improve our services. Please take a few minutes to complete the written survey that you may receive in the mail after your visit with us. Thank you!             Your Updated Medication List - Protect others around you: Learn how to safely use, store and throw away your medicines at www.disposemymeds.org.          This list is accurate as of: 3/6/17  4:22 PM.  Always use your most recent med list.                   Brand Name Dispense Instructions for use    albuterol 108 (90 BASE) MCG/ACT Inhaler   Generic drug:  albuterol      Inhale 1-2 puffs into the lungs every 4 hours as needed       budesonide-formoterol 160-4.5 MCG/ACT Inhaler    SYMBICORT     Inhale 1-2 puffs into the lungs 2 times daily       * ciprofloxacin 500 MG tablet    CIPRO     1 tablet    Take 1 tablet (500 mg) by mouth once for 1 dose       * ciprofloxacin 500 MG tablet   Start taking on:  3/29/2017    CIPRO    2 tablet    Take 1 tablet (500 mg) by mouth 2 times daily for 1 day Take on day of cystogram in morning and evening.       opium-belladonna 30-16.2 MG per suppository    B&O SUPPRETTES    6 suppository    Place 1 suppository rectally every 8 hours as needed for moderate pain or bladder spasms       oxyCODONE 5 MG IR tablet    ROXICODONE    30 tablet    Take 1-2 tablets (5-10 mg) by mouth every 3 hours as needed for moderate to severe pain       SINGULAIR PO      Take 10 mg by mouth At Bedtime       tolterodine 4 MG 24 hr capsule    DETROL LA    90 capsule    Take 1 capsule (4 mg) by mouth daily       * Notice:  This list has 2 medication(s) that are the same as other medications prescribed for you. Read the directions carefully, and ask your doctor or other care provider to review them with you.

## 2017-03-06 NOTE — NURSING NOTE
"Crista Ma's goals for this visit include:   Chief Complaint   Patient presents with     Surgical Followup     She requests these members of her care team be copied on today's visit information: YES - PCP    Initial LMP 12/06/2016 (Exact Date) Estimated body mass index is 30.41 kg/(m^2) as calculated from the following:    Height as of 2/22/17: 1.727 m (5' 8\").    Weight as of 2/22/17: 90.7 kg (200 lb).  BP completed using cuff size: NA (Not Taken)    "

## 2017-03-06 NOTE — PROGRESS NOTES
Reason for Visit: f/u on leakage around catheter.     Clinical Data: Ms. Ma is a 45 y/o female with hx of VVF during a hx and cystotomy. She underwent a robot assisted VVF repair and midurethral sling on 2/14/17.  The leakage that she was experiencing has improved quite a bit.  It may have been peritoneal fluid.  Her constipation is much improved as well, as is her pain.    Will change her catheter today.    A/P: 45 y/o female with stress urinary incontinence as well as VVF with some vaginal drainage much improved in the last 2 weeks.    -change 18 fr catheter today  -f/u in 3 weeks for cystogram.    Aubrey Boyd MD

## 2017-03-27 ENCOUNTER — RADIANT APPOINTMENT (OUTPATIENT)
Dept: GENERAL RADIOLOGY | Facility: CLINIC | Age: 47
End: 2017-03-27
Attending: UROLOGY
Payer: COMMERCIAL

## 2017-03-27 ENCOUNTER — OFFICE VISIT (OUTPATIENT)
Dept: UROLOGY | Facility: CLINIC | Age: 47
End: 2017-03-27
Payer: COMMERCIAL

## 2017-03-27 VITALS — SYSTOLIC BLOOD PRESSURE: 120 MMHG | DIASTOLIC BLOOD PRESSURE: 75 MMHG | HEART RATE: 76 BPM

## 2017-03-27 DIAGNOSIS — Z29.89 NEED FOR PROPHYLAXIS AGAINST URINARY TRACT INFECTION: ICD-10-CM

## 2017-03-27 DIAGNOSIS — N82.0 VVF (VESICOVAGINAL FISTULA): ICD-10-CM

## 2017-03-27 DIAGNOSIS — N82.0 VVF (VESICOVAGINAL FISTULA): Primary | ICD-10-CM

## 2017-03-27 PROCEDURE — 51600 INJECTION FOR BLADDER X-RAY: CPT

## 2017-03-27 PROCEDURE — 74430 CONTRAST X-RAY BLADDER: CPT

## 2017-03-27 PROCEDURE — 99024 POSTOP FOLLOW-UP VISIT: CPT | Performed by: UROLOGY

## 2017-03-27 RX ORDER — SULFAMETHOXAZOLE AND TRIMETHOPRIM 400; 80 MG/1; MG/1
1 TABLET ORAL AT BEDTIME
Qty: 90 TABLET | Refills: 0 | COMMUNITY
Start: 2017-03-27 | End: 2017-05-01

## 2017-03-27 ASSESSMENT — PAIN SCALES - GENERAL: PAINLEVEL: MODERATE PAIN (4)

## 2017-03-27 NOTE — PROGRESS NOTES
Reason for Visit: f/u on cystogram     Clinical Data: Ms. Ma is a 47 y/o female with hx of VVF during a hx and cystotomy. She underwent a robot assisted VVF repair and midurethral sling on 2/14/17.  The leakage that she was experiencing has improved quite a bit.  It may have been peritoneal fluid.  Her constipation is much improved as well, as is her pain.  She presents today with a cystogram which shows no leakage and she is very pleased.    Trial of void today since she had a midurethral sling.    A/P: 47 y/o female with stress urinary incontinence as well as VVF with negative cystogram  -continue anticholinergic for now.  -f/u in 3 months to reassess.    Thank you for allowing me to participate in the care of  Ms. Crista Ma and I will keep you updated on her progress.    Aubrey Boyd MD

## 2017-03-27 NOTE — MR AVS SNAPSHOT
After Visit Summary   3/27/2017    Crista Ma    MRN: [id number]           Patient Information     Date Of Birth          1970        Visit Information        Provider Department      3/27/2017 4:00 PM Aubrey Boyd MD [facility]        Today's Diagnoses     VVF (vesicovaginal fistula)    -  1    Need for prophylaxis against urinary tract infection           Follow-ups after your visit        Follow-up notes from your care team     Return in about 3 months (around 6/27/2017).      Your next 10 appointments already scheduled     Jun 26, 2017  4:30 PM CDT   Return Visit with Aubrey Boyd MD   [facility]    [address] [place] [id number] [phone]              Who to contact     If you have questions or need follow up information about today's clinic visit or your schedule please contact [facility] directly at [phone].  Normal or non-critical lab and imaging results will be communicated to you by ZinkoTekhart, letter or phone within 4 business days after the clinic has received the results. If you do not hear from us within 7 days, please contact the clinic through Codeanywheret or phone. If you have a critical or abnormal lab result, we will notify you by phone as soon as possible.  Submit refill requests through Meta or call your pharmacy and they will forward the refill request to us. Please allow 3 business days for your refill to be completed.          Additional Information About Your Visit        ZinkoTekhart Information     Meta gives you secure access to your electronic health record. If you see a primary care provider, you can also send messages to your care team and make appointments. If you have questions, please call your primary care clinic.  If you do not have a primary care provider, please call [phone] and they will assist you.      Meta is an electronic gateway  that provides easy, online access to your medical records. With Worlize, you can request a clinic appointment, read your test results, renew a prescription or communicate with your care team.     To access your existing account, please contact your [facility] or call [phone] for assistance.        Care EveryWhere ID     This is your Care EveryWhere ID. This could be used by other organizations to access your [place] medical records  [id number]        Your Vitals Were     Pulse Last Period                76 12/06/2016 (Exact Date)           Blood Pressure from Last 3 Encounters:   03/27/17 120/75   02/22/17 123/79   02/14/17 125/84    Weight from Last 3 Encounters:   02/22/17 90.7 kg (200 lb)   02/14/17 92.8 kg (204 lb 9.4 oz)   01/31/17 91.6 kg (202 lb)              Today, you had the following     No orders found for display       Primary Care Provider    [facility]       No address on file        Thank you!     Thank you for choosing [facility]  for your care. Our goal is always to provide you with excellent care. Hearing back from our patients is one way we can continue to improve our services. Please take a few minutes to complete the written survey that you may receive in the mail after your visit with us. Thank you!             Your Updated Medication List - Protect others around you: Learn how to safely use, store and throw away your medicines at www.disposemymeds.org.          This list is accurate as of: 3/27/17  4:26 PM.  Always use your most recent med list.                   Brand Name Dispense Instructions for use    albuterol 108 (90 BASE) MCG/ACT Inhaler   Generic drug:  albuterol      Inhale 1-2 puffs into the lungs every 4 hours as needed       budesonide-formoterol 160-4.5 MCG/ACT Inhaler    SYMBICORT     Inhale 1-2 puffs into the lungs 2 times daily       ciprofloxacin 500 MG tablet   Start taking on:  3/29/2017     CIPRO    2 tablet    Take 1 tablet (500 mg) by mouth 2 times daily for 1 day Take on day of cystogram in morning and evening.       opium-belladonna 30-16.2 MG per suppository    B&O SUPPRETTES    6 suppository    Place 1 suppository rectally every 8 hours as needed for moderate pain or bladder spasms       oxyCODONE 5 MG IR tablet    ROXICODONE    30 tablet    Take 1-2 tablets (5-10 mg) by mouth every 3 hours as needed for moderate to severe pain       SINGULAIR PO      Take 10 mg by mouth At Bedtime       sulfamethoxazole-trimethoprim 400-80 MG per tablet    BACTRIM/SEPTRA    90 tablet    Take 1 tablet by mouth At Bedtime       tolterodine 4 MG 24 hr capsule    DETROL LA    90 capsule    Take 1 capsule (4 mg) by mouth daily

## 2017-03-27 NOTE — NURSING NOTE
Trial of void performed as ordered by Dr. Boyd. Instilled 50 ml of normal saline via neri catheter. Patient expressed fullness and urgency.  8cc balloon deflated, catheter removed with out difficulty. Patient voided 50 ml. Post void residual bladder scan was 0 ml.  Patient tolerated procedure without difficulty. Results verbally reported to Dr. Boyd. Patient okay to be sent home without catheter.  Teaching done with patient verbally as to where to go or call if unable to urinate post-catheter removal.    Mihaela Calderon, CMA

## 2017-03-27 NOTE — NURSING NOTE
"Crista Ma's goals for this visit include:   Chief Complaint   Patient presents with     RECHECK     She requests these members of her care team be copied on today's visit information: YES - PCP    Initial /75 (BP Location: Right arm, Patient Position: Chair, Cuff Size: Adult Large)  Pulse 76  LMP 12/06/2016 (Exact Date) Estimated body mass index is 30.41 kg/(m^2) as calculated from the following:    Height as of 2/22/17: 1.727 m (5' 8\").    Weight as of 2/22/17: 90.7 kg (200 lb).  BP completed using cuff size: large      "

## 2017-04-24 DIAGNOSIS — N39.0 URINARY TRACT INFECTION: Primary | ICD-10-CM

## 2017-04-25 DIAGNOSIS — N39.0 URINARY TRACT INFECTION: ICD-10-CM

## 2017-04-25 LAB
ALBUMIN UR-MCNC: NEGATIVE MG/DL
APPEARANCE UR: CLEAR
BACTERIA #/AREA URNS HPF: ABNORMAL /HPF
BILIRUB UR QL STRIP: NEGATIVE
COLOR UR AUTO: ABNORMAL
GLUCOSE UR STRIP-MCNC: NEGATIVE MG/DL
HGB UR QL STRIP: NEGATIVE
KETONES UR STRIP-MCNC: NEGATIVE MG/DL
LEUKOCYTE ESTERASE UR QL STRIP: ABNORMAL
NITRATE UR QL: NEGATIVE
NON-SQ EPI CELLS #/AREA URNS LPF: ABNORMAL /LPF
PH UR STRIP: 6.5 PH (ref 5–7)
RBC #/AREA URNS AUTO: ABNORMAL /HPF (ref 0–2)
SP GR UR STRIP: 1.01 (ref 1–1.03)
URN SPEC COLLECT METH UR: ABNORMAL
UROBILINOGEN UR STRIP-MCNC: NORMAL MG/DL (ref 0–2)
WBC #/AREA URNS AUTO: ABNORMAL /HPF (ref 0–2)

## 2017-04-25 PROCEDURE — 87086 URINE CULTURE/COLONY COUNT: CPT | Performed by: UROLOGY

## 2017-04-25 PROCEDURE — 81001 URINALYSIS AUTO W/SCOPE: CPT | Performed by: UROLOGY

## 2017-04-26 LAB
BACTERIA SPEC CULT: NO GROWTH
MICRO REPORT STATUS: NORMAL
SPECIMEN SOURCE: NORMAL

## 2017-05-01 ENCOUNTER — OFFICE VISIT (OUTPATIENT)
Dept: UROLOGY | Facility: CLINIC | Age: 47
End: 2017-05-01
Payer: COMMERCIAL

## 2017-05-01 VITALS
TEMPERATURE: 97.5 F | DIASTOLIC BLOOD PRESSURE: 85 MMHG | HEART RATE: 91 BPM | SYSTOLIC BLOOD PRESSURE: 134 MMHG | OXYGEN SATURATION: 99 %

## 2017-05-01 DIAGNOSIS — N39.41 URGENCY INCONTINENCE: Primary | ICD-10-CM

## 2017-05-01 PROBLEM — N39.3 FEMALE STRESS INCONTINENCE: Status: ACTIVE | Noted: 2017-05-01

## 2017-05-01 LAB
ALBUMIN UR-MCNC: 10 MG/DL
APPEARANCE UR: ABNORMAL
BACTERIA #/AREA URNS HPF: ABNORMAL /HPF
BILIRUB UR QL STRIP: NEGATIVE
COLOR UR AUTO: YELLOW
GLUCOSE UR STRIP-MCNC: NEGATIVE MG/DL
HGB UR QL STRIP: NEGATIVE
KETONES UR STRIP-MCNC: NEGATIVE MG/DL
LEUKOCYTE ESTERASE UR QL STRIP: ABNORMAL
NITRATE UR QL: NEGATIVE
NON-SQ EPI CELLS #/AREA URNS LPF: ABNORMAL /LPF
PH UR STRIP: 6.5 PH (ref 5–7)
RBC #/AREA URNS AUTO: ABNORMAL /HPF (ref 0–2)
SP GR UR STRIP: 1.02 (ref 1–1.03)
URN SPEC COLLECT METH UR: ABNORMAL
UROBILINOGEN UR STRIP-MCNC: 2 MG/DL (ref 0–2)
WBC #/AREA URNS AUTO: ABNORMAL /HPF (ref 0–2)

## 2017-05-01 PROCEDURE — 99024 POSTOP FOLLOW-UP VISIT: CPT | Performed by: UROLOGY

## 2017-05-01 PROCEDURE — 51798 US URINE CAPACITY MEASURE: CPT | Performed by: UROLOGY

## 2017-05-01 PROCEDURE — 87088 URINE BACTERIA CULTURE: CPT | Performed by: UROLOGY

## 2017-05-01 PROCEDURE — 81001 URINALYSIS AUTO W/SCOPE: CPT | Performed by: UROLOGY

## 2017-05-01 PROCEDURE — 87086 URINE CULTURE/COLONY COUNT: CPT | Performed by: UROLOGY

## 2017-05-01 RX ORDER — TOLTERODINE 4 MG/1
4 CAPSULE, EXTENDED RELEASE ORAL DAILY
Qty: 90 CAPSULE | Refills: 3 | Status: SHIPPED | OUTPATIENT
Start: 2017-05-01 | End: 2018-02-13

## 2017-05-01 ASSESSMENT — PAIN SCALES - GENERAL: PAINLEVEL: NO PAIN (0)

## 2017-05-01 NOTE — PROGRESS NOTES
Reason for Visit: f/u on urinary symptoms.     Clinical Data: Ms. Ma is a 45 y/o female with hx of VVF during a hx and cystotomy. She underwent a robot assisted VVF repair and midurethral sling on 2/14/17.  She was doing very well after a normal cystogram and catheter was removed however as soon as she stopped her anticholinergic on her own she started to have urge incontinence.      A/P: 45 y/o female with stress urinary incontinence as well as VVF now resolved with urge incontinence.  I explained to her that her bladder is recovering still and that she will likely still need the anticholinergic to her her urgency and urge incontinence.  She will resume.  -resume anticholinergic for now.  -f/u in 3 months to reassess.    Thank you for allowing me to participate in the care of  Ms. Crista Ma and I will keep you updated on her progress.    Aubrey Boyd MD

## 2017-05-01 NOTE — NURSING NOTE
"Crista Ma's goals for this visit include:   Chief Complaint   Patient presents with     RECHECK     post op concerns incontinence       She requests these members of her care team be copied on today's visit information: YES-PCP    Referring Provider:  No referring provider defined for this encounter.    Initial /85 (BP Location: Left arm, Patient Position: Chair, Cuff Size: Adult Large)  Pulse 91  Temp 97.5  F (36.4  C) (Oral)  LMP 12/06/2016 (Exact Date)  SpO2 99% Estimated body mass index is 30.41 kg/(m^2) as calculated from the following:    Height as of 2/22/17: 1.727 m (5' 8\").    Weight as of 2/22/17: 90.7 kg (200 lb).  BP completed using cuff size: large    post void residual - 0cc    "

## 2017-05-01 NOTE — MR AVS SNAPSHOT
After Visit Summary   5/1/2017    Crista Ma    MRN: [id number]           Patient Information     Date Of Birth          1970        Visit Information        Provider Department      5/1/2017 1:00 PM Aubrey Boyd MD [facility]        Today's Diagnoses     Urgency incontinence    -  1       Follow-ups after your visit        Follow-up notes from your care team     Return in about 3 months (around 8/1/2017).      Your next 10 appointments already scheduled     Jul 31, 2017  1:00 PM CDT   Return Visit with Aubrey Boyd MD   [facility] ([facility]    [address] [place] [address] [phone]              Who to contact     If you have questions or need follow up information about today's clinic visit or your schedule please contact [facility] directly at [phone].  Normal or non-critical lab and imaging results will be communicated to you by hikehart, letter or phone within 4 business days after the clinic has received the results. If you do not hear from us within 7 days, please contact the clinic through hikehart or phone. If you have a critical or abnormal lab result, we will notify you by phone as soon as possible.  Submit refill requests through OpenWhere or call your pharmacy and they will forward the refill request to us. Please allow 3 business days for your refill to be completed.          Additional Information About Your Visit        hikehart Information     OpenWhere gives you secure access to your electronic health record. If you see a primary care provider, you can also send messages to your care team and make appointments. If you have questions, please call your primary care clinic.  If you do not have a primary care provider, please call [phone] and they will assist you.      OpenWhere is an electronic gateway that provides easy, online access to your medical records. With  Valerio, you can request a clinic appointment, read your test results, renew a prescription or communicate with your care team.     To access your existing account, please contact your [facility] or call [phone] for assistance.        Care EveryWhere ID     This is your Care EveryWhere ID. This could be used by other organizations to access your [place] medical records  [id number]        Your Vitals Were     Pulse Temperature Last Period Pulse Oximetry          91 97.5  F (36.4  C) (Oral) 12/06/2016 (Exact Date) 99%         Blood Pressure from Last 3 Encounters:   05/01/17 134/85   03/27/17 120/75   02/22/17 123/79    Weight from Last 3 Encounters:   02/22/17 90.7 kg (200 lb)   02/14/17 92.8 kg (204 lb 9.4 oz)   01/31/17 91.6 kg (202 lb)              We Performed the Following     MEASURE POST-VOID RESIDUAL URINE/BLADDER CAPACITY, US NON-IMAGING     UA reflex to Microscopic and Culture     Urine Culture Aerobic Bacterial     Urine Microscopic          Today's Medication Changes          These changes are accurate as of: 5/1/17  1:53 PM.  If you have any questions, ask your nurse or doctor.               Start taking these medicines.        Dose/Directions    tolterodine 4 MG 24 hr capsule   Commonly known as:  DETROL LA   Used for:  Urgency incontinence   Started by:  Aubrey Boyd MD        Dose:  4 mg   Take 1 capsule (4 mg) by mouth daily   Quantity:  90 capsule   Refills:  3            Where to get your medicines      These medications were sent to [facility] [address] [place], [place] - [address]  [address] [place] [address]    Hours:  24-hours Phone:  [phone]     tolterodine 4 MG 24 hr capsule                Primary Care Provider    [facility]       No address on file        Thank you!     Thank you for choosing [facility]  for your care. Our goal is always to provide you  with excellent care. Hearing back from our patients is one way we can continue to improve our services. Please take a few minutes to complete the written survey that you may receive in the mail after your visit with us. Thank you!             Your Updated Medication List - Protect others around you: Learn how to safely use, store and throw away your medicines at www.disposemymeds.org.          This list is accurate as of: 5/1/17  1:53 PM.  Always use your most recent med list.                   Brand Name Dispense Instructions for use    albuterol 108 (90 BASE) MCG/ACT Inhaler   Generic drug:  albuterol      Inhale 1-2 puffs into the lungs every 4 hours as needed       budesonide-formoterol 160-4.5 MCG/ACT Inhaler    SYMBICORT     Inhale 1-2 puffs into the lungs 2 times daily       SINGULAIR PO      Take 10 mg by mouth At Bedtime       tolterodine 4 MG 24 hr capsule    DETROL LA    90 capsule    Take 1 capsule (4 mg) by mouth daily

## 2017-05-02 DIAGNOSIS — N30.00 ACUTE CYSTITIS WITHOUT HEMATURIA: Primary | ICD-10-CM

## 2017-05-02 LAB
BACTERIA SPEC CULT: ABNORMAL
MICRO REPORT STATUS: ABNORMAL
SPECIMEN SOURCE: ABNORMAL

## 2017-05-02 RX ORDER — CEPHALEXIN 500 MG/1
500 CAPSULE ORAL 4 TIMES DAILY
Qty: 28 CAPSULE | Refills: 0 | Status: SHIPPED | OUTPATIENT
Start: 2017-05-02 | End: 2017-05-09

## 2017-05-03 DIAGNOSIS — R30.0 DYSURIA: Primary | ICD-10-CM

## 2017-05-03 RX ORDER — CEPHALEXIN 500 MG/1
500 CAPSULE ORAL 4 TIMES DAILY
Qty: 28 CAPSULE | Refills: 0 | Status: SHIPPED | OUTPATIENT
Start: 2017-05-03 | End: 2017-05-10

## 2017-05-03 NOTE — TELEPHONE ENCOUNTER
Left message with request for patient to return call back to clinic. When patient returns call, will inform her of the 5/1/17 UA/UC results and Dr. Boyd's recommendation for keflex 500 mg PO QID for 7 days.     Florina Win  General Surgery - Urology RN

## 2017-05-03 NOTE — TELEPHONE ENCOUNTER
Patient returned call and is aware of results and recommendation for an antibiotic. Patient allergies verified. Keflex 500 mg PO QID for 7 days ordered per protocol. Prescription sent to [facility] in [place] per patient request. Patient will call with any questions.    Florina Win  General Surgery - Urology RN

## 2017-07-31 ENCOUNTER — OFFICE VISIT (OUTPATIENT)
Dept: UROLOGY | Facility: CLINIC | Age: 47
End: 2017-07-31
Payer: COMMERCIAL

## 2017-07-31 VITALS
DIASTOLIC BLOOD PRESSURE: 83 MMHG | TEMPERATURE: 98.4 F | OXYGEN SATURATION: 96 % | SYSTOLIC BLOOD PRESSURE: 142 MMHG | HEART RATE: 84 BPM

## 2017-07-31 DIAGNOSIS — N30.00 ACUTE CYSTITIS WITHOUT HEMATURIA: ICD-10-CM

## 2017-07-31 DIAGNOSIS — N39.3 SUI (STRESS URINARY INCONTINENCE, FEMALE): ICD-10-CM

## 2017-07-31 DIAGNOSIS — R82.90 NONSPECIFIC FINDING ON EXAMINATION OF URINE: ICD-10-CM

## 2017-07-31 DIAGNOSIS — N39.3 FEMALE STRESS INCONTINENCE: Primary | ICD-10-CM

## 2017-07-31 LAB
ALBUMIN UR-MCNC: NEGATIVE MG/DL
APPEARANCE UR: ABNORMAL
BILIRUB UR QL STRIP: NEGATIVE
COLOR UR AUTO: YELLOW
GLUCOSE UR STRIP-MCNC: NEGATIVE MG/DL
HGB UR QL STRIP: NEGATIVE
KETONES UR STRIP-MCNC: NEGATIVE MG/DL
LEUKOCYTE ESTERASE UR QL STRIP: ABNORMAL
NITRATE UR QL: NEGATIVE
NON-SQ EPI CELLS #/AREA URNS LPF: ABNORMAL /LPF
PH UR STRIP: 6 PH (ref 5–7)
RBC #/AREA URNS AUTO: ABNORMAL /HPF (ref 0–2)
SP GR UR STRIP: 1.02 (ref 1–1.03)
URN SPEC COLLECT METH UR: ABNORMAL
UROBILINOGEN UR STRIP-MCNC: 2 MG/DL (ref 0–2)
WBC #/AREA URNS AUTO: ABNORMAL /HPF (ref 0–2)

## 2017-07-31 PROCEDURE — 81001 URINALYSIS AUTO W/SCOPE: CPT | Performed by: UROLOGY

## 2017-07-31 PROCEDURE — 87086 URINE CULTURE/COLONY COUNT: CPT | Performed by: UROLOGY

## 2017-07-31 PROCEDURE — 87186 SC STD MICRODIL/AGAR DIL: CPT | Performed by: UROLOGY

## 2017-07-31 PROCEDURE — 99214 OFFICE O/P EST MOD 30 MIN: CPT | Mod: 25 | Performed by: UROLOGY

## 2017-07-31 PROCEDURE — 51798 US URINE CAPACITY MEASURE: CPT | Performed by: UROLOGY

## 2017-07-31 PROCEDURE — 87088 URINE BACTERIA CULTURE: CPT | Performed by: UROLOGY

## 2017-07-31 RX ORDER — CIPROFLOXACIN 500 MG/1
500 TABLET, FILM COATED ORAL 2 TIMES DAILY
Qty: 10 TABLET | Refills: 0 | Status: SHIPPED | OUTPATIENT
Start: 2017-07-31 | End: 2018-02-13

## 2017-07-31 ASSESSMENT — PAIN SCALES - GENERAL: PAINLEVEL: NO PAIN (0)

## 2017-07-31 NOTE — PROGRESS NOTES
Reason for Visit: f/u on urinary symptoms.     Clinical Data: Ms. Ma is a 48 y/o female with hx of VVF during a hx and cystotomy. She underwent a robot assisted VVF repair and midurethral sling on 2/14/17.  She was doing very well after a normal cystogram and catheter was removed however as soon as she stopped her anticholinergic on her own she started to have urge incontinence.  She restarted the anticholinergic and it is improved on it however she continues to have severe urgency and some urge incontinence and is having to use pads.  She notes voiding 10-20 times per day.    /83 (BP Location: Left arm, Patient Position: Chair, Cuff Size: Adult Large)  Pulse 84  Temp 98.4  F (36.9  C) (Oral)  LMP 12/06/2016 (Exact Date)  SpO2 96%    UA with small LE    A/P: 48 y/o female with stress urinary incontinence as well as VVF now resolved with urge incontinence.  She has been on anticholinergic for 3 months and has been having a lot of incontinence and urgency.  She may have  UTI today so will treat that but will also schedule her for UDS to further evaluate her bladder.    -schedule UDS and cystoscopy to review her bladder function and anatomy  -will empirically treat UTI today  -continue anticholinergics for now.    Thank you for allowing me to participate in the care of  Ms. Crista Ma and I will keep you updated on her progress.    Aubrey Boyd MD

## 2017-07-31 NOTE — MR AVS SNAPSHOT
After Visit Summary   7/31/2017    Crista Ma    MRN: [id number]           Patient Information     Date Of Birth          1970        Visit Information        Provider Department      7/31/2017 1:00 PM Aubrey Boyd MD [facility]        Today's Diagnoses     Female stress incontinence    -  1    HARRY (stress urinary incontinence, female)        Nonspecific finding on examination of urine        Acute cystitis without hematuria          Care Instructions    URODYNAMIC TESTING      Where should I go for this test?  o The procedure is performed at:     [facility]   [address] [place] [address]   Floor 4    o If you have questions about your test, please call our nurse triage line at [phone], option #2. If you need to cancel or reschedule your test for any reason, please notify us as soon as possible.    o Please check in approximately 15 minutes prior to your procedure time.        What is urodynamic testing?   o Urodynamic testing refers to a group of tests used to assess bladder function by measuring various aspects of urine storage and emptying. The test takes about 75 minutes. For most patients, the test is not painful.       How should I get ready for this test?  o Please do not drink anything for 2 hours prior to your test. If you need to take medication it is okay to have sips of water with your medicines.    o Please try to arrive with a comfortably full bladder if possible because you will be asked to try and urinate prior to your study.  o If you received a bladder diary, please complete this prior to your urodynamic test and bring it with you to your appointment. A bladder diary measures how much fluid you are drinking and how often and how much you are urinating. You can also record any urinary leakage that may have occurred and what you were doing when you leaked.    o If you have chronic  constipation, please take stool softeners for two days before your test.      What happens during the test?  o You will first be asked to empty your bladder in a private restroom into a special toilet called a uroflow machine which measures the rate of urine flow.  o A nurse will then place a very small tube (called a catheter) into your bladder. This drains any urine left over after urinating and also measures the pressures inside of your bladder during your test. Another small catheter will then be placed into your rectum to measure abdominal pressures. Two small sticky patches will be placed on the skin near your anus to measure pelvic floor function.   o We will then instill contrast dye into your bladder through the bladder catheter. The contrast is very dense and will allow us to take x-ray pictures of your bladder intermittently during your test.  o You will be asked to tell us when you first start to feel like your bladder is filling up, when you have moderate urgency to urinate, when you have very strong urgency to urinate and finally when you feel that your bladder is full. Once you feel full you will be asked to try and urinate.    o You may be asked to cough or bear down several times during your procedure. The provider running your study will be looking for urine leakage during this time.        What happens after the test?  o The provider running your urodynamic study will share the results of your test on the day of your procedure or very soon after.  o After your test, you may go about your day as normal. You may notice some blood in your urine for a couple of days which should clear up on its own. You may also feel a more urgent need to use the toilet or you may need to go more often - this is due to having a catheter placed and should resolve on its own in a few days.    Cystoscopy    What is a Cystoscopy?  This is a procedure done to check for problems inside the bladder. Problems may include  polyps (growths), tumors, inflammation (swelling and redness) and other concerns.    The doctor inserts a thin tube (called a cystoscope) into the bladder. The tube is about the size of a pencil. We will clean the area with special soap to remove bacteria and prevent post-procedure infection. We will give you numbing medicine (Lidocaine jelly) to reduce the pain or discomfort you may feel.    The tube allows the doctor to:  The doctor will be able to see inside the bladder by filling the bladder with water. The water makes it easier to see any problems that may be present.    If needed, the doctor may use the tube to:  The doctor is able to take tissue samples (biopsies). Samples are sent to the lab for testing.  The doctor can also burn off any small growths or tumors that are found. This is call fulguration.    How should I get ready for the exam?  There is no special preparation you need to do for this exam. Staff may ask for a urine sample prior to rooming you. You may eat and drink a normal diet before and after the exam. Medications may be taken as usual unless otherwise directed by the physician.    Please tell your doctor if:  You have a history of urinary tract infections.  You know that you have a tumor in your bladder.  You have bleeding problems.  You have any allergies.  You are or may be pregnant.    What happens after the exam?  You may go back to your normal diet and activity as you feel ready, unless your doctor tells you not to.    For the next two days, you may notice:  Some blood in your urine.  Some burning when you urinate (use the toilet).  An urge to urinate more often.  Bladder spasms.    These are normal after the procedure. They should go away on their own after a day or two.      You can help to relieve the above listed symptoms by:  Drinking 6 to 8 large glasses of water each day (includes drinks at meals).  This will help clear the urine.  Take warm baths to relieve pain and bladder  spasms.  Do not add anything to the bath water.  Your doctor may prescribe pain medicine.  You may also take Tylenol (acetaminophen) for pain.    When should I call my doctor?  A fever over 100.0 F (38 C) for more than a day.  (Before you call the doctor, check your temperature under your tongue.)  Chills.  Failure to urinate: No urine comes out when you try to use the toilet.  (Try soaking in a bathtub full of warm water.  If still no urine, call your doctor.)  A lot of blood in the urine or blood clots larger than a nickel.  Pain in the back or abdomen (belly / stomach area).  Pain or spasms that are not relieved by warm tub baths and pain medicine.  Severe pain, burning or other problems while passing urine.  Pain that gets worse after two days.            Follow-ups after your visit        Follow-up notes from your care team     Return for after UDS for review and cystoscopy..      Your next 10 appointments already scheduled     Aug 30, 2017  3:30 PM CDT   (Arrive by 3:15 PM)   Urodynamics with Rosita Wilkinson PA-C   [facility] ([facility])    [address] [place] [id number] [phone]            Oct 02, 2017 10:30 AM CDT   CYSTO with Aubrey Boyd MD   [facility] ([facility])    [id number] [place] [id number] [phone]              Who to contact     If you have questions or need follow up information about today's clinic visit or your schedule please contact [facility] directly at [phone].  Normal or non-critical lab and imaging results will be communicated to you by MyChart, letter or phone within 4 business days after the clinic has received the results. If you do not hear from us within 7 days, please contact the clinic through MyChart or phone. If you have a critical or abnormal lab result, we will notify you by  phone as soon as possible.  Submit refill requests through [employer] or call your pharmacy and they will forward the refill request to us. Please allow 3 business days for your refill to be completed.          Additional Information About Your Visit        [employer] Information     [employer] gives you secure access to your electronic health record. If you see a primary care provider, you can also send messages to your care team and make appointments. If you have questions, please call your primary care clinic.  If you do not have a primary care provider, please call [phone] and they will assist you.      [employer] is an electronic gateway that provides easy, online access to your medical records. With [employer], you can request a clinic appointment, read your test results, renew a prescription or communicate with your care team.     To access your existing account, please contact your [facility] or call [phone] for assistance.        Care EveryWhere ID     This is your Care EveryWhere ID. This could be used by other organizations to access your [facility] medical records  [id number]        Your Vitals Were     Pulse Temperature Last Period Pulse Oximetry          84 98.4  F (36.9  C) (Oral) 12/06/2016 (Exact Date) 96%         Blood Pressure from Last 3 Encounters:   07/31/17 142/83   05/01/17 134/85   03/27/17 120/75    Weight from Last 3 Encounters:   02/22/17 90.7 kg (200 lb)   02/14/17 92.8 kg (204 lb 9.4 oz)   01/31/17 91.6 kg (202 lb)              We Performed the Following     MEASURE POST-VOID RESIDUAL URINE/BLADDER CAPACITY, US NON-IMAGING     UA reflex to Microscopic and Culture     Urine Culture Aerobic Bacterial     Urine Microscopic          Today's Medication Changes          These changes are accurate as of: 7/31/17  1:58 PM.  If you have any questions, ask your nurse or doctor.               Start taking these medicines.        Dose/Directions    ciprofloxacin 500 MG  tablet   Commonly known as:  CIPRO   Used for:  Acute cystitis without hematuria   Started by:  Aubrey Boyd MD        Dose:  500 mg   Take 1 tablet (500 mg) by mouth 2 times daily   Quantity:  10 tablet   Refills:  0            Where to get your medicines      [id number] [place], [place] - [address] AT [address] [place] [address] & [facility]  [address] [place] [address] [place] [address]    Hours:  24-hours Phone:  [phone]     ciprofloxacin 500 MG tablet                Primary Care Provider    [facility]       No address on file        Equal Access to Services     ANA WHITT : Hadii cindy eduardo Soisaiah, waaxda lumyronadaha, qaybta kaalmaserge calderón, caroline wisdom . So [facility] [phone].    ATENCIÓN: Si habla español, tiene a dimas disposición servicios gratuitos de asistencia lingüística. Llame al [phone].    We comply with applicable federal civil rights laws and [place] laws. We do not discriminate on the basis of race, color, national origin, age, disability sex, sexual orientation or gender identity.            Thank you!     Thank you for choosing [facility]  for your care. Our goal is always to provide you with excellent care. Hearing back from our patients is one way we can continue to improve our services. Please take a few minutes to complete the written survey that you may receive in the mail after your visit with us. Thank you!             Your Updated Medication List - Protect others around you: Learn how to safely use, store and throw away your medicines at www.disposemymeds.org.          This list is accurate as of: 7/31/17  1:58 PM.  Always use your most recent med list.                   Brand Name Dispense Instructions for use Diagnosis    albuterol 108 (90 BASE) MCG/ACT Inhaler   Generic drug:  albuterol      Inhale 1-2 puffs into the lungs every 4 hours as needed    Jose-colored stools        budesonide-formoterol 160-4.5 MCG/ACT Inhaler    SYMBICORT     Inhale 1-2 puffs into the lungs 2 times daily        ciprofloxacin 500 MG tablet    CIPRO    10 tablet    Take 1 tablet (500 mg) by mouth 2 times daily    Acute cystitis without hematuria       SINGULAIR PO      Take 10 mg by mouth At Bedtime        tolterodine 4 MG 24 hr capsule    DETROL LA    90 capsule    Take 1 capsule (4 mg) by mouth daily    Urgency incontinence

## 2017-07-31 NOTE — NURSING NOTE
"Crista Ma's goals for this visit include:   Chief Complaint   Patient presents with     RECHECK     3 month f/u  Acute cystitis without hematuria        She requests these members of her care team be copied on today's visit information: yes     Referring Provider:  No referring provider defined for this encounter.    Initial /83 (BP Location: Left arm, Patient Position: Chair, Cuff Size: Adult Large)  Pulse 84  Temp 98.4  F (36.9  C) (Oral)  LMP 12/06/2016 (Exact Date)  SpO2 96% Estimated body mass index is 30.41 kg/(m^2) as calculated from the following:    Height as of 2/22/17: 1.727 m (5' 8\").    Weight as of 2/22/17: 90.7 kg (200 lb).  BP completed using cuff size: large    post void residual - 23cc    "

## 2017-07-31 NOTE — PATIENT INSTRUCTIONS
URODYNAMIC TESTING      Where should I go for this test?  o The procedure is performed at:     [facility]   [address] [place] [id number]   Floor 4    o If you have questions about your test, please call our nurse triage line at [phone], option #2. If you need to cancel or reschedule your test for any reason, please notify us as soon as possible.    o Please check in approximately 15 minutes prior to your procedure time.        What is urodynamic testing?   o Urodynamic testing refers to a group of tests used to assess bladder function by measuring various aspects of urine storage and emptying. The test takes about 75 minutes. For most patients, the test is not painful.       How should I get ready for this test?  o Please do not drink anything for 2 hours prior to your test. If you need to take medication it is okay to have sips of water with your medicines.    o Please try to arrive with a comfortably full bladder if possible because you will be asked to try and urinate prior to your study.  o If you received a bladder diary, please complete this prior to your urodynamic test and bring it with you to your appointment. A bladder diary measures how much fluid you are drinking and how often and how much you are urinating. You can also record any urinary leakage that may have occurred and what you were doing when you leaked.    o If you have chronic constipation, please take stool softeners for two days before your test.      What happens during the test?  o You will first be asked to empty your bladder in a private restroom into a special toilet called a uroflow machine which measures the rate of urine flow.  o A nurse will then place a very small tube (called a catheter) into your bladder. This drains any urine left over after urinating and also measures the pressures inside of your bladder during your test. Another small catheter will then be  placed into your rectum to measure abdominal pressures. Two small sticky patches will be placed on the skin near your anus to measure pelvic floor function.   o We will then instill contrast dye into your bladder through the bladder catheter. The contrast is very dense and will allow us to take x-ray pictures of your bladder intermittently during your test.  o You will be asked to tell us when you first start to feel like your bladder is filling up, when you have moderate urgency to urinate, when you have very strong urgency to urinate and finally when you feel that your bladder is full. Once you feel full you will be asked to try and urinate.    o You may be asked to cough or bear down several times during your procedure. The provider running your study will be looking for urine leakage during this time.        What happens after the test?  o The provider running your urodynamic study will share the results of your test on the day of your procedure or very soon after.  o After your test, you may go about your day as normal. You may notice some blood in your urine for a couple of days which should clear up on its own. You may also feel a more urgent need to use the toilet or you may need to go more often - this is due to having a catheter placed and should resolve on its own in a few days.    Cystoscopy    What is a Cystoscopy?  This is a procedure done to check for problems inside the bladder. Problems may include polyps (growths), tumors, inflammation (swelling and redness) and other concerns.    The doctor inserts a thin tube (called a cystoscope) into the bladder. The tube is about the size of a pencil. We will clean the area with special soap to remove bacteria and prevent post-procedure infection. We will give you numbing medicine (Lidocaine jelly) to reduce the pain or discomfort you may feel.    The tube allows the doctor to:  The doctor will be able to see inside the bladder by filling the bladder with  water. The water makes it easier to see any problems that may be present.    If needed, the doctor may use the tube to:  The doctor is able to take tissue samples (biopsies). Samples are sent to the lab for testing.  The doctor can also burn off any small growths or tumors that are found. This is call fulguration.    How should I get ready for the exam?  There is no special preparation you need to do for this exam. Staff may ask for a urine sample prior to rooming you. You may eat and drink a normal diet before and after the exam. Medications may be taken as usual unless otherwise directed by the physician.    Please tell your doctor if:  You have a history of urinary tract infections.  You know that you have a tumor in your bladder.  You have bleeding problems.  You have any allergies.  You are or may be pregnant.    What happens after the exam?  You may go back to your normal diet and activity as you feel ready, unless your doctor tells you not to.    For the next two days, you may notice:  Some blood in your urine.  Some burning when you urinate (use the toilet).  An urge to urinate more often.  Bladder spasms.    These are normal after the procedure. They should go away on their own after a day or two.      You can help to relieve the above listed symptoms by:  Drinking 6 to 8 large glasses of water each day (includes drinks at meals).  This will help clear the urine.  Take warm baths to relieve pain and bladder spasms.  Do not add anything to the bath water.  Your doctor may prescribe pain medicine.  You may also take Tylenol (acetaminophen) for pain.    When should I call my doctor?  A fever over 100.0 F (38 C) for more than a day.  (Before you call the doctor, check your temperature under your tongue.)  Chills.  Failure to urinate: No urine comes out when you try to use the toilet.  (Try soaking in a bathtub full of warm water.  If still no urine, call your doctor.)  A lot of blood in the urine or blood clots  larger than a nickel.  Pain in the back or abdomen (belly / stomach area).  Pain or spasms that are not relieved by warm tub baths and pain medicine.  Severe pain, burning or other problems while passing urine.  Pain that gets worse after two days.

## 2017-08-02 LAB
BACTERIA SPEC CULT: ABNORMAL
MICRO REPORT STATUS: ABNORMAL
MICROORGANISM SPEC CULT: ABNORMAL
SPECIMEN SOURCE: ABNORMAL

## 2017-08-17 ENCOUNTER — PRE VISIT (OUTPATIENT)
Dept: UROLOGY | Facility: CLINIC | Age: 47
End: 2017-08-17

## 2017-08-30 ENCOUNTER — OFFICE VISIT (OUTPATIENT)
Dept: UROLOGY | Facility: CLINIC | Age: 47
End: 2017-08-30

## 2017-08-30 VITALS
DIASTOLIC BLOOD PRESSURE: 92 MMHG | HEART RATE: 101 BPM | SYSTOLIC BLOOD PRESSURE: 137 MMHG | HEIGHT: 68 IN | BODY MASS INDEX: 32.75 KG/M2 | WEIGHT: 216.1 LBS

## 2017-08-30 DIAGNOSIS — M62.89 PELVIC FLOOR DYSFUNCTION: ICD-10-CM

## 2017-08-30 DIAGNOSIS — N39.46 MIXED INCONTINENCE: Primary | ICD-10-CM

## 2017-08-30 DIAGNOSIS — R39.15 URINARY URGENCY: ICD-10-CM

## 2017-08-30 LAB
APPEARANCE UR: CLEAR
BILIRUB UR QL: NORMAL
COLOR UR: YELLOW
GLUCOSE URINE: NORMAL MG/DL
HGB UR QL: NORMAL
KETONES UR QL: NORMAL MG/DL
LEUKOCYTE ESTERASE URINE: NORMAL
NITRITE UR QL STRIP: NORMAL
PH UR STRIP: 5 PH (ref 5–7)
PROTEIN ALBUMIN URINE: NORMAL MG/DL
SOURCE: NORMAL
SP GR UR STRIP: 1 (ref 1–1.03)
UROBILINOGEN UR QL STRIP: 0.2 EU/DL (ref 0.2–1)

## 2017-08-30 RX ORDER — MIRABEGRON 50 MG/1
50 TABLET, EXTENDED RELEASE ORAL DAILY
Qty: 30 TABLET | Refills: 5 | Status: SHIPPED | OUTPATIENT
Start: 2017-08-30 | End: 2019-12-03

## 2017-08-30 ASSESSMENT — PAIN SCALES - GENERAL: PAINLEVEL: NO PAIN (0)

## 2017-08-30 NOTE — LETTER
8/30/2017       RE: Crista Ma  [address] [facility] [address]     Dear Colleague,    Thank you for referring your patient, Crista Ma, to the [facility] PROSTATE AND UROLOGIC CANCERS at [facility]. Please see a copy of my visit note below.    PREPROCEDURE DIAGNOSES:    1. History of vesicovaginal fistula s/p robot-assisted repair on 2/14/17 with Dr. Boyd  2. History of stress urinary incontinence s/p midurethral sling on 2/14/17 with Dr. Boyd  3. Urinary urgency  4. Urinary incontinence     POSTPROCEDURE DIAGNOSES:  1. History of vesicovaginal fistula s/p robot-assisted repair on 2/14/17 with Dr. Boyd  2. History of stress urinary incontinence s/p midurethral sling on 2/14/17 with Dr. Boyd  3. Urinary frequency/urgency  4. Dysfunctional voiding  5. UDS shows:  -Good bladder compliance  -Normal bladder capacity (477 mL)  -Detrusor activity during filling: no appreciable DO/UDC's  -Detrusor activity during voiding: good detrusor contraction with voiding (max Pdet 47.4 cm H2O)  -Valsalva voiding: mild straining noted  -Flow: bell-shaped curve with some intermittency, empties to completion  -Sensation: normal  -Incontinence: no reproducible stress or urge incontinence  -Detrusor sphincter dyssenergia: some increased EMG activity during voiding; suspect this represents movement or dysfunctional voiding rather than true DSD.  -Outlet obstruction: none      PROCEDURE:    1. Uroflowmetry.  2. Sterile urethral catheterization for measurement of postvoid residual urine volume.  3. Complex filling cystometrogram with measurement of bladder and rectal pressures.  4. Complex voiding cystometrogram with measurement of bladder and rectal pressures.  5. Electromyography of the pelvic floor during urodynamics.  6. Fluoroscopic imaging of the bladder during urodynamics, at least 3 views.    7. Interpretation of urodynamics and flouroscopic imaging.       INDICATIONS FOR PROCEDURE:  Ms. Crista Ma is a pleasant 47 year old female with a history of vesicovaginal fistula s/p robot-assisted repair and midurethral sling on 2/14/17 with Dr. Boyd. Stress incontinence has improved somewhat since surgery but she now has significant urinary urgency and urge incontinence despite anticholinergic therapy. Baseline video urodynamic assessment is requested today by Dr. Boyd to better characterize Ms. Crista Ma's voiding dysfunction.      VOIDING DIARY:  Voids anywhere from 10-20 times per day, nocturia x 2-3.  Episodes of incontinence: numerous per day and night.  Incontinence associated with: strong urge, coughing, sneezing, standing, without awareness, while sleeping.  Total Volume Intake: 2250 mL of water day, 2 cups of coffee or tea in the morning  Total Volume Output: unknown    DESCRIPTION OF PROCEDURE:  Risks, benefits, and alternatives to urodynamics were discussed with the patient and she wished to proceed.  Urodynamics are planned to better assess the primary etiology for Ms. Ma's urologic dysfunction.  The patient last took anticholinergic medication 24 hours ago (Detrol LA 4 mg daily).  After informed consent was obtained, the patient was taken to the procedure room where uroflowmetry was performed. Findings below.     UROFLOWMETRY:  Voided volume: 73 mL.  Maximum flow rate: 11.1 mL/sec.  Average flow rate: 5.4 mL/sec.  Postvoid residual by catheter: 80 mL.  Character of the curve: bell-shaped curve.  Pretest urine dipstick was negative for leukocytes and nitrites.    Next a 7F double-lumen urodynamics catheter was inserted into the bladder under sterile technique.  A 7F abdominal manometry catheter was placed in the rectum.  EMG pads were placed on both sides of the anal verge.  The bladder was filled with 200 mL of Cystografin at 15 mL/minute and serial pressures were recorded.  With coughing there was an appropriate rise in vesical and abdominal  pressures with no change in detrusor pressure, confirming good study catheter placement.    DURING THE FILLING PHASE:  First sensation: 248 mL.  First Desire: 248 mL.  Strong Desire: 347 mL.  Maximum Capacity: 477 mL.    Uninhibited detrusor contractions: no appreciable DO/UDC's  Compliance: Good. PDet=6 cmH20 at capacity. Compliance ratio of 80.  Continence: no stress leak at Pabd 117 cm H2O at a volume of 413 mL.  EMG: concordant during filling.    DURING THE VOIDING PHASE:  Maximum detrusor contraction with void: 47.4 cm of H2O pressure.  Voided volume: 477 mL.  Maximum flow rate: 27.7 mL/sec.  Average flow rate: 8.8 mL/sec.  Detrusor sphincter dyssynergia: some increased EMG activity during voiding, but suspect this represents movement or dysfunctional voiding rather than true DSD.  Character of voiding curve: bell-shaped curve.  BOOI: -18.9 (suggesting no obstruction - see key below)  [obstructed (HOUSTON index [BOOI] ? 40); equivocal (no definite   obstruction; BOOI 20-40); and no obstruction (BOOI ? 20)]    FLUOROSCOPIC IMAGING OF THE BLADDER DURING URODYNAMICS:  Fluoroscopy during today's procedure demonstrated a smooth walled bladder without diverticulae or cellules.  No vesicoureteral reflux was observed.  The bladder neck was closed during filling and open during voiding.  After voiding to completion, all catheters were removed and the patient was brought back into the consultation room to further discuss today's study results.      ASSESSMENT/PLAN:  Ms. Crista Ma is a pleasant 47 year old female with a history of vesicovaginal fistula and stress urinary incontinence who is s/p robot-assisted repair of VVF and midurethral sling on 2/14/17 with Dr. Boyd, now with refractory urinary frequency/urgency and urinary incontinence who demonstrated the following findings today on urodynamic evaluation:    -Good bladder compliance  -Normal bladder capacity (477 mL)  -Detrusor activity during filling: no  appreciable DO/UDC's  -Detrusor activity during voiding: good detrusor contraction with voiding (max Pdet 47.4 cm H2O)  -Valsalva voiding: mild straining noted  -Flow: bell-shaped curve with some intermittency, empties to completion  -Sensation: normal  -Incontinence: no reproducible stress or urge incontinence  -Detrusor sphincter dyssenergia: some increased EMG activity during voiding; suspect this represents movement or dysfunctional voiding rather than true DSD.  -Outlet obstruction: none    We discussed her study results in detail today. She has good capacity and compliance with no DO or reproducible stress or urge incontinence. She empties her bladder well with mild Valsalva, questionable component of dysfunctional voiding versus movement given increased EMG activity with voiding.   -Given that she continues to be bothered by frequency/urgency despite Detrol, will switch to Myrbetriq 50 mg daily. If Detrol works better, fine to switch back to this.   -We also discussed referral to pelvic floor PT given dysfunctional voiding component. She would like to consider this. Referral placed to [facility].    The patient will follow up as scheduled on 10/2/17 with Dr. Boyd for cystoscopy and to further discuss today's study results and make plans for how best to proceed.      - A single Cipro antibiotic was provided for UTI prophylaxis following completion of today's study per department protocol.  The risk of UTI with VUDS is low at ~2.5-3%.      Thank you for allowing me to participate in the care of Ms. Crista Ma and please don't hesitate to contact me with any questions or concerns.      Rosita Wilkinson PA-C  Urology Physician Assistant

## 2017-08-30 NOTE — MR AVS SNAPSHOT
After Visit Summary   8/30/2017    Crista Ma    MRN: [id number]           Patient Information     Date Of Birth          1970        Visit Information        Provider Department      8/30/2017 3:30 PM Rosita Wilkinson PA-C [facility]        Today's Diagnoses     Female stress incontinence    -  1    Urinary urgency        Mixed incontinence          Care Instructions    UROLOGY CLINIC VISIT PATIENT INSTRUCTIONS    1) STOP taking Detrol LA 4 mg daily    2) START taking Myrbetriq (mirabegron) 50 mg daily. This was sent to [facility] [address] in [place].    3) If you try the myrbetriq for a few weeks and feel the Detrol works better, it is fine to switch back to Detrol.    4) Physical Therapy Referral    Please call [phone] to make an appointment     [facility] - [email or web address]  [facility] - [email or web address]    Locations:    [facility] - U-Ortho PT [place]   [facility] FSOC [facility] [place]   FSOC [facility] PT FSOC [facility] PT FS[facility]         Follow up as scheduled with Dr. Boyd on 10/2/2017 to further discuss today's study results.      If you have any issues, questions or concerns in the meantime, do not hesitate to contact us at [phone] or via Ixchelsis.     It was a pleasure meeting with you today.  Thank you for allowing me and my team the privilege of caring for you today.  YOU are the reason we are here, and I truly hope we provided you with the excellent  service you deserve.  Please let us know if there is anything else we can do for you so that we can be sure you are leaving completely satisfied with your care experience.                Follow-ups after your visit        Additional Services     [facility] Physical Therapy Referral       **This order will print in the [facility] Scheduling Office**    Physical Therapy available through:    *[facility] [facility]    Call one number to schedule at any of the above locations: [phone].    Your provider has referred you to: Physical Therapy at [facility] or [facility]    Indication/Reason for Referral: Women's Health  Onset of Illness: several months  Therapy Orders: Evaluate and Treat  Special Programs: None and Women's Health: Pelvic Dysfunction  Pelvic Floor Weakness: Incontinence: mixed and  Biofeedback, E-Stim/TENS/TENS Rental if deemed appropriate by therapist, Exercise/HEP and Myofascial Release/Massage (internal)  Special Request: Exercise: Home Exercise Program  Manual Therapy: Myofascial Release/Massage (internal)  Modalities: As Indicated E-Stim/TENS    Additional Comments for the Therapist : Core strengthening    Please be aware that coverage of these services is subject to the terms and limitations of your health insurance plan.  Call member services at your health plan with any benefit or coverage questions.      Please bring the following to your appointment:    *Your personal calendar for scheduling future appointments  *Comfortable clothing                  Your next 10 appointments already scheduled     Oct 02, 2017 10:30 AM CDT   CYSTO with Aubrey Boyd MD   [facility] ([facility]    [address] [address] [place] [id number] [phone]              Who to contact     Please call your clinic at [phone] to:    Ask questions about your health    Make or cancel appointments    Discuss your medicines    Learn about your test results    Speak to your doctor   If  "you have compliments or concerns about an experience at your clinic, or if you wish to file a complaint, please contact [facility] Patient Relations at [phone] or email us at [email or web address]         Additional Information About Your Visit        MyChart Information     Transcriptichart gives you secure access to your electronic health record. If you see a primary care provider, you can also send messages to your care team and make appointments. If you have questions, please call your primary care clinic.  If you do not have a primary care provider, please call [phone] and they will assist you.      Fleep is an electronic gateway that provides easy, online access to your medical records. With Fleep, you can request a clinic appointment, read your test results, renew a prescription or communicate with your care team.     To access your existing account, please contact your [facility] or call [phone] for assistance.        Care EveryWhere ID     This is your Care EveryWhere ID. This could be used by other organizations to access your [facility] medical records  [id number]        Your Vitals Were     Pulse Height Last Period BMI (Body Mass Index)          101 1.727 m (5' 8\") 12/06/2016 (Exact Date) 32.86 kg/m2         Blood Pressure from Last 3 Encounters:   08/30/17 (!) 137/92   07/31/17 142/83   05/01/17 134/85    Weight from Last 3 Encounters:   08/30/17 98 kg (216 lb 1.6 oz)   02/22/17 90.7 kg (200 lb)   02/14/17 92.8 kg (204 lb 9.4 oz)              We Performed the Following     [facility] Physical Therapy Referral     Urinalysis chemical screen POCT          Today's Medication Changes          These changes are accurate as of: 8/30/17  4:02 PM.  If you have any questions, ask your nurse or doctor.               Start taking these medicines.        Dose/Directions    mirabegron 50 MG 24 hr tablet   Commonly known as:  MYRBETRIQ   Used for: "  Urinary urgency   Started by:  Rosita Wilkinson PA-C        Dose:  50 mg   Take 1 tablet (50 mg) by mouth daily   Quantity:  30 tablet   Refills:  5            Where to get your medicines      [facility]  [place], [place] - [address] AT [address] [place] [address] & [facility]  [address] [place] [address] [place] [address]    Hours:  24-hours Phone:  [phone]     mirabegron 50 MG 24 hr tablet                Primary Care Provider    [facility]       No address on file        Equal Access to Services     [facility]: Hadii cindy elliott hadasho Soomaali, waaxda luqadaha, qaybta kaalmada kaitlynn, caroline wisdom . So [facility] [phone].    ATENCIÓN: Si habla español, tiene a dimas disposición servicios gratuitos de asistencia lingüística. [facility] [phone].    We comply with applicable federal civil rights laws and [place] laws. We do not discriminate on the basis of race, color, national origin, age, disability sex, sexual orientation or gender identity.            Thank you!     Thank you for choosing [facility]OLOGY AND [facility] FOR PROSTATE AND UROLOGIC CANCERS  for your care. Our goal is always to provide you with excellent care. Hearing back from our patients is one way we can continue to improve our services. Please take a few minutes to complete the written survey that you may receive in the mail after your visit with us. Thank you!             Your Updated Medication List - Protect others around you: Learn how to safely use, store and throw away your medicines at www.disposemymeds.org.          This list is accurate as of: 8/30/17  4:02 PM.  Always use your most recent med list.                   Brand Name Dispense Instructions for use Diagnosis    ADVAIR DISKUS 250-50 MCG/DOSE diskus inhaler   Generic drug:  fluticasone-salmeterol      Inhale 1 puff into the lungs        budesonide-formoterol 160-4.5 MCG/ACT Inhaler    SYMBICORT      Inhale 1-2 puffs into the lungs 2 times daily        ciprofloxacin 500 MG tablet    CIPRO    10 tablet    Take 1 tablet (500 mg) by mouth 2 times daily    Acute cystitis without hematuria       mirabegron 50 MG 24 hr tablet    MYRBETRIQ    30 tablet    Take 1 tablet (50 mg) by mouth daily    Urinary urgency       PROAIR  (90 BASE) MCG/ACT Inhaler   Generic drug:  albuterol      Inhale 1-2 puffs into the lungs every 4 hours as needed    Jose-colored stools       SINGULAIR PO      Take 10 mg by mouth At Bedtime        tolterodine 4 MG 24 hr capsule    DETROL LA    90 capsule    Take 1 capsule (4 mg) by mouth daily    Urgency incontinence

## 2017-08-30 NOTE — PATIENT INSTRUCTIONS
UROLOGY CLINIC VISIT PATIENT INSTRUCTIONS    1) STOP taking Detrol LA 4 mg daily    2) START taking Myrbetriq (mirabegron) 50 mg daily. This was sent to [facility] on [address] in [place].    3) If you try the myrbetriq for a few weeks and feel the Detrol works better, it is fine to switch back to Detrol.    4) Physical Therapy Referral    Please call [phone] to make an appointment     [place] [facility] - [email or web address]  [facility] - [email or web address]    Locations:    [facility] [place]   FS[facility] PT FS[facility] PT FSOC [facility]         Follow up as scheduled with Dr. Boyd on 10/2/2017 to further discuss today's study results.      If you have any issues, questions or concerns in the meantime, do not hesitate to contact us at [phone] or via Yoovi.     It was a pleasure meeting with you today.  Thank you for allowing me and my team the privilege of caring for you today.  YOU are the reason we are here, and I truly hope we provided you with the excellent service you deserve.  Please let us know if there is anything else we can do for you so that we can be sure you are leaving completely satisfied with your care experience.

## 2017-08-30 NOTE — PROGRESS NOTES
PREPROCEDURE DIAGNOSES:    1. History of vesicovaginal fistula s/p robot-assisted repair on 2/14/17 with Dr. Boyd  2. History of stress urinary incontinence s/p midurethral sling on 2/14/17 with Dr. Boyd  3. Urinary urgency  4. Urinary incontinence     POSTPROCEDURE DIAGNOSES:  1. History of vesicovaginal fistula s/p robot-assisted repair on 2/14/17 with Dr. Boyd  2. History of stress urinary incontinence s/p midurethral sling on 2/14/17 with Dr. Boyd  3. Urinary frequency/urgency  4. Dysfunctional voiding  5. UDS shows:  -Good bladder compliance  -Normal bladder capacity (477 mL)  -Detrusor activity during filling: no appreciable DO/UDC's  -Detrusor activity during voiding: good detrusor contraction with voiding (max Pdet 47.4 cm H2O)  -Valsalva voiding: mild straining noted  -Flow: bell-shaped curve with some intermittency, empties to completion  -Sensation: normal  -Incontinence: no reproducible stress or urge incontinence  -Detrusor sphincter dyssenergia: some increased EMG activity during voiding; suspect this represents movement or dysfunctional voiding rather than true DSD.  -Outlet obstruction: none      PROCEDURE:    1. Uroflowmetry.  2. Sterile urethral catheterization for measurement of postvoid residual urine volume.  3. Complex filling cystometrogram with measurement of bladder and rectal pressures.  4. Complex voiding cystometrogram with measurement of bladder and rectal pressures.  5. Electromyography of the pelvic floor during urodynamics.  6. Fluoroscopic imaging of the bladder during urodynamics, at least 3 views.    7. Interpretation of urodynamics and flouroscopic imaging.      INDICATIONS FOR PROCEDURE:  Ms. Crista Ma is a pleasant 47 year old female with a history of vesicovaginal fistula s/p robot-assisted repair and midurethral sling on 2/14/17 with Dr. Boyd. Stress incontinence has improved somewhat since surgery but she now has significant urinary urgency and urge incontinence  despite anticholinergic therapy. Baseline video urodynamic assessment is requested today by Dr. Boyd to better characterize Ms. Crista Ma's voiding dysfunction.      VOIDING DIARY:  Voids anywhere from 10-20 times per day, nocturia x 2-3.  Episodes of incontinence: numerous per day and night.  Incontinence associated with: strong urge, coughing, sneezing, standing, without awareness, while sleeping.  Total Volume Intake: 2250 mL of water day, 2 cups of coffee or tea in the morning  Total Volume Output: unknown    DESCRIPTION OF PROCEDURE:  Risks, benefits, and alternatives to urodynamics were discussed with the patient and she wished to proceed.  Urodynamics are planned to better assess the primary etiology for Ms. Ma's urologic dysfunction.  The patient last took anticholinergic medication 24 hours ago (Detrol LA 4 mg daily).  After informed consent was obtained, the patient was taken to the procedure room where uroflowmetry was performed. Findings below.     UROFLOWMETRY:  Voided volume: 73 mL.  Maximum flow rate: 11.1 mL/sec.  Average flow rate: 5.4 mL/sec.  Postvoid residual by catheter: 80 mL.  Character of the curve: bell-shaped curve.  Pretest urine dipstick was negative for leukocytes and nitrites.    Next a 7F double-lumen urodynamics catheter was inserted into the bladder under sterile technique.  A 7F abdominal manometry catheter was placed in the rectum.  EMG pads were placed on both sides of the anal verge.  The bladder was filled with 200 mL of Cystografin at 15 mL/minute and serial pressures were recorded.  With coughing there was an appropriate rise in vesical and abdominal pressures with no change in detrusor pressure, confirming good study catheter placement.    DURING THE FILLING PHASE:  First sensation: 248 mL.  First Desire: 248 mL.  Strong Desire: 347 mL.  Maximum Capacity: 477 mL.    Uninhibited detrusor contractions: no appreciable DO/UDC's  Compliance: Good. PDet=6 cmH20 at  capacity. Compliance ratio of 80.  Continence: no stress leak at Pabd 117 cm H2O at a volume of 413 mL.  EMG: concordant during filling.    DURING THE VOIDING PHASE:  Maximum detrusor contraction with void: 47.4 cm of H2O pressure.  Voided volume: 477 mL.  Maximum flow rate: 27.7 mL/sec.  Average flow rate: 8.8 mL/sec.  Detrusor sphincter dyssynergia: some increased EMG activity during voiding, but suspect this represents movement or dysfunctional voiding rather than true DSD.  Character of voiding curve: bell-shaped curve.  BOOI: -18.9 (suggesting no obstruction - see key below)  [obstructed (HOUSTON index [BOOI] ? 40); equivocal (no definite   obstruction; BOOI 20-40); and no obstruction (BOOI ? 20)]    FLUOROSCOPIC IMAGING OF THE BLADDER DURING URODYNAMICS:  Fluoroscopy during today's procedure demonstrated a smooth walled bladder without diverticulae or cellules.  No vesicoureteral reflux was observed.  The bladder neck was closed during filling and open during voiding.  After voiding to completion, all catheters were removed and the patient was brought back into the consultation room to further discuss today's study results.      ASSESSMENT/PLAN:  Ms. Crista Ma is a pleasant 47 year old female with a history of vesicovaginal fistula and stress urinary incontinence who is s/p robot-assisted repair of VVF and midurethral sling on 2/14/17 with Dr. Boyd, now with refractory urinary frequency/urgency and urinary incontinence who demonstrated the following findings today on urodynamic evaluation:    -Good bladder compliance  -Normal bladder capacity (477 mL)  -Detrusor activity during filling: no appreciable DO/UDC's  -Detrusor activity during voiding: good detrusor contraction with voiding (max Pdet 47.4 cm H2O)  -Valsalva voiding: mild straining noted  -Flow: bell-shaped curve with some intermittency, empties to completion  -Sensation: normal  -Incontinence: no reproducible stress or urge incontinence  -Detrusor  sphincter dyssenergia: some increased EMG activity during voiding; suspect this represents movement or dysfunctional voiding rather than true DSD.  -Outlet obstruction: none    We discussed her study results in detail today. She has good capacity and compliance with no DO or reproducible stress or urge incontinence. She empties her bladder well with mild Valsalva, questionable component of dysfunctional voiding versus movement given increased EMG activity with voiding.   -Given that she continues to be bothered by frequency/urgency despite Detrol, will switch to Myrbetriq 50 mg daily. If Detrol works better, fine to switch back to this.   -We also discussed referral to pelvic floor PT given dysfunctional voiding component. She would like to consider this. Referral placed to [facility].    The patient will follow up as scheduled on 10/2/17 with Dr. Boyd for cystoscopy and to further discuss today's study results and make plans for how best to proceed.      - A single Cipro antibiotic was provided for UTI prophylaxis following completion of today's study per department protocol.  The risk of UTI with VUDS is low at ~2.5-3%.      Thank you for allowing me to participate in the care of Ms. Crista Ma and please don't hesitate to contact me with any questions or concerns.      Rosita Wilkinson PA-C  Urology Physician Assistant

## 2017-10-02 ENCOUNTER — OFFICE VISIT (OUTPATIENT)
Dept: UROLOGY | Facility: CLINIC | Age: 47
End: 2017-10-02
Payer: COMMERCIAL

## 2017-10-02 VITALS
HEART RATE: 76 BPM | TEMPERATURE: 98.2 F | SYSTOLIC BLOOD PRESSURE: 146 MMHG | DIASTOLIC BLOOD PRESSURE: 85 MMHG | OXYGEN SATURATION: 97 %

## 2017-10-02 DIAGNOSIS — N39.0 RECURRENT UTI: ICD-10-CM

## 2017-10-02 DIAGNOSIS — N39.3 FEMALE STRESS INCONTINENCE: Primary | ICD-10-CM

## 2017-10-02 LAB
ALBUMIN UR-MCNC: 10 MG/DL
APPEARANCE UR: CLEAR
BACTERIA #/AREA URNS HPF: ABNORMAL /HPF
BILIRUB UR QL STRIP: NEGATIVE
COLOR UR AUTO: YELLOW
GLUCOSE UR STRIP-MCNC: NEGATIVE MG/DL
HGB UR QL STRIP: NEGATIVE
KETONES UR STRIP-MCNC: NEGATIVE MG/DL
LEUKOCYTE ESTERASE UR QL STRIP: ABNORMAL
NITRATE UR QL: NEGATIVE
NON-SQ EPI CELLS #/AREA URNS LPF: ABNORMAL /LPF
PH UR STRIP: 8.5 PH (ref 5–7)
RBC #/AREA URNS AUTO: ABNORMAL /HPF
SOURCE: ABNORMAL
SP GR UR STRIP: 1.02 (ref 1–1.03)
UROBILINOGEN UR STRIP-MCNC: NORMAL MG/DL (ref 0–2)
WBC #/AREA URNS AUTO: ABNORMAL /HPF

## 2017-10-02 PROCEDURE — 52000 CYSTOURETHROSCOPY: CPT | Performed by: UROLOGY

## 2017-10-02 PROCEDURE — 81001 URINALYSIS AUTO W/SCOPE: CPT | Performed by: UROLOGY

## 2017-10-02 RX ORDER — METHENAMINE HIPPURATE 1000 MG/1
1 TABLET ORAL 2 TIMES DAILY
Qty: 60 TABLET | Refills: 11 | Status: SHIPPED | OUTPATIENT
Start: 2017-10-02 | End: 2018-02-13 | Stop reason: ALTCHOICE

## 2017-10-02 ASSESSMENT — PAIN SCALES - GENERAL: PAINLEVEL: NO PAIN (0)

## 2017-10-02 NOTE — MR AVS SNAPSHOT
"              After Visit Summary   10/2/2017    Crista Ma    MRN: [id number]           Patient Information     Date Of Birth          1970        Visit Information        Provider Department      10/2/2017 10:30 AM Aubrey Boyd MD [facility]        Today's Diagnoses     Female stress incontinence    -  1    Recurrent UTI          Care Instructions    Physical Therapy Referral    Please call [phone] to make an appointment     [place] [facility] - [email or web address]  [facility] - [email or web address]    Locations:    [facility] U-Ortho PT [place]   [facility] FSOC [facility] [place]   FSOC [facility] PT FSOC [facility] PT FSOC [facility] [place]       AFTER YOUR CYSTOSCOPY        You have just completed a cystoscopy, or \"cysto\", which allowed your physician to learn more about your bladder (or to remove a stent placed after surgery). We suggest that you continue to avoid caffeine, fruit juice, and alcohol for the next 24 hours, however, you are encouraged to return to your normal activities.         A few things that are considered normal after your cystoscopy:     * Small amount of bleeding (or spotting) that clears within the next 24 hours     * Slight burning sensation with urination     * Sensation to of needing to avoid more frequently     * The feeling of \"air\" in your urine     * Mild discomfort that is relieved with Tylenol        Please contact our office promptly if you:     * Develop a fever above 101 degrees     * Are unable to " urinate     * Develop bright red blood that does not stop     * Severe pain or swelling         Please contact our office with any concerns or questions @[facility].          Follow-ups after your visit        Follow-up notes from your care team     Return in 12 months (on 10/2/2018).      Your next 10 appointments already scheduled     Jan 11, 2018 12:00 PM CST   Return Visit with Aubrey Boyd MD   [facility]    [address] [place] [id number] [phone]              Who to contact     If you have questions or need follow up information about today's clinic visit or your schedule please contact [facility] directly at [phone].  Normal or non-critical lab and imaging results will be communicated to you by [email or web address], letter or phone within 4 business days after the clinic has received the results. If you do not hear from us within 7 days, please contact the clinic through [email or web address] or phone. If you have a critical or abnormal lab result, we will notify you by phone as soon as possible.  Submit refill requests through SueEasy or call your pharmacy and they will forward the refill request to us. Please allow 3 business days for your refill to be completed.          Additional Information About Your Visit        SueEasy Information     SueEasy gives you secure access to your electronic health record. If you see a primary care provider, you can also send messages to your care team and make appointments. If you have questions, please call your primary care clinic.  If you do not have a primary care provider, please call [phone] and they will assist you.      SueEasy is an electronic gateway that provides easy, online access to your medical records. With SueEasy, you can request a clinic appointment, read your test results, renew a prescription or communicate with your care team.     To access your existing account, please contact  your [facility] or call [phone] for assistance.        Care EveryWhere ID     This is your Care EveryWhere ID. This could be used by other organizations to access your [place] medical records  [id number]        Your Vitals Were     Pulse Temperature Last Period Pulse Oximetry          76 98.2  F (36.8  C) (Oral) 12/06/2016 (Exact Date) 97%         Blood Pressure from Last 3 Encounters:   10/02/17 146/85   08/30/17 (!) 137/92   07/31/17 142/83    Weight from Last 3 Encounters:   08/30/17 98 kg (216 lb 1.6 oz)   02/22/17 90.7 kg (200 lb)   02/14/17 92.8 kg (204 lb 9.4 oz)              We Performed the Following     CYSTOURETHROSCOPY     UA reflex to Microscopic and Culture     Urine Microscopic          Today's Medication Changes          These changes are accurate as of: 10/2/17 11:17 AM.  If you have any questions, ask your nurse or doctor.               Start taking these medicines.        Dose/Directions    methenamine hippurate 1 G Tabs tablet   Commonly known as:  HIPREX   Used for:  Recurrent UTI   Started by:  Aubrey Boyd MD        Dose:  1 g   Take 1 tablet (1 g) by mouth 2 times daily   Quantity:  60 tablet   Refills:  11            Where to get your medicines      These medications were sent to [facility] [address] [place] [address] [address] [place] [id number]    Hours:  24-hours Phone:  [phone]     methenamine hippurate 1 G Tabs tablet                Primary Care Provider Office Phone # Fax #    [facility] [phone]       [address]  [facility] [address]        Equal Access to Services     ANA WHITT AH: Tien Leung, walianeda luqadaha, qaybta kaalmada adeegyada, caroline jade. So [facility] [phone].    ATENCIÓN: Si habla español, tiene a dimas disposición servicios gratuitos de asistencia lingüística. Llame al  [phone].    We comply with applicable federal civil rights laws and [place] laws. We do not discriminate on the basis of race, color, national origin, age, disability, sex, sexual orientation, or gender identity.            Thank you!     Thank you for choosing [facility]  for your care. Our goal is always to provide you with excellent care. Hearing back from our patients is one way we can continue to improve our services. Please take a few minutes to complete the written survey that you may receive in the mail after your visit with us. Thank you!             Your Updated Medication List - Protect others around you: Learn how to safely use, store and throw away your medicines at www.disposemymeds.org.          This list is accurate as of: 10/2/17 11:17 AM.  Always use your most recent med list.                   Brand Name Dispense Instructions for use Diagnosis    ADVAIR DISKUS 250-50 MCG/DOSE diskus inhaler   Generic drug:  fluticasone-salmeterol      Inhale 1 puff into the lungs        budesonide-formoterol 160-4.5 MCG/ACT Inhaler    SYMBICORT     Inhale 1-2 puffs into the lungs 2 times daily        ciprofloxacin 500 MG tablet    CIPRO    10 tablet    Take 1 tablet (500 mg) by mouth 2 times daily    Acute cystitis without hematuria       methenamine hippurate 1 G Tabs tablet    HIPREX    60 tablet    Take 1 tablet (1 g) by mouth 2 times daily    Recurrent UTI       mirabegron 50 MG 24 hr tablet    MYRBETRIQ    30 tablet    Take 1 tablet (50 mg) by mouth daily    Urinary urgency       PROAIR  (90 BASE) MCG/ACT Inhaler   Generic drug:  albuterol      Inhale 1-2 puffs into the lungs every 4 hours as needed    Jose-colored stools       SINGULAIR PO      Take 10 mg by mouth At Bedtime        tolterodine 4 MG 24 hr capsule    DETROL LA    90 capsule    Take 1 capsule (4 mg) by mouth daily    Urgency incontinence

## 2017-10-02 NOTE — PATIENT INSTRUCTIONS
"Physical Therapy Referral    Please call [phone] to make an appointment     [place] [facility] - [email or web address]  [facility] - [email or web address]    Locations:    [facility] U-Ortho PT [place]   [facility] FS[facility] [place]   FSOC [facility] [place] [facility] [place]       AFTER YOUR CYSTOSCOPY        You have just completed a cystoscopy, or \"cysto\", which allowed your physician to learn more about your bladder (or to remove a stent placed after surgery). We suggest that you continue to avoid caffeine, fruit juice, and alcohol for the next 24 hours, however, you are encouraged to return to your normal activities.         A few things that are considered normal after your cystoscopy:     * Small amount of bleeding (or spotting) that clears within the next 24 hours     * Slight burning sensation with urination     * Sensation to of needing to avoid more frequently     * The feeling of \"air\" in your urine     * Mild discomfort that is relieved with Tylenol        Please contact our office promptly if you:     * Develop a fever above 101 degrees     * Are unable to urinate     * Develop bright red blood that does not stop     * Severe pain or swelling         Please contact our office with any concerns or questions @[email or web address].  "

## 2017-10-02 NOTE — PROGRESS NOTES
Reason for Visit:  Cystoscopy    Clinical Data: Ms. Crista Ma is a 47 year old female with a hx of vesicovaginal fistula s/p robot-assisted repair and midurethral sling on 2/14/17.  She has been having some urinary urgency and urge incontinence despite being on Detrol.    She underwent UDS on 8/30/17:    VOIDING DIARY:  Voids anywhere from 10-20 times per day, nocturia x 2-3.  Episodes of incontinence: numerous per day and night.  Incontinence associated with: strong urge, coughing, sneezing, standing, without awareness, while sleeping.  Total Volume Intake: 2250 mL of water day, 2 cups of coffee or tea in the morning  Total Volume Output: unknown    UROFLOWMETRY:  Voided volume: 73 mL.  Maximum flow rate: 11.1 mL/sec.  Average flow rate: 5.4 mL/sec.  Postvoid residual by catheter: 80 mL.  Character of the curve: bell-shaped curve.    DURING THE FILLING PHASE:  First sensation: 248 mL.  First Desire: 248 mL.  Strong Desire: 347 mL.  Maximum Capacity: 477 mL.     Uninhibited detrusor contractions: no appreciable DO/UDC's  Compliance: Good. PDet=6 cmH20 at capacity. Compliance ratio of 80.  Continence: no stress leak at Pabd 117 cm H2O at a volume of 413 mL.  EMG: concordant during filling.     DURING THE VOIDING PHASE:  Maximum detrusor contraction with void: 47.4 cm of H2O pressure.  Voided volume: 477 mL.  Maximum flow rate: 27.7 mL/sec.  Average flow rate: 8.8 mL/sec.  Detrusor sphincter dyssynergia: some increased EMG activity during voiding, but suspect this represents movement or dysfunctional voiding rather than true DSD.  Character of voiding curve: bell-shaped curve.  BOOI: -18.9 (suggesting no obstruction - see key below)  [obstructed (HOUSTON index [BOOI] ? 40); equivocal (no definite   obstruction; BOOI 20-40); and no obstruction (BOOI ? 20)]     FLUOROSCOPIC IMAGING OF THE BLADDER DURING URODYNAMICS:  Fluoroscopy during today's procedure demonstrated a smooth walled bladder without diverticulae or  cellules.  No vesicoureteral reflux was observed.  The bladder neck was closed during filling and open during voiding.     Cystoscopy procedure:  Pt. Was consented and placed in the lithotomy position.  She was cleaned and preparred in the usual fashion.  Lidocain gel was inserted into the urethra and given time to take effect.  A 16 fr flexible cystoscope was then inserted through the urethra and into the bladder.  The urethra was wnl.  The bladder was with 1+ trabeculation.  No tumors, diverticulae, or stones.  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  Bilateral u/o's were effluxing clear urine.  The cystoscope was then withdrawn.  The pt. Tolerated the procedure well.    A/P:  47 year old female s/p VVF repair with some urgency and incontinence.  Her UDS results were essentially normal with a good bladder capacity as well as compliance and inability to demonstrate bladder overactivity or incontinence despite volumes close to 500 cc.  She was on Detrol.  This was changed to Myrbetriq 50 mg at the end of August.  She has noticed some improvements.  We discussed some Pelvic floor PT.  We also discussed periurethral bulking.  She also has been getting a lot recurrent uti's and so we discussed antibiotics vs. Methenamine.  -methenamine with vit C  -pelvic floor PT  -f/u in 3 months.    Thank you for allowing me to participate in the care of  Ms. Crista Ma and I will keep you updated on her progress.    Aubrey Boyd MD

## 2017-10-02 NOTE — NURSING NOTE
"Crista Ma's goals for this visit include:   Chief Complaint   Patient presents with     Procedure     cystoscopy        She requests these members of her care team be copied on today's visit information: no    Referring Provider:  No referring provider defined for this encounter.    Initial /85 (BP Location: Right arm, Patient Position: Chair, Cuff Size: Adult Large)  Pulse 76  Temp 98.2  F (36.8  C) (Oral)  LMP 12/06/2016 (Exact Date)  SpO2 97% Estimated body mass index is 32.86 kg/(m^2) as calculated from the following:    Height as of 8/30/17: 1.727 m (5' 8\").    Weight as of 8/30/17: 98 kg (216 lb 1.6 oz).  BP completed using cuff size: large  "

## 2017-10-16 ENCOUNTER — THERAPY VISIT (OUTPATIENT)
Dept: PHYSICAL THERAPY | Facility: CLINIC | Age: 47
End: 2017-10-16
Payer: COMMERCIAL

## 2017-10-16 DIAGNOSIS — N39.46 MIXED INCONTINENCE URGE AND STRESS (MALE)(FEMALE): ICD-10-CM

## 2017-10-16 DIAGNOSIS — M99.05 SOMATIC DYSFUNCTION OF PELVIS REGION: Primary | ICD-10-CM

## 2017-10-16 DIAGNOSIS — R39.15 URINARY URGENCY: ICD-10-CM

## 2017-10-16 PROCEDURE — 97161 PT EVAL LOW COMPLEX 20 MIN: CPT | Mod: GP | Performed by: PHYSICAL THERAPIST

## 2017-10-16 PROCEDURE — 97535 SELF CARE MNGMENT TRAINING: CPT | Mod: GP | Performed by: PHYSICAL THERAPIST

## 2017-10-16 PROCEDURE — 97110 THERAPEUTIC EXERCISES: CPT | Mod: GP | Performed by: PHYSICAL THERAPIST

## 2017-10-16 PROCEDURE — 97112 NEUROMUSCULAR REEDUCATION: CPT | Mod: GP | Performed by: PHYSICAL THERAPIST

## 2017-10-16 NOTE — PROGRESS NOTES
[facility] Initial Evaluation      Subjective:    Patient is a 47 year old female presenting with rehab pelvic hpi. The history is provided by the patient.   Crista Ma is a 47 year old female with a incontinence condition.  Condition occurred with:  After surgery.    This is a chronic condition  Reports onset of urinary incontinence following a hysterectomy on 12/27/16 due to a complication in surgery(punctured bladder and vagina causing a  Fistula).Surgeon called in urologist to attempt to correct the problem but it was not possible at that time. Patient had a catheter to allow bladder to heal until surgery on 2/14/17( robot assisted repair of vesicovaginal fistula and mid urethral sling) She then had a catheter until 4/2017. Since the catheter was removed she c/o constant urine leakage- at least 20+ times day. She currently c/o stress incontinence - using 5 pads daily and a pull on pad at night. She leaks with any movement of her body including cough, laugh,transition sit to stand and walking. She c/o bladder urgency and urge incontinence. She often reports a slow urine stream requiring straining to empty and post void dribbling. She had several UTI's following both surgeries- each cleared with medications. MD advised PT on 10/2/17 for pelvic muscle strengthening.    Patient reports pain:  Other (none).           Symptoms are exacerbated by walking, sneezing, intercourse, laughing, coughing and jumping and relieved by nothing.  Since onset symptoms are unchanged.        General health as reported by patient is good.  Pertinent medical history includes:  Asthma and overweight.  Medical allergies: none.    Current medications:  Other (allergy, inhaler and myrbetriq).  Current occupation is .  Patient is working in normal job without restrictions.          Red flags:  None as reported by the patient.                        Objective:    System                       "           Pelvic Dysfunction Evaluation:    Bladder/Pelvic Problems:    Storage Problem:  Mixed incontinence, urgency and frequency  Emptying Problem:  Postvoid dribbling and strain to void      Diagnostic Tests:      UA/Urine Sample:  Most recent 3 months prior, urine normal at MD on 10/12/17                    Flexibility:  normal      Abdominal Wall:  Abdominal wall pelvic: fair TA recruitment in supine.        Pelvic Clock Exam:  normal                External Assessment:    Skin Condition:  Normal    Bearing Down/Coughing:  Normal  Tissue Symmetry:  Normal  Introitus:  Normal  Muscle Contraction/Perineal Mobility:  Slight lift, no urogential triangle descent and substitution  Internal Assessment:    Sensory Exam:  Normal  Contraction/Grade:  Weak squeeze, 2 second hold (2)  Accessory Muscle use-Abdominals:  Yes    Accessory Muscle use-Adductors:  Yes    SEMG Biofeedback:    Equipment:  [employer] electrode placement--Perianal:  Yes  Baseline EMG PM:  2.5 mv    Peak pelvic muscle contraction:  10 mv  Sustained contraction:  <2\"    Position:  SupineAdditional History:  Delivery History:  Vaginal delivery  Number of Pregnancies: 1  Number of Live Births: 1  Caffeine Consumption:  12 oz                     General     ROS    Assessment/Plan:      Patient is a 47 year old female with pelvic complaints.    Patient has the following significant findings with corresponding treatment plan.                Diagnosis 1:  Mixed urinary incontinence, urgency Decreased strength - therapeutic exercise, therapeutic activities and home program  Impaired muscle performance - biofeedback, neuro re-education and home program  Decreased function - therapeutic activities and home program  Impaired posture - neuro re-education and home program    Therapy Evaluation Codes:   1) History comprised of:   Personal factors that impact the plan of care:      None.    Comorbidity factors that impact the plan of care are:      None.  "    Medications impacting care: None.  2) Examination of Body Systems comprised of:   Body structures and functions that impact the plan of care:      Pelvis.   Activity limitations that impact the plan of care are:      Frequency, Stress incontinence, Urgency, Urge incontinence and Urinary incontinence.  3) Clinical presentation characteristics are:   Stable/Uncomplicated.  4) Decision-Making    Low complexity using standardized patient assessment instrument and/or measureable assessment of functional outcome.  Cumulative Therapy Evaluation is: Low complexity.    Previous and current functional limitations:  (See Goal Flow Sheet for this information)    Short term and Long term goals: (See Goal Flow Sheet for this information)     Communication ability:  Patient appears to be able to clearly communicate and understand verbal and written communication and follow directions correctly.  Treatment Explanation - The following has been discussed with the patient:   RX ordered/plan of care  Anticipated outcomes  Possible risks and side effects  This patient would benefit from PT intervention to resume normal activities.   Rehab potential is good.    Frequency:  1 X week, once daily  Duration:  for 2 weeks then 2x month for 3 months  Discharge Plan:  Achieve all LTG.  Independent in home treatment program.  Reach maximal therapeutic benefit.  Patient was seen for 1 PT visit and did not FU with scheduled appointments therefore DC.  Please refer to the daily flowsheet for treatment today, total treatment time and time spent performing 1:1 timed codes.

## 2017-10-16 NOTE — MR AVS SNAPSHOT
After Visit Summary   10/16/2017    Crista Ma    MRN: [id number]           Patient Information     Date Of Birth          1970        Visit Information        Provider Department      10/16/2017 12:10 PM Kim Denney PT [facility]        Today's Diagnoses     Somatic dysfunction of pelvis region    -  1    Mixed incontinence urge and stress (male)(female)        Urinary urgency           Follow-ups after your visit        Your next 10 appointments already scheduled     Nov 06, 2017 12:10 PM CST   [facility] For [facility] Only with Kim Denney PT   [facility] [place] [facility] ([facility])    [address]  [facility] [id number] [phone]            Jan 11, 2018 12:00 PM CST   Return Visit with Aubrey Boyd MD   [facility]    [address] [address] [place] [id number] [phone]              Who to contact     If you have questions or need follow up information about today's clinic visit or your schedule please contact [facility] directly at [phone].  Normal or non-critical lab and imaging results will be communicated to you by MyChart, letter or phone within 4 business days after the clinic has received the results. If you do not hear from us within 7 days, please contact the clinic through [employer] or phone. If you have a critical or abnormal lab result, we will notify you by phone as soon as possible.  Submit refill requests through [employer] or call your pharmacy and they will forward the refill request to us. Please allow 3 business days for your refill to be completed.          Additional Information About Your Visit        [employer] Information     [employer] gives you secure access to your electronic health record. If you see a primary care provider, you can also send messages  to your care team and make appointments. If you have questions, please call your primary care clinic.  If you do not have a primary care provider, please call [phone] and they will assist you.        Care EveryWhere ID     This is your Care EveryWhere ID. This could be used by other organizations to access your [place] medical records  [id number]        Your Vitals Were     Last Period                   12/06/2016 (Exact Date)            Blood Pressure from Last 3 Encounters:   10/02/17 146/85   08/30/17 (!) 137/92   07/31/17 142/83    Weight from Last 3 Encounters:   08/30/17 98 kg (216 lb 1.6 oz)   02/22/17 90.7 kg (200 lb)   02/14/17 92.8 kg (204 lb 9.4 oz)              We Performed the Following     [facility] Inital Eval Report     Neuromuscular Re-Education     PT Eval, Low Complexity (31087)     Self Care Management Training     Therapeutic Exercises        Primary Care Provider Office Phone # Fax #    [facility] [phone]       [address]  [facility] [address]        Equal Access to Services     ANA WHITT : Hadii aad ku hadasho Soomaali, waaxda luqadaha, qaybta kaalmada adebrandiyaserge, caroline wisdom . So [facility] [phone].    ATENCIÓN: Si habla español, tiene a dimas disposición servicios gratuitos de asistencia lingüística. [facility] [phone].    We comply with applicable federal civil rights laws and [place] laws. We do not discriminate on the basis of race, color, national origin, age, disability, sex, sexual orientation, or gender identity.            Thank you!     Thank you for choosing [facility]  for your care. Our goal is always to provide you with excellent care. Hearing back from our patients is one way we can continue to improve our services. Please take a few minutes to complete the written survey that you may receive in the mail after your visit with us. Thank you!              Your Updated Medication List - Protect others around you: Learn how to safely use, store and throw away your medicines at www.disposemymeds.org.          This list is accurate as of: 10/16/17  7:19 PM.  Always use your most recent med list.                   Brand Name Dispense Instructions for use Diagnosis    ADVAIR DISKUS 250-50 MCG/DOSE diskus inhaler   Generic drug:  fluticasone-salmeterol      Inhale 1 puff into the lungs        budesonide-formoterol 160-4.5 MCG/ACT Inhaler    SYMBICORT     Inhale 1-2 puffs into the lungs 2 times daily        ciprofloxacin 500 MG tablet    CIPRO    10 tablet    Take 1 tablet (500 mg) by mouth 2 times daily    Acute cystitis without hematuria       methenamine hippurate 1 G Tabs tablet    HIPREX    60 tablet    Take 1 tablet (1 g) by mouth 2 times daily    Recurrent UTI       mirabegron 50 MG 24 hr tablet    MYRBETRIQ    30 tablet    Take 1 tablet (50 mg) by mouth daily    Urinary urgency       PROAIR  (90 BASE) MCG/ACT Inhaler   Generic drug:  albuterol      Inhale 1-2 puffs into the lungs every 4 hours as needed    Jose-colored stools       SINGULAIR PO      Take 10 mg by mouth At Bedtime        tolterodine 4 MG 24 hr capsule    DETROL LA    90 capsule    Take 1 capsule (4 mg) by mouth daily    Urgency incontinence

## 2017-12-15 PROBLEM — R39.15 URINARY URGENCY: Status: RESOLVED | Noted: 2017-10-16 | Resolved: 2017-12-15

## 2017-12-15 PROBLEM — M99.05 SOMATIC DYSFUNCTION OF PELVIS REGION: Status: RESOLVED | Noted: 2017-10-16 | Resolved: 2017-12-15

## 2017-12-15 PROBLEM — N39.46 MIXED INCONTINENCE URGE AND STRESS (MALE)(FEMALE): Status: RESOLVED | Noted: 2017-10-16 | Resolved: 2017-12-15

## 2018-01-11 ENCOUNTER — OFFICE VISIT (OUTPATIENT)
Dept: UROLOGY | Facility: CLINIC | Age: 48
End: 2018-01-11
Payer: COMMERCIAL

## 2018-01-11 VITALS — SYSTOLIC BLOOD PRESSURE: 156 MMHG | DIASTOLIC BLOOD PRESSURE: 99 MMHG | HEART RATE: 98 BPM | OXYGEN SATURATION: 98 %

## 2018-01-11 DIAGNOSIS — N39.3 FEMALE STRESS INCONTINENCE: ICD-10-CM

## 2018-01-11 DIAGNOSIS — N36.42 INTRINSIC SPHINCTER DEFICIENCY: Primary | ICD-10-CM

## 2018-01-11 DIAGNOSIS — N39.41 URGE INCONTINENCE OF URINE: ICD-10-CM

## 2018-01-11 PROCEDURE — 99213 OFFICE O/P EST LOW 20 MIN: CPT | Performed by: UROLOGY

## 2018-01-11 RX ORDER — CEFAZOLIN SODIUM 1 G/3ML
1 INJECTION, POWDER, FOR SOLUTION INTRAMUSCULAR; INTRAVENOUS SEE ADMIN INSTRUCTIONS
Status: CANCELLED | OUTPATIENT
Start: 2018-01-11

## 2018-01-11 ASSESSMENT — PAIN SCALES - GENERAL: PAINLEVEL: NO PAIN (0)

## 2018-01-11 NOTE — MR AVS SNAPSHOT
After Visit Summary   1/11/2018    Crista Ma    MRN: [id number]           Patient Information     Date Of Birth          1970        Visit Information        Provider Department      1/11/2018 12:00 PM Aubrey Boyd MD [facility]        Today's Diagnoses     Intrinsic sphincter deficiency    -  1    Urge incontinence of urine          Care Instructions    Surgery Instructions    Always follow your surgeon s instructions. If you don t, your surgery could be cancelled. Please use the following checklist.  Your surgery is on: The surgery scheduler will contact you within 1 week of your consult with the surgeon. If you do not hear from them, please call the clinic or RN Care Coordinator for your provider.    Time: Prearrival times can vary depending on location/type of surgery.  [place] - 2 hour pre-arrival  [facility] [place] - 2 hour pre-arrival  [place] - 1 hour pre-arrival    Note:  These times may change. A nurse will call you before surgery to confirm. If you have not received a call or if you have more questions, please call us on the working day before your surgery:  ? [place]: [phone] or [phone] (9am to 5:30pm)  ? [facility] [place]: [phone] (8am to 6pm)  ? [place]: [phone] (9am to 5pm)  ? [facility] [phone] (7am to 4pm)  Prior to surgery  ? Have a pre-op physical exam with your Primary Doctor within 30 days of surgery  - Ask your doctor to send all of your results to the surgery center/hospital before surgery. Your doctor also may ask you to bring the results with you on the day of surgery.  - Tell your doctor if:  - You are allergic to latex or rubber (latex and rubber gloves are often used in medical care).  - You are taking any medicines (including aspirin), vitamins, or herbal products. You may need to stop taking some medicines before surgery.  - You have any medical problems (allergies, diabetes, or heart disease, for  example).  - You have a pacemaker or an AICD (automatic implanted cardiac defibrillator). If you do, please bring the ID card with you on the day of surgery.  - People who smoke have a higher risk of infection after surgery. Ask your doctor how you can quit smoking.  - If you Primary Doctor is not within the [facility] system, you will need to have your pre-op physical faxed to us to be scanned into your chart.  - [place]: [phone] or [phone]  - [facility] ([place]): [phone]  - [facility] ([facility]): [phone]  ? Call your insurance company. Ask if you need pre-approval for your surgery. If you do not have insurance, please let us know. If you wish to speak to the , please alert the clinic staff so this can be arranged.  ? Arrange for someone to drive you home after surgery.  will need to be a responsible adult (18 years or older) that will provide transportation to and from surgery and stay in the waiting room during your surgery. You may not drive yourself or take public transportation to and from surgery.  ? Arrange for someone to stay with you for 24 hours after you go home. This person must be a responsible adult (18 years or older).  ? Call your surgeon or their nurse if there is any change in your health (cold, flu, infections, hospitalizations).  ? Do not smoke, drink alcohol, or take over-the-counter medicine for 24 hours before and after surgery.  ? If you take prescribed drugs, you may need to stop them until after the surgery.  Discuss what medications to take or not take prior to surgery with your Primary Doctor at your pre-op physical. Avoid over-the-counter blood-thinning medications such as Aspirin, Ibuprofen, vitamin E, or fish oil 7 days prior to surgery (unless otherwise directed by your Primary Doctor). Tylenol is a good alternative for mild pain relief prior to surgery.  ? Eating and drinking guidelines prior to surgery:  - Stop all solid  food consumption 8 hours prior to surgery  - You may drink clear liquids up to 2 hours prior to surgery (water, fruit juices without pulp, jello, tea/coffee without creamer, sports drinks, clear-fat free broth (bouillon or consomme), popsicles (without milk, bits of fruit, or seeds/nuts)  ? Follow instructions given for showering or bathing before surgery.    - Use 8 ounces of antiseptic surgical soap, like:  - Hibiclens, Scrub Care, or Exidine  - You can find it at your local pharmacy, clinic, or retail store. If you have trouble, ask your pharmacist to help you find the right substitute.  - Please wash with one of the above soaps twice before coming to the hospital for your surgery. This will decrease bacteria (germs) on your skin. It will also help reduce your chance of infection after surgery.  - Items you will need for showering:  - 4 newly washed washcloths  - 2 newly washed towels  - 8 ounces of one of the above soaps  - Following these instructions:  - The evening before surgery: Shower or bathe as you normally would, using your regular soap and a clean washcloth. Give special attention to places where your incision (surgical cut) or catheters will be. This includes your groin area. Rinse well. You may wash your hair with your regular shampoo. Next, wash your body with 4 ounces of the antiseptic soap. Use a clean, damp washcloth and gently clean your body (from the chin down). If your surgery involves your head, use the special soap on your head and scalp. Rinse well and dry off using a newly washed towel.  - The morning of surgery: Repeat the same process as the evening shower.  - Other suggestions:    Do not shave within 12 inches of your incision (surgical cut) area for at least 3 days before surgery. Shaving can make small cuts in the skin. This puts you at higher risk of infection.    Wear freshly washed pajamas or clothing after your evening shower.    Wear freshly washed clothes the day of  surgery.    Wash and change your bed sheets the day before surgery to have clean bed sheets after your shower and when you get home from surgery.    If you have trouble washing all areas, make sure someone helps you.    Don't use any deodorant, lotion or powder after your shower.    Women who are menstruating should wear a fresh sanitary pad to the hospital.  ? Do not wear or add deodorant, cologne, lotion, makeup, nail polish or jewelry to surgery. If you wear fake nails, please remove at least one nail before coming to surgery (an oxygen monitor needs to be placed on your finger during surgery).  ? Bring these items to the surgery center/hospital:  - Insurance card  - Money for parking and co-pays, if needed  - A list of all the medicines you take. Include vitamins, minerals, herbs, and over-the-counter drugs.  Note any drug allergies.  - A copy of your advance health care directive, if you have one. This tells us what treatment you would want--and who would make health care decisions--if you could no longer speak for yourself. You may request this form in advance or download it from [email or web address].  - A case for glasses, contact lenses, hearing aids, or dentures.  - Your inhaler or CPAP machine, if you use these at home.  ? Leave extra cash, jewelry, and other valuables at home.  When you arrive  When you get to the surgery center/hospital, you will:  ? Check in. If you are under age 18, you must be with a parent or legal guardian.  ? Sign consent forms, if you haven t already. These forms state that you know the risks and benefits of surgery. When you sign the forms, you give us permission to do the surgery. Do not sign them unless you understand what will happen during and after your surgery. If you have any questions about your surgery, ask to speak with your doctor before you sign the forms. If you don t understand the answers, ask again.  ? Receive a copy of the Patient s Bill of Rights. If you do  not receive a copy, please ask for one.  ? Change into hospital clothes. Your belongings will be placed in a bag. We will return them to you after surgery.  ? Meet with the anesthesia provider. He or she will tell you what kind of anesthesia (medicine) will be used to keep you comfortable during surgery.  Remember: it s okay to remind doctors and nurses to wash their hands before touching you.  In most cases, your surgeon will use a marker to write his or her initials on the surgery site. This ensures that the exact site is operated on.  For safety reasons, we will ask you the same questions many times. For example, we may ask your name and birth date over and over again.  Friends and family can stay with you until it s time for surgery. While you re in surgery, they will be in the waiting area. Please note that cell phones are not allowed in some patient care areas.  If you have questions about what will happen in the operating room, talk to your care team.  After surgery  We will move you to a recovery room, where we will watch you closely. If you have any pain or discomfort, tell your nurse. He or she will try to make you comfortable.  If you are staying overnight, we will move you to your hospital room after you are awake.  If you are going home, we will move you to another room. Friends and family may be able to join you. The length of time you spend in recovery depend on the type of medicine you received, your medical condition, the type of surgery you had, or your response to the anesthesia given during your procedure.  When you are discharged from the recovery room, the nurses will review instructions with you and your caregiver.  ? Please wash your hands every time you touch the wound or change bandages or dressings.  ? Do not submerge the wound in water.  You may not use a bathtub or hot tub until the wound is closed. The wait time frame is generally 2-3 weeks, but any open area can be a source of incoming  bacteria, so it is better to be on the safe side and avoid water submersion until your wound is fully healed.  ? You may take a shower 24 hours after surgery. Double check with your surgeon if it is OK for water to run over the wound, whether it has been sutured, stapled, glued, or is open. You may gently wash the wound using the antiseptic soap provided for your pre-surgery showering (do not use a washcloth). Any mild soap will work as well.  ? Many surgical wounds will have small white strips of tape on them called steri-strips.  Do not remove these. The edges will curl and fall off within 7-10 days with normal showering.  ? If you are going home with sutures (stitches) or staples, you must return to the clinic to have them taken out, usually within 1-2 weeks. Some stitches are dissolvable and do not require removal. Make sure to clarify with your surgeon or surgery nurse reviewing discharge paperwork what kind of sutures you have.  ? Signs and symptoms of infection include:  - Fever, temperature over 101.5   F  - Redness  - Swelling  - Increased pain  - Green or yellow drainage which may or may not have a foul odor  Dealing with pain  A nurse will check your comfort level often during your stay. He or she will work with you to manage your pain.  Remember:  ? All pain is real. There are many ways to control pain. We can help you decide what works best for you.  ? Ask for pain medicine when you need it. Don t try to  tough it out --this can make you feel worse. Always take your medicine as ordered.  ? Medicine doesn t work the same for everyone. If your medicine isn t working, tell your nurse. There may be other medicines or treatments we can try.  Going home  We will let you know when you re ready to leave the surgery center or hospital. Before you leave, we will tell you how to care for yourself at home and prevent infections. If you do not understand something, please say so. We will answer any questions you  have. We will then help you get ready to leave.  Remember, you must have a responsible adult (18 years or older) to stay with you 24 hours after you leave the hospital.  Take it easy when you get home. You will need some time to recover--you may be more tired than you realize at first. Rest and relax for at least the first 24 hours at home. You ll feel better and heal faster if you take good care of yourself.  Follow the discharge instructions that are given to you when you leave the surgery center or hospital  Please call the clinic if you experience any problems during regular clinic hours (Monday-Friday 8:00am-5:00pm).  If you experience problems during non-clinic hours, please call the [facility] on-call line at [phone] and ask the  to page the on-call Provider for your specialty. The on-call Provider will call you back and can triage your symptoms and further advise. If you are having an emergency, always call 911 or seek immediate evaluation at the Emergency Room.  Locations  [facility]  Same-day surgery center - 2nd floor, check-in #5  [address] [place] [address]  [phone]  [email or web address]            Follow-ups after your visit        Follow-up notes from your care team     Return for for procedure..      Who to contact     If you have questions or need follow up information about today's clinic visit or your schedule please contact [facility] directly at [phone].  Normal or non-critical lab and imaging results will be communicated to you by MyChart, letter or phone within 4 business days after the clinic has received the results. If you do not hear from us within 7 days, please contact the clinic through [facility] or phone. If you have a critical or abnormal lab result, we will notify you by phone as soon as possible.  Submit refill requests through [employer] or call your pharmacy and they will forward the  refill request to us. Please allow 3 business days for your refill to be completed.          Additional Information About Your Visit        [employer] Information     [employer] gives you secure access to your electronic health record. If you see a primary care provider, you can also send messages to your care team and make appointments. If you have questions, please call your primary care clinic.  If you do not have a primary care provider, please call [phone] and they will assist you.      [employer] is an electronic gateway that provides easy, online access to your medical records. With [employer], you can request a clinic appointment, read your test results, renew a prescription or communicate with your care team.     To access your existing account, please contact your [facility] or call [phone] for assistance.        Care EveryWhere ID     This is your Care EveryWhere ID. This could be used by other organizations to access your [place] medical records  [id number]        Your Vitals Were     Pulse Last Period Pulse Oximetry             98 12/06/2016 (Exact Date) 98%          Blood Pressure from Last 3 Encounters:   01/11/18 (!) 156/99   10/02/17 146/85   08/30/17 (!) 137/92    Weight from Last 3 Encounters:   08/30/17 98 kg (216 lb 1.6 oz)   02/22/17 90.7 kg (200 lb)   02/14/17 92.8 kg (204 lb 9.4 oz)              Today, you had the following     No orders found for display       Primary Care Provider Office Phone # Fax #    [facility] [phone]       [address]  [facility] [address]        Equal Access to Services     [facility]: Hadii aad ku hadasho Soomaali, waaxda luqadaha, qaybta kaalmada adeegyada, caroline wisdom . So [facility] [phone].    ATENCIÓN: Si habla español, tiene a dimas disposición servicios gratuitos de asistencia lingüística. Llame al [phone].    We comply with applicable federal  civil rights laws and [place] laws. We do not discriminate on the basis of race, color, national origin, age, disability, sex, sexual orientation, or gender identity.            Thank you!     Thank you for choosing [facility]  for your care. Our goal is always to provide you with excellent care. Hearing back from our patients is one way we can continue to improve our services. Please take a few minutes to complete the written survey that you may receive in the mail after your visit with us. Thank you!             Your Updated Medication List - Protect others around you: Learn how to safely use, store and throw away your medicines at www.disposemymeds.org.          This list is accurate as of: 1/11/18 12:27 PM.  Always use your most recent med list.                   Brand Name Dispense Instructions for use Diagnosis    ADVAIR DISKUS 250-50 MCG/DOSE diskus inhaler   Generic drug:  fluticasone-salmeterol      Inhale 1 puff into the lungs        budesonide-formoterol 160-4.5 MCG/ACT Inhaler    SYMBICORT     Inhale 1-2 puffs into the lungs 2 times daily        ciprofloxacin 500 MG tablet    CIPRO    10 tablet    Take 1 tablet (500 mg) by mouth 2 times daily    Acute cystitis without hematuria       methenamine hippurate 1 G Tabs tablet    HIPREX    60 tablet    Take 1 tablet (1 g) by mouth 2 times daily    Recurrent UTI       mirabegron 50 MG 24 hr tablet    MYRBETRIQ    30 tablet    Take 1 tablet (50 mg) by mouth daily    Urinary urgency       PROAIR  (90 BASE) MCG/ACT Inhaler   Generic drug:  albuterol      Inhale 1-2 puffs into the lungs every 4 hours as needed    Jose-colored stools       SINGULAIR PO      Take 10 mg by mouth At Bedtime        tolterodine 4 MG 24 hr capsule    DETROL LA    90 capsule    Take 1 capsule (4 mg) by mouth daily    Urgency incontinence

## 2018-01-11 NOTE — PATIENT INSTRUCTIONS
Surgery Instructions    Always follow your surgeon s instructions. If you don t, your surgery could be cancelled. Please use the following checklist.  Your surgery is on: The surgery scheduler will contact you within 1 week of your consult with the surgeon. If you do not hear from them, please call the clinic or RN Care Coordinator for your provider.    Time: Prearrival times can vary depending on location/type of surgery.  [place] - 2 hour pre-arrival  [facility] - 2 hour pre-arrival  [place] - 1 hour pre-arrival    Note:  These times may change. A nurse will call you before surgery to confirm. If you have not received a call or if you have more questions, please call us on the working day before your surgery:  ? [place]: [phone] or [phone] (9am to 5:30pm)  ? [facility]: [phone] (8am to 6pm)  ? [place]: [phone] (9am to 5pm)  ? [facility] [phone] (7am to 4pm)  Prior to surgery  ? Have a pre-op physical exam with your Primary Doctor within 30 days of surgery  - Ask your doctor to send all of your results to the surgery center/hospital before surgery. Your doctor also may ask you to bring the results with you on the day of surgery.  - Tell your doctor if:  - You are allergic to latex or rubber (latex and rubber gloves are often used in medical care).  - You are taking any medicines (including aspirin), vitamins, or herbal products. You may need to stop taking some medicines before surgery.  - You have any medical problems (allergies, diabetes, or heart disease, for example).  - You have a pacemaker or an AICD (automatic implanted cardiac defibrillator). If you do, please bring the ID card with you on the day of surgery.  - People who smoke have a higher risk of infection after surgery. Ask your doctor how you can quit smoking.  - If you Primary Doctor is not within the [employer] system, you will need to have your pre-op physical faxed to us to be scanned into your  chart.  - [place]: [phone] or [phone]  - [facility] ([place]): [phone]  - [facility] ([facility] [place]): [phone]  ? Call your insurance company. Ask if you need pre-approval for your surgery. If you do not have insurance, please let us know. If you wish to speak to the , please alert the clinic staff so this can be arranged.  ? Arrange for someone to drive you home after surgery.  will need to be a responsible adult (18 years or older) that will provide transportation to and from surgery and stay in the waiting room during your surgery. You may not drive yourself or take public transportation to and from surgery.  ? Arrange for someone to stay with you for 24 hours after you go home. This person must be a responsible adult (18 years or older).  ? Call your surgeon or their nurse if there is any change in your health (cold, flu, infections, hospitalizations).  ? Do not smoke, drink alcohol, or take over-the-counter medicine for 24 hours before and after surgery.  ? If you take prescribed drugs, you may need to stop them until after the surgery.  Discuss what medications to take or not take prior to surgery with your Primary Doctor at your pre-op physical. Avoid over-the-counter blood-thinning medications such as Aspirin, Ibuprofen, vitamin E, or fish oil 7 days prior to surgery (unless otherwise directed by your Primary Doctor). Tylenol is a good alternative for mild pain relief prior to surgery.  ? Eating and drinking guidelines prior to surgery:  - Stop all solid food consumption 8 hours prior to surgery  - You may drink clear liquids up to 2 hours prior to surgery (water, fruit juices without pulp, jello, tea/coffee without creamer, sports drinks, clear-fat free broth (bouillon or consomme), popsicles (without milk, bits of fruit, or seeds/nuts)  ? Follow instructions given for showering or bathing before surgery.    - Use 8 ounces of antiseptic surgical soap,  like:  - Hibiclens, Scrub Care, or Exidine  - You can find it at your local pharmacy, clinic, or retail store. If you have trouble, ask your pharmacist to help you find the right substitute.  - Please wash with one of the above soaps twice before coming to the hospital for your surgery. This will decrease bacteria (germs) on your skin. It will also help reduce your chance of infection after surgery.  - Items you will need for showering:  - 4 newly washed washcloths  - 2 newly washed towels  - 8 ounces of one of the above soaps  - Following these instructions:  - The evening before surgery: Shower or bathe as you normally would, using your regular soap and a clean washcloth. Give special attention to places where your incision (surgical cut) or catheters will be. This includes your groin area. Rinse well. You may wash your hair with your regular shampoo. Next, wash your body with 4 ounces of the antiseptic soap. Use a clean, damp washcloth and gently clean your body (from the chin down). If your surgery involves your head, use the special soap on your head and scalp. Rinse well and dry off using a newly washed towel.  - The morning of surgery: Repeat the same process as the evening shower.  - Other suggestions:    Do not shave within 12 inches of your incision (surgical cut) area for at least 3 days before surgery. Shaving can make small cuts in the skin. This puts you at higher risk of infection.    Wear freshly washed pajamas or clothing after your evening shower.    Wear freshly washed clothes the day of surgery.    Wash and change your bed sheets the day before surgery to have clean bed sheets after your shower and when you get home from surgery.    If you have trouble washing all areas, make sure someone helps you.    Don't use any deodorant, lotion or powder after your shower.    Women who are menstruating should wear a fresh sanitary pad to the hospital.  ? Do not wear or add deodorant, cologne, lotion,  makeup, nail polish or jewelry to surgery. If you wear fake nails, please remove at least one nail before coming to surgery (an oxygen monitor needs to be placed on your finger during surgery).  ? Bring these items to the surgery center/hospital:  - Insurance card  - Money for parking and co-pays, if needed  - A list of all the medicines you take. Include vitamins, minerals, herbs, and over-the-counter drugs.  Note any drug allergies.  - A copy of your advance health care directive, if you have one. This tells us what treatment you would want--and who would make health care decisions--if you could no longer speak for yourself. You may request this form in advance or download it from [email or web address].  - A case for glasses, contact lenses, hearing aids, or dentures.  - Your inhaler or CPAP machine, if you use these at home.  ? Leave extra cash, jewelry, and other valuables at home.  When you arrive  When you get to the surgery center/hospital, you will:  ? Check in. If you are under age 18, you must be with a parent or legal guardian.  ? Sign consent forms, if you haven t already. These forms state that you know the risks and benefits of surgery. When you sign the forms, you give us permission to do the surgery. Do not sign them unless you understand what will happen during and after your surgery. If you have any questions about your surgery, ask to speak with your doctor before you sign the forms. If you don t understand the answers, ask again.  ? Receive a copy of the Patient s Bill of Rights. If you do not receive a copy, please ask for one.  ? Change into hospital clothes. Your belongings will be placed in a bag. We will return them to you after surgery.  ? Meet with the anesthesia provider. He or she will tell you what kind of anesthesia (medicine) will be used to keep you comfortable during surgery.  Remember: it s okay to remind doctors and nurses to wash their hands before touching you.  In most  cases, your surgeon will use a marker to write his or her initials on the surgery site. This ensures that the exact site is operated on.  For safety reasons, we will ask you the same questions many times. For example, we may ask your name and birth date over and over again.  Friends and family can stay with you until it s time for surgery. While you re in surgery, they will be in the waiting area. Please note that cell phones are not allowed in some patient care areas.  If you have questions about what will happen in the operating room, talk to your care team.  After surgery  We will move you to a recovery room, where we will watch you closely. If you have any pain or discomfort, tell your nurse. He or she will try to make you comfortable.  If you are staying overnight, we will move you to your hospital room after you are awake.  If you are going home, we will move you to another room. Friends and family may be able to join you. The length of time you spend in recovery depend on the type of medicine you received, your medical condition, the type of surgery you had, or your response to the anesthesia given during your procedure.  When you are discharged from the recovery room, the nurses will review instructions with you and your caregiver.  ? Please wash your hands every time you touch the wound or change bandages or dressings.  ? Do not submerge the wound in water.  You may not use a bathtub or hot tub until the wound is closed. The wait time frame is generally 2-3 weeks, but any open area can be a source of incoming bacteria, so it is better to be on the safe side and avoid water submersion until your wound is fully healed.  ? You may take a shower 24 hours after surgery. Double check with your surgeon if it is OK for water to run over the wound, whether it has been sutured, stapled, glued, or is open. You may gently wash the wound using the antiseptic soap provided for your pre-surgery showering (do not use a  washcloth). Any mild soap will work as well.  ? Many surgical wounds will have small white strips of tape on them called steri-strips.  Do not remove these. The edges will curl and fall off within 7-10 days with normal showering.  ? If you are going home with sutures (stitches) or staples, you must return to the clinic to have them taken out, usually within 1-2 weeks. Some stitches are dissolvable and do not require removal. Make sure to clarify with your surgeon or surgery nurse reviewing discharge paperwork what kind of sutures you have.  ? Signs and symptoms of infection include:  - Fever, temperature over 101.5   F  - Redness  - Swelling  - Increased pain  - Green or yellow drainage which may or may not have a foul odor  Dealing with pain  A nurse will check your comfort level often during your stay. He or she will work with you to manage your pain.  Remember:  ? All pain is real. There are many ways to control pain. We can help you decide what works best for you.  ? Ask for pain medicine when you need it. Don t try to  tough it out --this can make you feel worse. Always take your medicine as ordered.  ? Medicine doesn t work the same for everyone. If your medicine isn t working, tell your nurse. There may be other medicines or treatments we can try.  Going home  We will let you know when you re ready to leave the surgery center or hospital. Before you leave, we will tell you how to care for yourself at home and prevent infections. If you do not understand something, please say so. We will answer any questions you have. We will then help you get ready to leave.  Remember, you must have a responsible adult (18 years or older) to stay with you 24 hours after you leave the hospital.  Take it easy when you get home. You will need some time to recover--you may be more tired than you realize at first. Rest and relax for at least the first 24 hours at home. You ll feel better and heal faster if you take good care of  yourself.  Follow the discharge instructions that are given to you when you leave the surgery center or hospital  Please call the clinic if you experience any problems during regular clinic hours (Monday-Friday 8:00am-5:00pm).  If you experience problems during non-clinic hours, please call the [facility] on-call line at [phone] and ask the  to page the on-call Provider for your specialty. The on-call Provider will call you back and can triage your symptoms and further advise. If you are having an emergency, always call 911 or seek immediate evaluation at the Emergency Room.  Locations  [facility]  Same-day surgery center - 2nd floor, check-in #5  [address] [place] [address]  [phone]  [email or web address]

## 2018-01-11 NOTE — PROGRESS NOTES
Reason for Visit:  F/u on urinary symptoms.    Clinical Data: Ms. Crista Ma is a 47 year old female with a hx of vesicovaginal fistula s/p robot-assisted repair and midurethral sling on 2/14/17.  She had been having some urinary urgency and urge incontinence despite being on Detrol so it was switched to Myrbetriq in August of 2017 which helped somewhat.  She was also given referral to PT in oct 2017.    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    She tried the PT but did not feel like it helped.  Her biggest problem is when she sits up from a sitting position, then she just gushes out.      UDS on 8/30/17:    VOIDING DIARY:  Voids anywhere from 10-20 times per day, nocturia x 2-3.  Episodes of incontinence: numerous per day and night.  Incontinence associated with: strong urge, coughing, sneezing, standing, without awareness, while sleeping.  Total Volume Intake: 2250 mL of water day, 2 cups of coffee or tea in the morning  Total Volume Output: unknown    UROFLOWMETRY:  Voided volume: 73 mL.  Maximum flow rate: 11.1 mL/sec.  Average flow rate: 5.4 mL/sec.  Postvoid residual by catheter: 80 mL.  Character of the curve: bell-shaped curve.    DURING THE FILLING PHASE:  First sensation: 248 mL.  First Desire: 248 mL.  Strong Desire: 347 mL.  Maximum Capacity: 477 mL.     Uninhibited detrusor contractions: no appreciable DO/UDC's  Compliance: Good. PDet=6 cmH20 at capacity. Compliance ratio of 80.  Continence: no stress leak at Pabd 117 cm H2O at a volume of 413 mL.  EMG: concordant during filling.     DURING THE VOIDING PHASE:  Maximum detrusor contraction with void: 47.4 cm of H2O pressure.  Voided volume: 477 mL.  Maximum flow rate: 27.7 mL/sec.  Average flow rate: 8.8 mL/sec.  Detrusor sphincter dyssynergia:  some increased EMG activity during voiding, but suspect this represents movement or dysfunctional voiding rather than true DSD.  Character of voiding curve: bell-shaped curve.  BOOI: -18.9 (suggesting no obstruction - see key below)  [obstructed ([place] index [BOOI] ? 40); equivocal (no definite   obstruction; BOOI 20-40); and no obstruction (BOOI ? 20)]     FLUOROSCOPIC IMAGING OF THE BLADDER DURING URODYNAMICS:  Fluoroscopy during today's procedure demonstrated a smooth walled bladder without diverticulae or cellules.  No vesicoureteral reflux was observed.  The bladder neck was closed during filling and open during voiding.     A/P:  47 year old female s/p VVF repair with some urgency and incontinence possibly related to stress induced urge incontinence and intrinsic sphincter deficiency.  Her UDS results were essentially normal with a good bladder capacity as well as compliance and inability to demonstrate bladder overactivity or incontinence despite volumes close to 500 cc but these were in a sitting position which she reports she does not have a problem with this.  We discussed trying some periurethral bulking vs. Interstim, vs. Botox.  But she firmly believes it is related to her urethra and would like to try periurethral bulking.   -schedule periurethral bulking.  Risks include infection, bleeding, and urinary retention.  We explained that this may not be the entire story and she understands that.    Thank you for allowing me to participate in the care of  Ms. Crista Ma and I will keep you updated on her progress.    Aubrey Boyd MD

## 2018-01-17 ENCOUNTER — MYC MEDICAL ADVICE (OUTPATIENT)
Dept: UROLOGY | Facility: CLINIC | Age: 48
End: 2018-01-17

## 2018-01-17 ENCOUNTER — TELEPHONE (OUTPATIENT)
Dept: UROLOGY | Facility: CLINIC | Age: 48
End: 2018-01-17

## 2018-01-17 NOTE — TELEPHONE ENCOUNTER
Procedure: cystoscopy and periurethral bulking (look in the bladder and inject filler into the walls of the urethra)  Facility: [facility]  Length: 30 minute(s)  Surgeon: Aubrey Boyd MD  Anesthesia: MAC  BMI: There is no height or weight on file to calculate BMI. (If over 43 for general or 45 for MAC cannot be scheduled at  ASC)   Pre-op Appointments needed: yes, with PCP  Post-op appointments needed:2 weeks   provider only   needed:  No  Surgery packet/instructions given to patient?  Yes   Special Considerations: none    Florina Win RN, BSN

## 2018-01-17 NOTE — TELEPHONE ENCOUNTER
Message left for the patient to call back to get surgery scheduled.  Fidelia Guzman, Surgery Scheduling Coordinator

## 2018-01-19 NOTE — TELEPHONE ENCOUNTER
Date Scheduled: 2-26-18  Facility: [facility]  Surgeon: Dr. Boyd   Post-op appointment scheduled:    scheduled?: No  Surgery packet/instructions confirmed received?  Yes  Special Considerations:   Fidelia Guzman, Surgery Scheduling Coordinator

## 2018-02-13 ENCOUNTER — OFFICE VISIT (OUTPATIENT)
Dept: PEDIATRICS | Facility: CLINIC | Age: 48
End: 2018-02-13
Payer: COMMERCIAL

## 2018-02-13 VITALS
OXYGEN SATURATION: 98 % | WEIGHT: 214.5 LBS | BODY MASS INDEX: 33.67 KG/M2 | HEART RATE: 99 BPM | DIASTOLIC BLOOD PRESSURE: 90 MMHG | TEMPERATURE: 97.2 F | HEIGHT: 67 IN | SYSTOLIC BLOOD PRESSURE: 140 MMHG

## 2018-02-13 DIAGNOSIS — N36.42 INTRINSIC SPHINCTER DEFICIENCY: ICD-10-CM

## 2018-02-13 DIAGNOSIS — Z01.818 PREOP GENERAL PHYSICAL EXAM: Primary | ICD-10-CM

## 2018-02-13 LAB
ALBUMIN UR-MCNC: 10 MG/DL
ANION GAP SERPL CALCULATED.3IONS-SCNC: 6 MMOL/L (ref 3–14)
APPEARANCE UR: ABNORMAL
BACTERIA #/AREA URNS HPF: ABNORMAL /HPF
BILIRUB UR QL STRIP: NEGATIVE
BUN SERPL-MCNC: 10 MG/DL (ref 7–30)
CALCIUM SERPL-MCNC: 9 MG/DL (ref 8.5–10.1)
CHLORIDE SERPL-SCNC: 104 MMOL/L (ref 94–109)
CO2 SERPL-SCNC: 27 MMOL/L (ref 20–32)
COLOR UR AUTO: YELLOW
CREAT SERPL-MCNC: 0.7 MG/DL (ref 0.52–1.04)
ERYTHROCYTE [DISTWIDTH] IN BLOOD BY AUTOMATED COUNT: 13 % (ref 10–15)
GFR SERPL CREATININE-BSD FRML MDRD: >90 ML/MIN/1.7M2
GLUCOSE SERPL-MCNC: 106 MG/DL (ref 70–99)
GLUCOSE UR STRIP-MCNC: NEGATIVE MG/DL
HCT VFR BLD AUTO: 44.6 % (ref 35–47)
HGB BLD-MCNC: 14.7 G/DL (ref 11.7–15.7)
HGB UR QL STRIP: NEGATIVE
KETONES UR STRIP-MCNC: 5 MG/DL
LEUKOCYTE ESTERASE UR QL STRIP: ABNORMAL
MCH RBC QN AUTO: 33.1 PG (ref 26.5–33)
MCHC RBC AUTO-ENTMCNC: 33 G/DL (ref 31.5–36.5)
MCV RBC AUTO: 101 FL (ref 78–100)
MUCOUS THREADS #/AREA URNS LPF: PRESENT /LPF
NITRATE UR QL: NEGATIVE
NON-SQ EPI CELLS #/AREA URNS LPF: ABNORMAL /LPF
PH UR STRIP: 6.5 PH (ref 5–7)
PLATELET # BLD AUTO: 233 10E9/L (ref 150–450)
POTASSIUM SERPL-SCNC: 4.1 MMOL/L (ref 3.4–5.3)
RBC # BLD AUTO: 4.44 10E12/L (ref 3.8–5.2)
RBC #/AREA URNS AUTO: ABNORMAL /HPF
SODIUM SERPL-SCNC: 137 MMOL/L (ref 133–144)
SOURCE: ABNORMAL
SP GR UR STRIP: 1.02 (ref 1–1.03)
UROBILINOGEN UR STRIP-MCNC: NORMAL MG/DL (ref 0–2)
WBC # BLD AUTO: 6.4 10E9/L (ref 4–11)
WBC #/AREA URNS AUTO: ABNORMAL /HPF
WBC CLUMPS #/AREA URNS HPF: PRESENT /HPF

## 2018-02-13 PROCEDURE — 36415 COLL VENOUS BLD VENIPUNCTURE: CPT | Performed by: NURSE PRACTITIONER

## 2018-02-13 PROCEDURE — 87086 URINE CULTURE/COLONY COUNT: CPT | Performed by: NURSE PRACTITIONER

## 2018-02-13 PROCEDURE — 80048 BASIC METABOLIC PNL TOTAL CA: CPT | Performed by: NURSE PRACTITIONER

## 2018-02-13 PROCEDURE — 81001 URINALYSIS AUTO W/SCOPE: CPT | Performed by: NURSE PRACTITIONER

## 2018-02-13 PROCEDURE — 99214 OFFICE O/P EST MOD 30 MIN: CPT | Performed by: NURSE PRACTITIONER

## 2018-02-13 PROCEDURE — 85027 COMPLETE CBC AUTOMATED: CPT | Performed by: NURSE PRACTITIONER

## 2018-02-13 NOTE — PROGRESS NOTES
[facility]  [address] [address] [place] [address]  [phone]  Dept: [phone]    PRE-OP EVALUATION:  Today's date: 2018    Crista Ma (: 1970) presents for pre-operative evaluation assessment as requested by Dr. Boyd.  She requires evaluation and anesthesia risk assessment prior to undergoing surgery/procedure for treatment of incontience .  Proposed procedure:Cystscopy and periurethral bulking     Date of Surgery/ Procedure: 18  Time of Surgery/ Procedure: 12:00PM  Hospital/Surgical Facility: [facility]  Fax number for surgical facility:   Primary Physician: [facility]  Type of Anesthesia Anticipated: to be determined    Patient has a Health Care Directive or Living Will:  NO    1. NO - Do you have a history of heart attack, stroke, stent, bypass or surgery on an artery in the head, neck, heart or legs?  2. NO - Do you ever have any pain or discomfort in your chest?  3. NO - Do you have a history of  Heart Failure?  4. NO - Are you troubled by shortness of breath when: walking on the level, up a slight hill or at night?  5. NO - Do you currently have a cold, bronchitis or other respiratory infection?  6. NO - Do you have a cough, shortness of breath or wheezing?  7. NO - Do you sometimes get pains in the calves of your legs when you walk?  8. NO - Do you or anyone in your family have previous history of blood clots?  9. NO - Do you or does anyone in your family have a serious bleeding problem such as prolonged bleeding following surgeries or cuts?  10. NO - Have you ever had problems with anemia or been told to take iron pills?  11. NO - Have you had any abnormal blood loss such as black, tarry or bloody stools, or abnormal vaginal bleeding?  12. NO - Have you ever had a blood transfusion?  13. NO - Have you or any of your relatives ever had problems with anesthesia?  14. NO - Do you have sleep apnea, excessive snoring or  daytime drowsiness?  15. NO - Do you have any prosthetic heart valves?  16. NO - Do you have prosthetic joints?  17. NO - Is there any chance that you may be pregnant?      HPI:                                                      Brief HPI related to upcoming procedure: treatment of incontience .  Proposed procedure:Cystscopy and periurethral bulking       ASTHMA - Patient has a longstanding history of moderate-severe Asthma . Patient has been doing well overall and continues on medication regimen  without adverse reactions or side effects.                                                                                                                                               .    MEDICAL HISTORY:                                                      Patient Active Problem List    Diagnosis Date Noted     Intrinsic sphincter deficiency 01/11/2018     Priority: Medium     Urge incontinence of urine 01/11/2018     Priority: Medium     Female stress incontinence 05/01/2017     Priority: Medium     VVF (vesicovaginal fistula) 01/09/2017     Priority: Medium     Post-op pain 12/27/2016     Priority: Medium     Menorrhagia 12/20/2016     Priority: Medium     Dysmenorrhea 12/20/2016     Priority: Medium     HARRY (stress urinary incontinence, female) 12/20/2016     Priority: Medium     Mild persistent asthma without complication 05/14/2015     Priority: Medium      Past Medical History:   Diagnosis Date     Allergic rhinitis, cause unspecified      Dysmenorrhea      Pneumothorax     spontaneous x 4-as a child r/t pneumonia     Seasonal affective disorder (H)     prozac      Unspecified asthma(493.90)     on singulair, advair     Past Surgical History:   Procedure Laterality Date     CYSTOSCOPY N/A 12/27/2016    Procedure: CYSTOSCOPY;  Surgeon: Abebe Michelle MD;  Location:  OR     DAVINCI ASSISTED VESICOVAGINAL ABDOMINAL, CYSTOSCOPY N/A 2/14/2017    Procedure: DAVINCI ASSISTED VESICOVAGINAL  ABDOMINAL, CYSTOSCOPY;  Surgeon: Aubrey Body MD;  Location: [facility] OR     HC ENLARGE BREAST WITH IMPLANT       HYSTERECTOMY TOTAL ABDOMINAL, SALPINGECTOMY N/A 12/27/2016    Procedure: HYSTERECTOMY TOTAL ABDOMINAL, SALPINGECTOMY;  Surgeon: Abebe Michelle MD;  Location: [facility] OR     HYSTERECTOMY, PAP NO LONGER INDICATED       LAPAROSCOPIC ASSISTED HYSTERECTOMY VAGINAL N/A 12/27/2016    Procedure: LAPAROSCOPIC ASSISTED HYSTERECTOMY VAGINAL;  Surgeon: Abebe Michelle MD;  Location: [facility] OR     LAPAROSCOPIC SALPINGECTOMY Bilateral 12/27/2016    Procedure: LAPAROSCOPIC SALPINGECTOMY;  Surgeon: Abebe Michelle MD;  Location: [facility] OR     REPAIR BLADDER N/A 12/27/2016    Procedure: REPAIR BLADDER;  Surgeon: Abebe Michelle MD;  Location: [facility] OR     wisdom teeth       Current Outpatient Prescriptions   Medication Sig Dispense Refill     mirabegron (MYRBETRIQ) 50 MG 24 hr tablet Take 1 tablet (50 mg) by mouth daily 30 tablet 5     Montelukast Sodium (SINGULAIR PO) Take 10 mg by mouth At Bedtime       budesonide-formoterol (SYMBICORT) 160-4.5 MCG/ACT Inhaler Inhale 1-2 puffs into the lungs 2 times daily       ALBUTEROL 108 (90 BASE) MCG/ACT inhaler Inhale 1-2 puffs into the lungs every 4 hours as needed   3     OTC products: no recent use of OTC ASA, NSAIDS or Steroids and no use of herbal medications or other supplements    Allergies   Allergen Reactions     No Known Drug Allergies       Latex Allergy: NO    Social History   Substance Use Topics     Smoking status: Never Smoker     Smokeless tobacco: Never Used      Comment: 1/2 pk/wk-social     Alcohol use 6.0 oz/week     10 Standard drinks or equivalent per week      Comment: 3 drinks/wk     History   Drug Use No       REVIEW OF SYSTEMS:                                                    C: NEGATIVE for fever, chills, change in weight  INTEGUMENTARY/SKIN: NEGATIVE for rash   E/M: NEGATIVE for ear, mouth  "and throat problems  R: NEGATIVE for significant cough or SOB  CV: NEGATIVE for chest pain, palpitations or peripheral edema  GI: NEGATIVE for nausea, abdominal pain, heartburn, or change in bowel habits   female: incontinence-stress  M: NEGATIVE for significant arthralgias or myalgia  N: NEGATIVE for weakness, dizziness or paresthesias  E: NEGATIVE for temperature intolerance, skin/hair changes  H: NEGATIVE for bleeding problems  P: NEGATIVE for changes in mood or affect    EXAM:                                                    /90 (BP Location: Right arm, Patient Position: Sitting, Cuff Size: Adult Regular)  Pulse 99  Temp 97.2  F (36.2  C) (Temporal)  Ht 5' 7\" (1.702 m)  Wt 214 lb 8 oz (97.3 kg)  LMP 12/06/2016 (Exact Date)  SpO2 98%  Breastfeeding? No  BMI 33.6 kg/m2    GENERAL APPEARANCE: healthy, alert and no distress     HENT: ear canals and TM's normal and nose and mouth without ulcers or lesions     NECK: no adenopathy, no asymmetry, masses, or scars and thyroid normal to palpation     RESP: lungs clear to auscultation - no rales, rhonchi or wheezes     CV: regular rates and rhythm and no irregular beats     ABDOMEN:  soft, nontender, no HSM or masses and bowel sounds normal     MS: extremities normal- no gross deformities noted, no evidence of inflammation in joints, FROM in all extremities.     SKIN: no suspicious lesions or rashes     NEURO: Normal strength and tone, sensory exam grossly normal, mentation intact and speech normal     PSYCH: mentation appears normal. and affect normal/bright    DIAGNOSTICS:                                                    EKG: Not indicated due to non-vascular surgery and low risk of event (age <65 and without cardiac risk factors)  Results for orders placed or performed in visit on 02/13/18   Basic metabolic panel   Result Value Ref Range    Sodium 137 133 - 144 mmol/L    Potassium 4.1 3.4 - 5.3 mmol/L    Chloride 104 94 - 109 mmol/L    Carbon Dioxide " 27 20 - 32 mmol/L    Anion Gap 6 3 - 14 mmol/L    Glucose 106 (H) 70 - 99 mg/dL    Urea Nitrogen 10 7 - 30 mg/dL    Creatinine 0.70 0.52 - 1.04 mg/dL    GFR Estimate >90 >60 mL/min/1.7m2    GFR Estimate If Black >90 >60 mL/min/1.7m2    Calcium 9.0 8.5 - 10.1 mg/dL   CBC with platelets   Result Value Ref Range    WBC 6.4 4.0 - 11.0 10e9/L    RBC Count 4.44 3.8 - 5.2 10e12/L    Hemoglobin 14.7 11.7 - 15.7 g/dL    Hematocrit 44.6 35.0 - 47.0 %     (H) 78 - 100 fl    MCH 33.1 (H) 26.5 - 33.0 pg    MCHC 33.0 31.5 - 36.5 g/dL    RDW 13.0 10.0 - 15.0 %    Platelet Count 233 150 - 450 10e9/L   UA with Microscopic reflex to Culture   Result Value Ref Range    Color Urine Yellow     Appearance Urine Slightly Cloudy     Glucose Urine Negative NEG^Negative mg/dL    Bilirubin Urine Negative NEG^Negative    Ketones Urine 5 (A) NEG^Negative mg/dL    Specific Gravity Urine 1.021 1.003 - 1.035    Blood Urine Negative NEG^Negative    pH Urine 6.5 5.0 - 7.0 pH    Protein Albumin Urine 10 (A) NEG^Negative mg/dL    Urobilinogen mg/dL Normal 0.0 - 2.0 mg/dL    Nitrite Urine Negative NEG^Negative    Leukocyte Esterase Urine Small (A) NEG^Negative    Source Midstream Urine     WBC Urine 2-5 (A) OTO2^O - 2 /HPF    RBC Urine O - 2 OTO2^O - 2 /HPF    WBC Clumps Present (A) NEG^Negative /HPF    Bacteria Urine Few (A) NEG^Negative /HPF    Squamous Epithelial /LPF Urine Moderate (A) FEW^Few /LPF    Mucous Urine Present (A) NEG^Negative /LPF   Urine Culture Aerobic Bacterial   Result Value Ref Range    Specimen Description Midstream Urine     Culture Micro       50,000 to 100,000 colonies/mL  mixed urogenital mitchell         IMPRESSION:                                                    Reason for surgery/procedure: treatment of incontience .  Proposed procedure:Cystscopy and periurethral bulking   Diagnosis/reason for consult: preop evaluation     The proposed surgical procedure is considered INTERMEDIATE risk.    REVISED CARDIAC RISK  INDEX  The patient has the following serious cardiovascular risks for perioperative complications such as (MI, PE, VFib and 3  AV Block):  No serious cardiac risks  INTERPRETATION: 1 risks: Class II (low risk - 0.9% complication rate)    The patient has the following additional risks for perioperative complications:  The 10-year ASCVD risk score (Carolee SAHA Jr, et al., 2013) is: 0.8%    Values used to calculate the score:      Age: 47 years      Sex: Female      Is Non- : No      Diabetic: No      Tobacco smoker: No      Systolic Blood Pressure: 140 mmHg      Is BP treated: No      HDL Cholesterol: 89 mg/dL      Total Cholesterol: 284 mg/dL    Crista was seen today for pre-op exam.    Diagnoses and all orders for this visit:    Preop general physical exam  -     Basic metabolic panel  -     CBC with platelets  -     UA with Microscopic reflex to Culture  -     Urine Culture Aerobic Bacterial    Intrinsic sphincter deficiency  -     Basic metabolic panel  -     CBC with platelets  -     UA with Microscopic reflex to Culture  -     Urine Culture Aerobic Bacterial    RECOMMENDATIONS:                                                        APPROVAL GIVEN to proceed with proposed procedure, without further diagnostic evaluation       Signed Electronically by: TELMA Herrera CNP    Copy of this evaluation report is provided to requesting physician.    [facility] Preop Guidelines

## 2018-02-13 NOTE — NURSING NOTE
"Chief Complaint   Patient presents with     Pre-Op Exam     DOS:2/26/18       Initial /90 (BP Location: Right arm, Patient Position: Sitting, Cuff Size: Adult Regular)  Pulse 99  Temp 97.2  F (36.2  C) (Temporal)  Ht 5' 7\" (1.702 m)  Wt 214 lb 8 oz (97.3 kg)  LMP 12/06/2016 (Exact Date)  SpO2 98%  Breastfeeding? No  BMI 33.6 kg/m2 Estimated body mass index is 33.6 kg/(m^2) as calculated from the following:    Height as of this encounter: 5' 7\" (1.702 m).    Weight as of this encounter: 214 lb 8 oz (97.3 kg).  Medication Reconciliation: complete      GINA Bender      "

## 2018-02-13 NOTE — MR AVS SNAPSHOT
After Visit Summary   2/13/2018    Crista Ma    MRN: [id number]           Patient Information     Date Of Birth          1970        Visit Information        Provider Department      2/13/2018 11:30 AM Darlene Saldana APRN [facility]        Today's Diagnoses     Preop general physical exam    -  1    Intrinsic sphincter deficiency          Care Instructions    Will follow up and/or notify patient of  results via My Chart to determine further need for followup      Before Your Surgery      Call your surgeon if there is any change in your health. This includes signs of a cold or flu (such as a sore throat, runny nose, cough, rash or fever).    Do not smoke, drink alcohol or take over the counter medicine (unless your surgeon or primary care doctor tells you to) for the 24 hours before and after surgery.    If you take prescribed drugs: Follow your doctor s orders about which medicines to take and which to stop until after surgery.    Eating and drinking prior to surgery: follow the instructions from your surgeon    Take a shower or bath the night before surgery. Use the soap your surgeon gave you to gently clean your skin. If you do not have soap from your surgeon, use your regular soap. Do not shave or scrub the surgery site.  Wear clean pajamas and have clean sheets on your bed.     It was a pleasure seeing you today at the [facility]. Thank you for allowing us to care for you today. We truly hope we provided you with the excellent service you deserve. Please let us know if there is anything else we can do for you so we can be sure you are leaving completley satisfied with your care experience.       General information about your clinic   Clinic Hours Lab Hours (Appointments are required)   Mon-Thurs: 7:30 AM - 7 PM Mon-Thurs: 7:30 AM - 7 PM   Fri: 7:30 AM - 5 PM Fri: 7:30 AM - 5 PM        After Hours Nurse Advise & Appts:  [facility]  Nurse Advisors: [phone]  [facility] On Call: to make appointments anytime: [phone] On Call Physician: call [phone] and answering service will page the on call physician.        For urgent appointments, please call [phone] and ask for the triage nurse or your care team clinic nurse.  How to contact my care team:  MyChart: [email or web address]   Phone: [phone]   Fax: [phone]       [facility]:   Phone: [phone]  Hours: 8:00 AM - 6:00 PM  Medication Refills:  Call your pharmacy and they will forward the refill to us. Please allow 3 business days for your refills to be completed.       Normal or non-critical lab and imaging results will be communicated to you by MyChart, letter or phone within 7 days.  If you do not hear from us within 10 days, please call the clinic. If you have a critical or abnormal lab result, we will notify you by phone as soon as possible.       We now have PWIC (Pediatric Walk in Care)  Monday-Friday from 7:30-4. Simply walk in and be seen for your urgent needs like cough, fever, rash, diarrhea or vomiting, pink eye, UTI. No appointments needed. Ask one of the team for more information      -Your Care Team:    Dr. Jadiel Gaona - Internal Medicine/Pediatrics   Dr. Selma Santos - Family Medicine  Dr. Lisa Muro - Pediatrics  Darlene Saldana CNP - Family Practice Nurse Practitioner  Dr. Mihaela Roman - Pediatrics                       Follow-ups after your visit        Your next 10 appointments already scheduled     Feb 26, 2018   Procedure with Aubrey Boyd MD   [facility] (--)    [address] [facility] [address] [phone]              Who to contact     If you have questions or need follow up information about today's clinic visit or your schedule please contact [facility] directly at [phone].  Normal or non-critical lab and imaging results will be communicated to you by MyChart, letter or  "phone within 4 business days after the clinic has received the results. If you do not hear from us within 7 days, please contact the clinic through [employer] or phone. If you have a critical or abnormal lab result, we will notify you by phone as soon as possible.  Submit refill requests through [employer] or call your pharmacy and they will forward the refill request to us. Please allow 3 business days for your refill to be completed.          Additional Information About Your Visit        [employer] Information     [employer] gives you secure access to your electronic health record. If you see a primary care provider, you can also send messages to your care team and make appointments. If you have questions, please call your primary care clinic.  If you do not have a primary care provider, please call [phone] and they will assist you.      [employer] is an electronic gateway that provides easy, online access to your medical records. With [employer], you can request a clinic appointment, read your test results, renew a prescription or communicate with your care team.     To access your existing account, please contact your [facility] or call [phone] for assistance.        Care EveryWhere ID     This is your Care EveryWhere ID. This could be used by other organizations to access your [place] medical records  [id number]        Your Vitals Were     Pulse Temperature Height Last Period Pulse Oximetry Breastfeeding?    99 97.2  F (36.2  C) (Temporal) 5' 7\" (1.702 m) 12/06/2016 (Exact Date) 98% No    BMI (Body Mass Index)                   33.6 kg/m2            Blood Pressure from Last 3 Encounters:   02/13/18 140/90   01/11/18 (!) 156/99   10/02/17 146/85    Weight from Last 3 Encounters:   02/13/18 214 lb 8 oz (97.3 kg)   08/30/17 216 lb 1.6 oz (98 kg)   02/22/17 200 lb (90.7 kg)              We Performed the Following     Basic metabolic panel     CBC with platelets     UA with Microscopic " reflex to Culture          Today's Medication Changes          These changes are accurate as of 2/13/18 11:53 AM.  If you have any questions, ask your nurse or doctor.               Stop taking these medicines if you haven't already. Please contact your care team if you have questions.     methenamine hippurate 1 G Tabs tablet   Commonly known as:  HIPREX   Stopped by:  Darlene Saldana APRN CNP                    Primary Care Provider Office Phone # Fax #    [facility] [phone] [address]  [facility] [address]        Equal Access to Services     [facility]: Hadii aad ku hadasho Soomaali, waaxda luqadaha, qaybta kaalmada adeegyada, caroline wsidom . So [facility] [phone].    ATENCIÓN: Si habla español, tiene a dimas disposición servicios gratuitos de asistencia lingüística. [facility] [phone].    We comply with applicable federal civil rights laws and [place] laws. We do not discriminate on the basis of race, color, national origin, age, disability, sex, sexual orientation, or gender identity.            Thank you!     Thank you for choosing [facility]  for your care. Our goal is always to provide you with excellent care. Hearing back from our patients is one way we can continue to improve our services. Please take a few minutes to complete the written survey that you may receive in the mail after your visit with us. Thank you!             Your Updated Medication List - Protect others around you: Learn how to safely use, store and throw away your medicines at www.disposemymeds.org.          This list is accurate as of 2/13/18 11:53 AM.  Always use your most recent med list.                   Brand Name Dispense Instructions for use Diagnosis    budesonide-formoterol 160-4.5 MCG/ACT Inhaler    SYMBICORT     Inhale 1-2 puffs into the lungs 2 times daily        mirabegron 50 MG 24 hr tablet    MYRBETRIQ    30  tablet    Take 1 tablet (50 mg) by mouth daily    Urinary urgency       PROAIR  (90 BASE) MCG/ACT Inhaler   Generic drug:  albuterol      Inhale 1-2 puffs into the lungs every 4 hours as needed    Jose-colored stools       SINGULAIR PO      Take 10 mg by mouth At Bedtime

## 2018-02-13 NOTE — PATIENT INSTRUCTIONS
Will follow up and/or notify patient of  results via My Chart to determine further need for followup      Before Your Surgery      Call your surgeon if there is any change in your health. This includes signs of a cold or flu (such as a sore throat, runny nose, cough, rash or fever).    Do not smoke, drink alcohol or take over the counter medicine (unless your surgeon or primary care doctor tells you to) for the 24 hours before and after surgery.    If you take prescribed drugs: Follow your doctor s orders about which medicines to take and which to stop until after surgery.    Eating and drinking prior to surgery: follow the instructions from your surgeon    Take a shower or bath the night before surgery. Use the soap your surgeon gave you to gently clean your skin. If you do not have soap from your surgeon, use your regular soap. Do not shave or scrub the surgery site.  Wear clean pajamas and have clean sheets on your bed.     It was a pleasure seeing you today at the [facility]. Thank you for allowing us to care for you today. We truly hope we provided you with the excellent service you deserve. Please let us know if there is anything else we can do for you so we can be sure you are leaving completley satisfied with your care experience.       General information about your clinic   Clinic Hours Lab Hours (Appointments are required)   Mon-Thurs: 7:30 AM - 7 PM Mon-Thurs: 7:30 AM - 7 PM   Fri: 7:30 AM - 5 PM Fri: 7:30 AM - 5 PM        After Hours Nurse Advise & Appts:  [facility] Nurse Advisors: [phone]  [facility] On Call: to make appointments anytime: [phone] On Call Physician: call [phone] and answering service will page the on call physician.        For urgent appointments, please call [phone] and ask for the triage nurse or your care team clinic nurse.  How to contact my care team:  MyChart: [email or web address]   Phone: [phone]   Fax: [phone]        [facility]:   Phone: [phone]  Hours: 8:00 AM - 6:00 PM  Medication Refills:  Call your pharmacy and they will forward the refill to us. Please allow 3 business days for your refills to be completed.       Normal or non-critical lab and imaging results will be communicated to you by MyChart, letter or phone within 7 days.  If you do not hear from us within 10 days, please call the clinic. If you have a critical or abnormal lab result, we will notify you by phone as soon as possible.       We now have PWIC (Pediatric Walk in Care)  Monday-Friday from 7:30-4. Simply walk in and be seen for your urgent needs like cough, fever, rash, diarrhea or vomiting, pink eye, UTI. No appointments needed. Ask one of the team for more information      -Your Care Team:    Dr. Jadiel Gaona - Internal Medicine/Pediatrics   Dr. Selma Santos - Family Medicine  Dr. Lisa Muro - Pediatrics  Darlene Saldana CNP - Family Practice Nurse Practitioner  Dr. Mihaela Roman - Pediatrics

## 2018-02-14 ASSESSMENT — ASTHMA QUESTIONNAIRES: ACT_TOTALSCORE: 25

## 2018-02-15 LAB
BACTERIA SPEC CULT: NORMAL
SPECIMEN SOURCE: NORMAL

## 2018-02-16 NOTE — PROGRESS NOTES
Tone Ma,    Attached are your test results.  -Urine culture is abnormal but it looks like a contaminated, non clean-catch sample.  There is no need for antibiotics at this point.  If new, worsening or persistent symptoms, then you should call or return for a recheck.     Please contact us if you have any questions.    Darlene Saldana, CNP

## 2018-02-23 ENCOUNTER — ANESTHESIA EVENT (OUTPATIENT)
Dept: SURGERY | Facility: AMBULATORY SURGERY CENTER | Age: 48
End: 2018-02-23

## 2018-02-26 ENCOUNTER — ANESTHESIA (OUTPATIENT)
Dept: SURGERY | Facility: AMBULATORY SURGERY CENTER | Age: 48
End: 2018-02-26
Payer: COMMERCIAL

## 2018-02-26 ENCOUNTER — SURGERY (OUTPATIENT)
Age: 48
End: 2018-02-26

## 2018-02-26 ENCOUNTER — HOSPITAL ENCOUNTER (OUTPATIENT)
Facility: AMBULATORY SURGERY CENTER | Age: 48
Discharge: HOME | End: 2018-02-26
Attending: UROLOGY | Admitting: UROLOGY
Payer: COMMERCIAL

## 2018-02-26 VITALS
RESPIRATION RATE: 16 BRPM | TEMPERATURE: 96.9 F | OXYGEN SATURATION: 100 % | SYSTOLIC BLOOD PRESSURE: 143 MMHG | DIASTOLIC BLOOD PRESSURE: 74 MMHG

## 2018-02-26 PROCEDURE — L8606 SYNTHETIC IMPLNT URINARY 1ML: HCPCS

## 2018-02-26 PROCEDURE — 51715 ENDOSCOPIC INJECTION/IMPLANT: CPT

## 2018-02-26 PROCEDURE — 51715 ENDOSCOPIC INJECTION/IMPLANT: CPT | Performed by: UROLOGY

## 2018-02-26 PROCEDURE — G8907 PT DOC NO EVENTS ON DISCHARG: HCPCS

## 2018-02-26 PROCEDURE — G8916 PT W IV AB GIVEN ON TIME: HCPCS

## 2018-02-26 DEVICE — IMP MACROPLASTIQUE BULKING AGENT INJ SYR 2.5ML MPQ-2.5: Type: IMPLANTABLE DEVICE | Site: URETHRA | Status: FUNCTIONAL

## 2018-02-26 RX ORDER — KETOROLAC TROMETHAMINE 30 MG/ML
30 INJECTION, SOLUTION INTRAMUSCULAR; INTRAVENOUS EVERY 6 HOURS PRN
Status: DISCONTINUED | OUTPATIENT
Start: 2018-02-26 | End: 2018-02-27 | Stop reason: HOSPADM

## 2018-02-26 RX ORDER — ONDANSETRON 2 MG/ML
4 INJECTION INTRAMUSCULAR; INTRAVENOUS EVERY 30 MIN PRN
Status: DISCONTINUED | OUTPATIENT
Start: 2018-02-26 | End: 2018-02-27 | Stop reason: HOSPADM

## 2018-02-26 RX ORDER — NALOXONE HYDROCHLORIDE 0.4 MG/ML
.1-.4 INJECTION, SOLUTION INTRAMUSCULAR; INTRAVENOUS; SUBCUTANEOUS
Status: DISCONTINUED | OUTPATIENT
Start: 2018-02-26 | End: 2018-02-27 | Stop reason: HOSPADM

## 2018-02-26 RX ORDER — FENTANYL CITRATE 50 UG/ML
25-50 INJECTION, SOLUTION INTRAMUSCULAR; INTRAVENOUS
Status: DISCONTINUED | OUTPATIENT
Start: 2018-02-26 | End: 2018-02-27 | Stop reason: HOSPADM

## 2018-02-26 RX ORDER — ACETAMINOPHEN 325 MG/1
975 TABLET ORAL ONCE
Status: COMPLETED | OUTPATIENT
Start: 2018-02-26 | End: 2018-02-26

## 2018-02-26 RX ORDER — HYDROMORPHONE HYDROCHLORIDE 1 MG/ML
.3-.5 INJECTION, SOLUTION INTRAMUSCULAR; INTRAVENOUS; SUBCUTANEOUS EVERY 10 MIN PRN
Status: DISCONTINUED | OUTPATIENT
Start: 2018-02-26 | End: 2018-02-27 | Stop reason: HOSPADM

## 2018-02-26 RX ORDER — ALBUTEROL SULFATE 0.83 MG/ML
2.5 SOLUTION RESPIRATORY (INHALATION) EVERY 4 HOURS PRN
Status: DISCONTINUED | OUTPATIENT
Start: 2018-02-26 | End: 2018-02-27 | Stop reason: HOSPADM

## 2018-02-26 RX ORDER — GABAPENTIN 300 MG/1
300 CAPSULE ORAL ONCE
Status: COMPLETED | OUTPATIENT
Start: 2018-02-26 | End: 2018-02-26

## 2018-02-26 RX ORDER — LIDOCAINE 40 MG/G
CREAM TOPICAL
Status: DISCONTINUED | OUTPATIENT
Start: 2018-02-26 | End: 2018-02-27 | Stop reason: HOSPADM

## 2018-02-26 RX ORDER — LIDOCAINE HYDROCHLORIDE 20 MG/ML
INJECTION, SOLUTION INFILTRATION; PERINEURAL PRN
Status: DISCONTINUED | OUTPATIENT
Start: 2018-02-26 | End: 2018-02-26

## 2018-02-26 RX ORDER — PROPOFOL 10 MG/ML
INJECTION, EMULSION INTRAVENOUS PRN
Status: DISCONTINUED | OUTPATIENT
Start: 2018-02-26 | End: 2018-02-26

## 2018-02-26 RX ORDER — ONDANSETRON 4 MG/1
4 TABLET, ORALLY DISINTEGRATING ORAL EVERY 30 MIN PRN
Status: DISCONTINUED | OUTPATIENT
Start: 2018-02-26 | End: 2018-02-27 | Stop reason: HOSPADM

## 2018-02-26 RX ORDER — SODIUM CHLORIDE, SODIUM LACTATE, POTASSIUM CHLORIDE, CALCIUM CHLORIDE 600; 310; 30; 20 MG/100ML; MG/100ML; MG/100ML; MG/100ML
INJECTION, SOLUTION INTRAVENOUS CONTINUOUS
Status: DISCONTINUED | OUTPATIENT
Start: 2018-02-26 | End: 2018-02-27 | Stop reason: HOSPADM

## 2018-02-26 RX ORDER — HYDROCODONE BITARTRATE AND ACETAMINOPHEN 5; 325 MG/1; MG/1
1 TABLET ORAL ONCE
Status: DISCONTINUED | OUTPATIENT
Start: 2018-02-26 | End: 2018-02-27 | Stop reason: HOSPADM

## 2018-02-26 RX ORDER — PROPOFOL 10 MG/ML
INJECTION, EMULSION INTRAVENOUS CONTINUOUS PRN
Status: DISCONTINUED | OUTPATIENT
Start: 2018-02-26 | End: 2018-02-26

## 2018-02-26 RX ORDER — DEXAMETHASONE SODIUM PHOSPHATE 4 MG/ML
4 INJECTION, SOLUTION INTRA-ARTICULAR; INTRALESIONAL; INTRAMUSCULAR; INTRAVENOUS; SOFT TISSUE EVERY 10 MIN PRN
Status: DISCONTINUED | OUTPATIENT
Start: 2018-02-26 | End: 2018-02-27 | Stop reason: HOSPADM

## 2018-02-26 RX ORDER — MEPERIDINE HYDROCHLORIDE 25 MG/ML
12.5 INJECTION INTRAMUSCULAR; INTRAVENOUS; SUBCUTANEOUS
Status: DISCONTINUED | OUTPATIENT
Start: 2018-02-26 | End: 2018-02-27 | Stop reason: HOSPADM

## 2018-02-26 RX ORDER — CEFAZOLIN SODIUM 2 G/100ML
2 INJECTION, SOLUTION INTRAVENOUS
Status: COMPLETED | OUTPATIENT
Start: 2018-02-26 | End: 2018-02-26

## 2018-02-26 RX ORDER — CEFAZOLIN SODIUM 1 G/3ML
1 INJECTION, POWDER, FOR SOLUTION INTRAMUSCULAR; INTRAVENOUS SEE ADMIN INSTRUCTIONS
Status: DISCONTINUED | OUTPATIENT
Start: 2018-02-26 | End: 2018-02-27 | Stop reason: HOSPADM

## 2018-02-26 RX ORDER — ONDANSETRON 2 MG/ML
INJECTION INTRAMUSCULAR; INTRAVENOUS PRN
Status: DISCONTINUED | OUTPATIENT
Start: 2018-02-26 | End: 2018-02-26

## 2018-02-26 RX ORDER — GLYCOPYRROLATE 0.2 MG/ML
INJECTION, SOLUTION INTRAMUSCULAR; INTRAVENOUS PRN
Status: DISCONTINUED | OUTPATIENT
Start: 2018-02-26 | End: 2018-02-26

## 2018-02-26 RX ORDER — FENTANYL CITRATE 50 UG/ML
INJECTION, SOLUTION INTRAMUSCULAR; INTRAVENOUS PRN
Status: DISCONTINUED | OUTPATIENT
Start: 2018-02-26 | End: 2018-02-26

## 2018-02-26 RX ADMIN — GLYCOPYRROLATE 0.2 MG: 0.2 INJECTION, SOLUTION INTRAMUSCULAR; INTRAVENOUS at 10:47

## 2018-02-26 RX ADMIN — ONDANSETRON 4 MG: 2 INJECTION INTRAMUSCULAR; INTRAVENOUS at 10:52

## 2018-02-26 RX ADMIN — SODIUM CHLORIDE, SODIUM LACTATE, POTASSIUM CHLORIDE, CALCIUM CHLORIDE: 600; 310; 30; 20 INJECTION, SOLUTION INTRAVENOUS at 10:30

## 2018-02-26 RX ADMIN — GABAPENTIN 300 MG: 300 CAPSULE ORAL at 10:12

## 2018-02-26 RX ADMIN — LIDOCAINE HYDROCHLORIDE 40 MG: 20 INJECTION, SOLUTION INFILTRATION; PERINEURAL at 10:39

## 2018-02-26 RX ADMIN — CEFAZOLIN SODIUM 2 G: 2 INJECTION, SOLUTION INTRAVENOUS at 10:35

## 2018-02-26 RX ADMIN — PROPOFOL 150 MCG/KG/MIN: 10 INJECTION, EMULSION INTRAVENOUS at 10:39

## 2018-02-26 RX ADMIN — PROPOFOL 80 MG: 10 INJECTION, EMULSION INTRAVENOUS at 10:39

## 2018-02-26 RX ADMIN — FENTANYL CITRATE 100 MCG: 50 INJECTION, SOLUTION INTRAMUSCULAR; INTRAVENOUS at 10:35

## 2018-02-26 RX ADMIN — ACETAMINOPHEN 975 MG: 325 TABLET ORAL at 10:12

## 2018-02-26 NOTE — ANESTHESIA CARE TRANSFER NOTE
Patient: Crista Ma    Procedure(s):  Cystscopy and periurethral bulking - Wound Class: II-Clean Contaminated    Diagnosis: Intrinsic sphincter deficency  Diagnosis Additional Information: No value filed.    Anesthesia Type:   MAC     Note:  Airway :Room Air  Patient transferred to:Phase II  Handoff Report: Identifed the Patient, Identified the Reponsible Provider, Reviewed the pertinent medical history, Discussed the surgical course, Reviewed Intra-OP anesthesia mangement and issues during anesthesia, Set expectations for post-procedure period and Allowed opportunity for questions and acknowledgement of understanding      Vitals: (Last set prior to Anesthesia Care Transfer)    CRNA VITALS  2/26/2018 1033 - 2/26/2018 1107      2/26/2018             EKG: NSR                Electronically Signed By: TELMA Weinstein CRNA  February 26, 2018  11:07 AM

## 2018-02-26 NOTE — ANESTHESIA POSTPROCEDURE EVALUATION
Patient: Crista Ma    Procedure(s):  Cystscopy and periurethral bulking - Wound Class: II-Clean Contaminated    Diagnosis:Intrinsic sphincter deficency  Diagnosis Additional Information: No value filed.    Anesthesia Type:  MAC    Note:  Anesthesia Post Evaluation    Patient location during evaluation: PACU  Patient participation: Able to fully participate in evaluation  Level of consciousness: awake  Pain management: adequate  Airway patency: patent  Cardiovascular status: acceptable  Respiratory status: acceptable  Hydration status: acceptable  PONV: none     Anesthetic complications: None    Comments: Stable recovery noted.        Last vitals:  Vitals:    02/26/18 1011 02/26/18 1100 02/26/18 1125   BP: 134/78 120/71 143/74   Resp: 16 16 16   Temp: 98.4  F (36.9  C) 96.9  F (36.1  C)    SpO2: 95% 95% 100%         Electronically Signed By: Clive Holden MD  February 26, 2018  12:08 PM

## 2018-02-26 NOTE — ANESTHESIA PREPROCEDURE EVALUATION
Anesthesia Evaluation     . Pt has had prior anesthetic. Type: General    No history of anesthetic complications          ROS/MED HX    ENT/Pulmonary:     (+)Intermittent asthma , . .    Neurologic:  - neg neurologic ROS     Cardiovascular:  - neg cardiovascular ROS       METS/Exercise Tolerance:     Hematologic:  - neg hematologic  ROS       Musculoskeletal:  - neg musculoskeletal ROS       GI/Hepatic:  - neg GI/hepatic ROS       Renal/Genitourinary:     (+) Other Renal/ Genitourinary, Stress incintinence      Endo:  - neg endo ROS       Psychiatric:  - neg psychiatric ROS       Infectious Disease:  - neg infectious disease ROS       Malignancy:      - no malignancy   Other:    - neg other ROS                 Physical Exam  Normal systems: cardiovascular, pulmonary and dental    Airway   Mallampati: I  TM distance: >3 FB  Neck ROM: full    Dental     Cardiovascular       Pulmonary    breath sounds clear to auscultation                    Anesthesia Plan      History & Physical Review  History and physical reviewed and following examination; no interval change.    ASA Status:  2 .    NPO Status:  > 8 hours    Plan for MAC with Intravenous and Propofol induction. Maintenance will be TIVA.  Reason for MAC:  Extreme anxiety (QS)  PONV prophylaxis:  Ondansetron (or other 5HT-3) and Dexamethasone or Solumedrol       Postoperative Care  Postoperative pain management:  Multi-modal analgesia.      Consents  Anesthetic plan, risks, benefits and alternatives discussed with:  Patient and Spouse..                          .

## 2018-02-26 NOTE — OP NOTE
Pre-op Dx: stress incontinence secondary to intrinsic sphincter deficiency  Post-op Dx: Same   Procedure performed: cystoscopy and periurethral bulking with macroplastique  Surgeon: Aubrey Boyd MD   Assistant surgeons: none  Anesthesia: mac   EBL: 2 cc   Complications: None   Disposition: Stable to PACU   Clinical Indication: Ms. Crista Ma is a 47 year old female with a hx of stress incontinence thought to be secondary to ISD.  We discussed options and elected to proceed with the above.    Clinical Procedure:   Pt. Was identified correctly, consented and placed in the lithotomy position.  She was cleaned and preparred in the usual sterile fashion.  Lidocain gel was inserted into the urethra and given time to take effect.  A storz rigid cystoscope was then inserted through the urethra and into the bladder.  The urethra was wnl, open bladder neck.  The bladder was with 1+ trabeculation.  No tumors, diverticulae, or stones, however some squamous metaplasia.  Bilateral u/o's were effluxing clear urine.   A needle was then inserted and the scope was withdrawn to the area of the mid urethra and inserted at the 6 o'clock position.  There an entire syringe full was injected.  There was the beginning of good coaptation.  After waiting 30 seconds on the clock the needle was withdrawn and then the same was repeated at the 2 and 10 o'clock positions using 1/2 a syringe full of filler into each site.  There was very good coaptation at the end of the procedure. The cystoscope was then withdrawn.  The pt. Tolerated the procedure well.  She will need to void prior to discharge.    Aubrey Boyd MD

## 2018-02-26 NOTE — IP AVS SNAPSHOT
MRN:[id number]                      After Visit Summary   2/26/2018    Crista Ma    MRN: [id number]           Thank you!     Thank you for choosing [facility] for your care. Our goal is always to provide you with excellent care. Hearing back from our patients is one way we can continue to improve our services. Please take a few minutes to complete the written survey that you may receive in the mail after you visit with us. Thank you!        Patient Information     Date Of Birth          1970        About your hospital stay     You were admitted on:  February 26, 2018 You last received care in the:  [facility] [place]    You were discharged on:  February 26, 2018       Who to Call     For medical emergencies, please call 911.  For non-urgent questions about your medical care, please call your primary care provider or clinic, [phone]  For questions related to your surgery, please call your surgery clinic        Attending Provider     Provider Specialty    Aubrey Boyd MD Urology       Primary Care Provider Office Phone # Fax #    [facility] [phone]      After Care Instructions     Diet Instructions       Resume pre procedure diet            Encourage fluids       Encourage fluids at home to keep urine clear to light pink            No Alcohol       for 24 hours post procedure            No driving or operating machinery        until the day after procedure            No lifting over 15 pounds and no strenuous physical activity       for 2 weeks                  Further instructions from your care team       [facility]  Same-Day Surgery   Adult Discharge Orders & Instructions   For 24 hours after surgery  1. Get plenty of rest.  A responsible adult must stay with you for at least 24 hours after you leave the hospital.   2. Do not drive or use heavy equipment.  If you have weakness or tingling, don't drive  or use heavy equipment until this feeling goes away.  3. Do not drink alcohol.  4. Avoid strenuous or risky activities.  Ask for help when climbing stairs.   5. You may feel lightheaded.  IF so, sit for a few minutes before standing.  Have someone help you get up.   6. If you have nausea (feel sick to your stomach): Drink only clear liquids such as apple juice, ginger ale, broth or 7-Up.  Rest may also help.  Be sure to drink enough fluids.  Move to a regular diet as you feel able.  7. You may have a slight fever. Call the doctor if your fever is over 100 F (37.7 C) (taken under the tongue) or lasts longer than 24 hours.  8. You may have a dry mouth, a sore throat, muscle aches or trouble sleeping.  These should go away after 24 hours.  9. Do not make important or legal decisions.   Call your doctor for any of the followin.  Signs of infection (fever, growing tenderness at the surgery site, a large amount of drainage or bleeding, severe pain, foul-smelling drainage, redness, swelling).    2. It has been over 8 to 10 hours since surgery and you are still not able to urinate (pass water).    3.  Headache for over 24 hours.    4.  Numbness, tingling or weakness the day after surgery (if you had spinal anesthesia).        Pending Results     No orders found from 2018 to 2018.            Admission Information     Date & Time Provider Department Dept. Phone    2018 Aubrey Boyd MD [facility] [phone]      Your Vitals Were     Blood Pressure Temperature Respirations Last Period Pulse Oximetry       120/71 96.9  F (36.1  C) (Temporal) 16 2016 (Exact Date) 95%       MyChart Information     [facility] gives you secure access to your electronic health record. If you see a primary care provider, you can also send messages to your care team and make appointments. If you have questions, please call your primary care clinic.  If you do not have a primary care provider, please call  [phone] and they will assist you.        Care EveryWhere ID     This is your Care EveryWhere ID. This could be used by other organizations to access your [place] medical records  [id number]        Equal Access to Services     ANA WHITT : Hadii aad ku hadlilo Soisaiah, waaxda luqadaha, qaybta kaalmada adeshantel, caroline livewan cloudbrandi balderas artis jade. So [facility] [phone].    ATENCIÓN: Si habla español, tiene a dimas disposición servicios gratuitos de asistencia lingüística. Llame al [phone].    We comply with applicable federal civil rights laws and [place] laws. We do not discriminate on the basis of race, color, national origin, age, disability, sex, sexual orientation, or gender identity.               Review of your medicines      CONTINUE these medicines which have NOT CHANGED        Dose / Directions    budesonide-formoterol 160-4.5 MCG/ACT Inhaler   Commonly known as:  SYMBICORT        Dose:  1-2 puff   Inhale 1-2 puffs into the lungs 2 times daily   Refills:  0       mirabegron 50 MG 24 hr tablet   Commonly known as:  MYRBETRIQ   Used for:  Urinary urgency        Dose:  50 mg   Take 1 tablet (50 mg) by mouth daily   Quantity:  30 tablet   Refills:  5       PROAIR  (90 BASE) MCG/ACT Inhaler   Used for:  Jose-colored stools   Generic drug:  albuterol        Dose:  1-2 puff   Inhale 1-2 puffs into the lungs every 4 hours as needed   Refills:  3       SINGULAIR PO        Dose:  10 mg   Take 10 mg by mouth At Bedtime   Refills:  0                Protect others around you: Learn how to safely use, store and throw away your medicines at www.disposemymeds.org.             Medication List: This is a list of all your medications and when to take them. Check marks below indicate your daily home schedule. Keep this list as a reference.      Medications           Morning Afternoon Evening Bedtime As Needed    budesonide-formoterol 160-4.5 MCG/ACT Inhaler   Commonly known as:  SYMBICORT   Inhale  1-2 puffs into the lungs 2 times daily                                mirabegron 50 MG 24 hr tablet   Commonly known as:  MYRBETRIQ   Take 1 tablet (50 mg) by mouth daily                                PROAIR  (90 BASE) MCG/ACT Inhaler   Inhale 1-2 puffs into the lungs every 4 hours as needed   Generic drug:  albuterol                                SINGULAIR PO   Take 10 mg by mouth At Bedtime

## 2018-02-26 NOTE — IP AVS SNAPSHOT
[facility]    [address] [place] [address]    Phone:  [phone]                                       After Visit Summary   2/26/2018    Crista Ma    MRN: [id number]           After Visit Summary Signature Page     I have received my discharge instructions, and my questions have been answered. I have discussed any challenges I see with this plan with the nurse or doctor.    ..........................................................................................................................................  Patient/Patient Representative Signature      ..........................................................................................................................................  Patient Representative Print Name and Relationship to Patient    ..................................................               ................................................  Date                                            Time    ..........................................................................................................................................  Reviewed by Signature/Title    ...................................................              ..............................................  Date                                                            Time

## 2018-02-26 NOTE — DISCHARGE INSTRUCTIONS
[facility]  Same-Day Surgery   Adult Discharge Orders & Instructions   For 24 hours after surgery  1. Get plenty of rest.  A responsible adult must stay with you for at least 24 hours after you leave the hospital.   2. Do not drive or use heavy equipment.  If you have weakness or tingling, don't drive or use heavy equipment until this feeling goes away.  3. Do not drink alcohol.  4. Avoid strenuous or risky activities.  Ask for help when climbing stairs.   5. You may feel lightheaded.  IF so, sit for a few minutes before standing.  Have someone help you get up.   6. If you have nausea (feel sick to your stomach): Drink only clear liquids such as apple juice, ginger ale, broth or 7-Up.  Rest may also help.  Be sure to drink enough fluids.  Move to a regular diet as you feel able.  7. You may have a slight fever. Call the doctor if your fever is over 100 F (37.7 C) (taken under the tongue) or lasts longer than 24 hours.  8. You may have a dry mouth, a sore throat, muscle aches or trouble sleeping.  These should go away after 24 hours.  9. Do not make important or legal decisions.   Call your doctor for any of the followin.  Signs of infection (fever, growing tenderness at the surgery site, a large amount of drainage or bleeding, severe pain, foul-smelling drainage, redness, swelling).    2. It has been over 8 to 10 hours since surgery and you are still not able to urinate (pass water).    3.  Headache for over 24 hours.    4.  Numbness, tingling or weakness the day after surgery (if you had spinal anesthesia).

## 2018-03-19 DIAGNOSIS — R31.9 HEMATURIA: Primary | ICD-10-CM

## 2018-04-17 ENCOUNTER — TELEPHONE (OUTPATIENT)
Dept: MAMMOGRAPHY | Facility: CLINIC | Age: 48
End: 2018-04-17

## 2018-04-17 NOTE — TELEPHONE ENCOUNTER
4/17/2018    Attempt 1    Contacted patient in regards to scheduling VIP mammogram  Message on voicemail       Comments:       Outreach   MARIA C

## 2018-07-03 ENCOUNTER — MYC MEDICAL ADVICE (OUTPATIENT)
Dept: UROLOGY | Facility: CLINIC | Age: 48
End: 2018-07-03

## 2019-09-12 ENCOUNTER — MYC MEDICAL ADVICE (OUTPATIENT)
Dept: UROLOGY | Facility: CLINIC | Age: 49
End: 2019-09-12

## 2019-09-16 ENCOUNTER — OFFICE VISIT (OUTPATIENT)
Dept: UROLOGY | Facility: CLINIC | Age: 49
End: 2019-09-16
Payer: COMMERCIAL

## 2019-09-16 VITALS — DIASTOLIC BLOOD PRESSURE: 92 MMHG | OXYGEN SATURATION: 97 % | HEART RATE: 89 BPM | SYSTOLIC BLOOD PRESSURE: 141 MMHG

## 2019-09-16 DIAGNOSIS — N39.41 URGE INCONTINENCE: ICD-10-CM

## 2019-09-16 DIAGNOSIS — N39.0 RECURRENT UTI: Primary | ICD-10-CM

## 2019-09-16 LAB
ALBUMIN UR-MCNC: NEGATIVE MG/DL
APPEARANCE UR: CLEAR
BILIRUB UR QL STRIP: NEGATIVE
COLOR UR AUTO: ABNORMAL
GLUCOSE UR STRIP-MCNC: NEGATIVE MG/DL
HGB UR QL STRIP: NEGATIVE
KETONES UR STRIP-MCNC: NEGATIVE MG/DL
LEUKOCYTE ESTERASE UR QL STRIP: NEGATIVE
NITRATE UR QL: NEGATIVE
PH UR STRIP: 6 PH (ref 5–7)
SOURCE: ABNORMAL
SP GR UR STRIP: 1 (ref 1–1.03)
UROBILINOGEN UR STRIP-MCNC: NORMAL MG/DL (ref 0–2)

## 2019-09-16 PROCEDURE — 81003 URINALYSIS AUTO W/O SCOPE: CPT | Performed by: UROLOGY

## 2019-09-16 PROCEDURE — 99213 OFFICE O/P EST LOW 20 MIN: CPT | Performed by: UROLOGY

## 2019-09-16 NOTE — PROGRESS NOTES
Reason for Visit:  F/u on urinary symptoms.    Clinical Data: Ms. Crista Ma is a 49 year old female with a hx of vesicovaginal fistula s/p robot-assisted repair and midurethral sling on 2/14/17.  She had been having some urinary urgency and urge incontinence despite being on Detrol so it was switched to Myrbetriq in August of 2017 which helped somewhat.  She was also given referral to PT in oct 2017.    She also underwent periurethral bulking on 2/26/18 but does not feel like it helped.  She feels the symptoms have gotten worse.    BP (!) 141/92 (BP Location: Left arm, Patient Position: Sitting, Cuff Size: Adult Regular)   Pulse 89   LMP 12/06/2016 (Exact Date)   SpO2 97%   NAD  A/O X 3   skin warm  Moving all extremities    I reviewed the following tests again today with the patient including her cystoscopy and the UDS.    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    She tried the PT but did not feel like it helped.  Her biggest problem is when she sits up from a sitting position, then she just gushes out.      UDS on 8/30/17:    VOIDING DIARY:  Voids anywhere from 10-20 times per day, nocturia x 2-3.  Episodes of incontinence: numerous per day and night.  Incontinence associated with: strong urge, coughing, sneezing, standing, without awareness, while sleeping.  Total Volume Intake: 2250 mL of water day, 2 cups of coffee or tea in the morning  Total Volume Output: unknown    UROFLOWMETRY:  Voided volume: 73 mL.  Maximum flow rate: 11.1 mL/sec.  Average flow rate: 5.4 mL/sec.  Postvoid residual by catheter: 80 mL.  Character of the curve: bell-shaped curve.    DURING THE FILLING PHASE:  First sensation: 248 mL.  First Desire: 248 mL.  Strong Desire: 347 mL.  Maximum Capacity: 477  mL.     Uninhibited detrusor contractions: no appreciable DO/UDC's  Compliance: Good. PDet=6 cmH20 at capacity. Compliance ratio of 80.  Continence: no stress leak at Pabd 117 cm H2O at a volume of 413 mL.  EMG: concordant during filling.     DURING THE VOIDING PHASE:  Maximum detrusor contraction with void: 47.4 cm of H2O pressure.  Voided volume: 477 mL.  Maximum flow rate: 27.7 mL/sec.  Average flow rate: 8.8 mL/sec.  Detrusor sphincter dyssynergia: some increased EMG activity during voiding, but suspect this represents movement or dysfunctional voiding rather than true DSD.  Character of voiding curve: bell-shaped curve.  BOOI: -18.9 (suggesting no obstruction - see key below)  [obstructed (HOUSTON index [BOOI] ? 40); equivocal (no definite   obstruction; BOOI 20-40); and no obstruction (BOOI ? 20)]     FLUOROSCOPIC IMAGING OF THE BLADDER DURING URODYNAMICS:  Fluoroscopy during today's procedure demonstrated a smooth walled bladder without diverticulae or cellules.  No vesicoureteral reflux was observed.  The bladder neck was closed during filling and open during voiding.     A/P:  47 year old female s/p VVF repair with some urgency and incontinence possibly related to stress induced urge incontinence and intrinsic sphincter deficiency.  Her UDS results did not demonstrate bladder overactivity or incontinence despite volumes close to 500 cc but these were in a sitting position which she reports she does not have a problem with this.  We discussed trying Interstim, vs. Botox.  The patient feels ready now to try the interstim.  We discussed the process of a PNE and she would like to proceed.  -schedule PNE  -She has discontinued all other bladder medications as she feels they did not help.    Thank you for allowing me to participate in the care of  Ms. Crista Ma and I will keep you updated on her progress.    Aubrey Boyd MD

## 2019-09-16 NOTE — NURSING NOTE
Crista Ma's chief complaint for this visit includes:  Chief Complaint   Patient presents with     Incontinence     has tried bulking and reconstructive surgery     PCP: Aubrey Boyd    Referring Provider:  Referred Self, MD  No address on file    BP (!) 141/92 (BP Location: Left arm, Patient Position: Sitting, Cuff Size: Adult Regular)   Pulse 89   LMP 12/06/2016 (Exact Date)   SpO2 97%   Data Unavailable     Do you need any medication refills at today's visit? No    Rosita Herman LPN

## 2019-09-16 NOTE — LETTER
Date: Sep 16, 2019    TO WHOM IT MAY CONCERN:    Patient Crista Ma had surgery on 2/26/18. Please date back the cancellation of her gym membership to the date of surgery. If there are any questions, please call [phone].      Thank you,    Aubrey Boyd MD

## 2019-09-16 NOTE — LETTER
Patient:  Crista Ma  :   1970  MRN:     [id number]        Ms.Teena MERRICK Ma  [address] [address]        2019    Dear ,    We are writing to inform you of your test result which are essentially normal.    Resulted Orders   UA reflex to Microscopic and Culture   Result Value Ref Range    Color Urine Straw     Appearance Urine Clear     Glucose Urine Negative NEG^Negative mg/dL    Bilirubin Urine Negative NEG^Negative    Ketones Urine Negative NEG^Negative mg/dL    Specific Gravity Urine 1.002 (L) 1.003 - 1.035    Blood Urine Negative NEG^Negative    pH Urine 6.0 5.0 - 7.0 pH    Protein Albumin Urine Negative NEG^Negative mg/dL    Urobilinogen mg/dL Normal 0.0 - 2.0 mg/dL    Nitrite Urine Negative NEG^Negative    Leukocyte Esterase Urine Negative NEG^Negative    Source Midstream Urine      If you have any questions or concerns, please call the clinic at [phone]    Thank you,    The office of Aubrey Boyd MD

## 2019-09-23 ENCOUNTER — MYC MEDICAL ADVICE (OUTPATIENT)
Dept: UROLOGY | Facility: CLINIC | Age: 49
End: 2019-09-23

## 2019-09-23 NOTE — TELEPHONE ENCOUNTER
Called patient and told her the orders placed and santos will call her soon Merle Jacob LPN Staff Nurse

## 2019-10-10 DIAGNOSIS — R39.15 URINARY URGENCY: ICD-10-CM

## 2019-10-10 DIAGNOSIS — N39.41 URGENCY INCONTINENCE: ICD-10-CM

## 2019-10-10 DIAGNOSIS — N32.81 OAB (OVERACTIVE BLADDER): Primary | ICD-10-CM

## 2019-10-11 ENCOUNTER — TELEPHONE (OUTPATIENT)
Dept: UROLOGY | Facility: CLINIC | Age: 49
End: 2019-10-11

## 2019-10-11 PROBLEM — N39.41 URGENCY INCONTINENCE: Status: ACTIVE | Noted: 2019-10-11

## 2019-10-11 PROBLEM — N32.81 OAB (OVERACTIVE BLADDER): Status: ACTIVE | Noted: 2019-10-11

## 2019-10-11 PROBLEM — R39.15 URINARY URGENCY: Status: ACTIVE | Noted: 2019-10-11

## 2019-10-22 DIAGNOSIS — N39.0 RECURRENT UTI: Primary | ICD-10-CM

## 2019-10-31 ENCOUNTER — OFFICE VISIT (OUTPATIENT)
Dept: PEDIATRICS | Facility: CLINIC | Age: 49
End: 2019-10-31
Payer: COMMERCIAL

## 2019-10-31 VITALS
WEIGHT: 213 LBS | TEMPERATURE: 98.2 F | DIASTOLIC BLOOD PRESSURE: 82 MMHG | HEART RATE: 84 BPM | SYSTOLIC BLOOD PRESSURE: 128 MMHG | HEIGHT: 67 IN | BODY MASS INDEX: 33.43 KG/M2 | OXYGEN SATURATION: 100 %

## 2019-10-31 DIAGNOSIS — J45.30 MILD PERSISTENT ASTHMA WITHOUT COMPLICATION: ICD-10-CM

## 2019-10-31 DIAGNOSIS — N39.41 URGE INCONTINENCE OF URINE: ICD-10-CM

## 2019-10-31 DIAGNOSIS — Z01.818 PREOP GENERAL PHYSICAL EXAM: Primary | ICD-10-CM

## 2019-10-31 PROBLEM — N32.81 OAB (OVERACTIVE BLADDER): Status: RESOLVED | Noted: 2019-10-11 | Resolved: 2019-10-31

## 2019-10-31 PROCEDURE — 99204 OFFICE O/P NEW MOD 45 MIN: CPT | Performed by: INTERNAL MEDICINE

## 2019-10-31 ASSESSMENT — MIFFLIN-ST. JEOR: SCORE: 1637.75

## 2019-10-31 NOTE — PROGRESS NOTES
[place] [id number] [place] [id number]  [phone]  Dept: [phone]    PRE-OP EVALUATION:  Today's date: 10/31/2019    Crista Ma (: 1970) presents for pre-operative evaluation assessment as requested by Dr. Aubrey Boyd.  She requires evaluation and anesthesia risk assessment prior to undergoing surgery/procedure for treatment of INSERTION, NEUROSTIMULATOR, SACRAL, PERCUTANEOUS, FOR TRIAL     Proposed Surgery/ Procedure: INSERTION, NEUROSTIMULATOR, SACRAL, PERCUTANEOUS, FOR TRIAL  Date of Surgery/ Procedure: 19  Time of Surgery/ Procedure: 09:25 am  Hospital/Surgical Facility: [facility]  Available Electronically  Primary Physician: Aubrey Boyd  Type of Anesthesia Anticipated: General    Patient has a Health Care Directive or Living Will:  YES, not on file    1. NO - Do you have a history of heart attack, stroke, stent, bypass or surgery on an artery in the head, neck, heart or legs?  2. NO - Do you ever have any pain or discomfort in your chest?  3. NO - Do you have a history of  Heart Failure?  4. NO - Are you troubled by shortness of breath when: walking on the level, up a slight hill or at night?  5. NO - Do you currently have a cold, bronchitis or other respiratory infection?  6. NO - Do you have a cough, shortness of breath or wheezing?  7. NO - Do you sometimes get pains in the calves of your legs when you walk?  8. NO - Do you or anyone in your family have previous history of blood clots?  9. NO - Do you or does anyone in your family have a serious bleeding problem such as prolonged bleeding following surgeries or cuts?  10. NO - Have you ever had problems with anemia or been told to take iron pills?  11. NO - Have you had any abnormal blood loss such as black, tarry or bloody stools, or abnormal vaginal bleeding?  12. NO - Have you ever had a blood transfusion?  13. NO - Have you or any of your relatives ever had  problems with anesthesia?  14. NO - Do you have sleep apnea, excessive snoring or daytime drowsiness?  15. NO - Do you have any prosthetic heart valves?  16. NO - Do you have prosthetic joints?  17. NO - Is there any chance that you may be pregnant?      HPI:     HPI related to upcoming procedure:     49-year-old young lady with history of mild asthma is to undergo subcutaneous insertion of a neurostimulator as a trial for her chronic issue of urge incontinence.  Patient's history is significant for having surgical complication of hysterectomy resulting in vesicovaginal fistula requiring robotic assisted repair and mid urethral sling on 2/14/2017.  She has had urge incontinence since then and has been tried on Detrol and Myrbetriq.  Overactive bladder has been ruled out.    Patient indicates her asthma is mild and she has not had an exacerbation in years.  She uses her inhalers regularly denies chest pain or shortness of breath.  Offers no other concerns or complaints.  She is non-smoker.      MEDICAL HISTORY:     Patient Active Problem List    Diagnosis Date Noted     Urgency incontinence 10/11/2019     Priority: Medium     Added automatically from request for surgery [id number]       Urinary urgency 10/11/2019     Priority: Medium     Added automatically from request for surgery [id number]       Intrinsic sphincter deficiency 01/11/2018     Priority: Medium     Urge incontinence of urine 01/11/2018     Priority: Medium     Female stress incontinence 05/01/2017     Priority: Medium     VVF (vesicovaginal fistula) 01/09/2017     Priority: Medium     Post-op pain 12/27/2016     Priority: Medium     Menorrhagia 12/20/2016     Priority: Medium     Dysmenorrhea 12/20/2016     Priority: Medium     HARRY (stress urinary incontinence, female) 12/20/2016     Priority: Medium     Mild persistent asthma without complication 05/14/2015     Priority: Medium      Past Medical History:   Diagnosis Date     Allergic rhinitis, cause  unspecified      Dysmenorrhea      Pneumothorax     spontaneous x 4-as a child r/t pneumonia     Seasonal affective disorder (H)     prozac      Unspecified asthma(493.90)     on singulair, advair     Past Surgical History:   Procedure Laterality Date     CYSTOSCOPY N/A 12/27/2016    Procedure: CYSTOSCOPY;  Surgeon: Abebe Michelle MD;  Location: [facility] OR     CYSTOSCOPY, INJECT COLLAGEN, COMBINED N/A 2/26/2018    Procedure: COMBINED CYSTOSCOPY, INJECT BULKING AGENT;  Cystscopy and periurethral bulking;  Surgeon: Aubrey Boyd MD;  Location: [facility] OR     DAVINCI ASSISTED VESICOVAGINAL ABDOMINAL, CYSTOSCOPY N/A 2/14/2017    Procedure: DAVINCI ASSISTED VESICOVAGINAL ABDOMINAL, CYSTOSCOPY;  Surgeon: Aubrey Boyd MD;  Location: [facility]     HC ENLARGE BREAST WITH IMPLANT       HYSTERECTOMY TOTAL ABDOMINAL, SALPINGECTOMY N/A 12/27/2016    Procedure: HYSTERECTOMY TOTAL ABDOMINAL, SALPINGECTOMY;  Surgeon: Abebe Michelle MD;  Location: [facility] OR     HYSTERECTOMY, PAP NO LONGER INDICATED       LAPAROSCOPIC ASSISTED HYSTERECTOMY VAGINAL N/A 12/27/2016    Procedure: LAPAROSCOPIC ASSISTED HYSTERECTOMY VAGINAL;  Surgeon: Abebe Michelle MD;  Location: [facility]     LAPAROSCOPIC SALPINGECTOMY Bilateral 12/27/2016    Procedure: LAPAROSCOPIC SALPINGECTOMY;  Surgeon: Abebe Michelle MD;  Location: [facility]     REPAIR BLADDER N/A 12/27/2016    Procedure: REPAIR BLADDER;  Surgeon: Abebe Michelle MD;  Location: [facility] OR     wisdom teeth       Current Outpatient Medications   Medication Sig Dispense Refill     ALBUTEROL 108 (90 BASE) MCG/ACT inhaler Inhale 1-2 puffs into the lungs every 4 hours as needed   3     budesonide-formoterol (SYMBICORT) 160-4.5 MCG/ACT Inhaler Inhale 1-2 puffs into the lungs 2 times daily       Montelukast Sodium (SINGULAIR PO) Take 10 mg by mouth At Bedtime       mirabegron (MYRBETRIQ) 50 MG 24 hr tablet Take 1 tablet (50 mg)  "by mouth daily (Patient not taking: Reported on 9/16/2019) 30 tablet 5     OTC products: None, except as noted above    Allergies   Allergen Reactions     No Known Drug Allergies       Latex Allergy: NO    Social History     Tobacco Use     Smoking status: Never Smoker     Smokeless tobacco: Never Used     Tobacco comment: 1/2 pk/wk-social   Substance Use Topics     Alcohol use: Yes     Alcohol/week: 10.0 standard drinks     Types: 10 Standard drinks or equivalent per week     Comment: 4-10 a wk     History   Drug Use No       REVIEW OF SYSTEMS:   Constitutional, neuro, ENT, endocrine, pulmonary, cardiac, gastrointestinal, genitourinary, musculoskeletal, integument and psychiatric systems are negative, except as otherwise noted.    EXAM:   /82 (BP Location: Right arm, Patient Position: Sitting, Cuff Size: Adult Regular)   Pulse 84   Temp 98.2  F (36.8  C) (Temporal)   Ht 1.708 m (5' 7.25\")   Wt 96.6 kg (213 lb)   LMP 12/06/2016 (Exact Date)   SpO2 100%   BMI 33.11 kg/m      GENERAL APPEARANCE: healthy, alert and no distress     EYES: EOMI, PERRL     HENT: ear canals and TM's normal and nose and mouth without ulcers or lesions     NECK: no adenopathy, no asymmetry, masses, or scars and thyroid normal to palpation     RESP: lungs clear to auscultation - no rales, rhonchi or wheezes     CV: regular rates and rhythm, normal S1 S2, no S3 or S4 and no murmur, click or rub     ABDOMEN:  soft, nontender, no HSM or masses and bowel sounds normal     MS: extremities normal- no gross deformities noted, no evidence of inflammation in joints, FROM in all extremities.     SKIN: no suspicious lesions or rashes     NEURO: Normal strength and tone, sensory exam grossly normal, mentation intact and speech normal     PSYCH: mentation appears normal. and affect normal/bright     LYMPHATICS: No cervical adenopathy    DIAGNOSTICS:   No labs or EKG required for low risk surgery (cataract, skin procedure, breast biopsy, " etc)    Recent Labs   Lab Test 02/13/18  1156 01/31/17  0739   HGB 14.7 12.7    301    141   POTASSIUM 4.1 4.1   CR 0.70 0.75        IMPRESSION:   Diagnosis/reason for consult:   1.  Urge incontinence.  Patient to undergo a trial of subcutaneous stimulator.  2.  Complication of vaginal assisted hysterectomy in December 2016 resulting in vesicular vaginal fistula requiring robotic assisted repair and mid urethral sling on 2/14/2017.  3.  Mild asthma stable.  Patient on Symbicort and Singulair.  She uses albuterol on a as needed basis.    Patient offered pneumonia vaccination which she declined.  She was offered flu vaccine today which she also declined.      The proposed surgical procedure is considered LOW risk.    REVISED CARDIAC RISK INDEX  The patient has the following serious cardiovascular risks for perioperative complications such as (MI, PE, VFib and 3  AV Block):  No serious cardiac risks  INTERPRETATION: 0 risks: Class I (very low risk - 0.4% complication rate)    The patient has the following additional risks for perioperative complications:  No identified additional risks      ICD-10-CM    1. Preop general physical exam Z01.818    2. Mild persistent asthma without complication J45.30    3. Urge incontinence of urine N39.41        RECOMMENDATIONS:     Patient is medically optimized to undergo planned surgical procedure.    --Patient is to take all scheduled medications on the day of surgery EXCEPT for modifications listed below.    APPROVAL GIVEN to proceed with proposed procedure, without further diagnostic evaluation       Signed Electronically by: Jus Gomez MD    Copy of this evaluation report is provided to requesting physician.    [place] Preop Guidelines    Revised Cardiac Risk Index

## 2019-11-04 ENCOUNTER — ANESTHESIA EVENT (OUTPATIENT)
Dept: SURGERY | Facility: AMBULATORY SURGERY CENTER | Age: 49
End: 2019-11-04

## 2019-11-04 ENCOUNTER — HEALTH MAINTENANCE LETTER (OUTPATIENT)
Age: 49
End: 2019-11-04

## 2019-11-05 ENCOUNTER — ANESTHESIA (OUTPATIENT)
Dept: SURGERY | Facility: AMBULATORY SURGERY CENTER | Age: 49
End: 2019-11-05

## 2019-11-05 ENCOUNTER — ANCILLARY PROCEDURE (OUTPATIENT)
Dept: RADIOLOGY | Facility: AMBULATORY SURGERY CENTER | Age: 49
End: 2019-11-05
Attending: UROLOGY
Payer: COMMERCIAL

## 2019-11-05 ENCOUNTER — HOSPITAL ENCOUNTER (OUTPATIENT)
Facility: AMBULATORY SURGERY CENTER | Age: 49
End: 2019-11-05
Attending: UROLOGY
Payer: COMMERCIAL

## 2019-11-05 VITALS
TEMPERATURE: 97.8 F | DIASTOLIC BLOOD PRESSURE: 53 MMHG | RESPIRATION RATE: 16 BRPM | HEART RATE: 73 BPM | HEIGHT: 68 IN | OXYGEN SATURATION: 96 % | WEIGHT: 210 LBS | BODY MASS INDEX: 31.83 KG/M2 | SYSTOLIC BLOOD PRESSURE: 118 MMHG

## 2019-11-05 DIAGNOSIS — N32.81 OAB (OVERACTIVE BLADDER): ICD-10-CM

## 2019-11-05 DIAGNOSIS — N39.41 URGENCY INCONTINENCE: ICD-10-CM

## 2019-11-05 DIAGNOSIS — R39.15 URINARY URGENCY: ICD-10-CM

## 2019-11-05 DIAGNOSIS — N39.41 URGENCY INCONTINENCE: Primary | ICD-10-CM

## 2019-11-05 DEVICE — KIT NEUROSTIMULATIOR TEST INTERSTIM TL23: Type: IMPLANTABLE DEVICE | Site: BACK | Status: FUNCTIONAL

## 2019-11-05 RX ORDER — ONDANSETRON 4 MG/1
4 TABLET, ORALLY DISINTEGRATING ORAL EVERY 30 MIN PRN
Status: DISCONTINUED | OUTPATIENT
Start: 2019-11-05 | End: 2019-11-06 | Stop reason: HOSPADM

## 2019-11-05 RX ORDER — MEPERIDINE HYDROCHLORIDE 25 MG/ML
12.5 INJECTION INTRAMUSCULAR; INTRAVENOUS; SUBCUTANEOUS
Status: DISCONTINUED | OUTPATIENT
Start: 2019-11-05 | End: 2019-11-06 | Stop reason: HOSPADM

## 2019-11-05 RX ORDER — SODIUM CHLORIDE, SODIUM LACTATE, POTASSIUM CHLORIDE, CALCIUM CHLORIDE 600; 310; 30; 20 MG/100ML; MG/100ML; MG/100ML; MG/100ML
INJECTION, SOLUTION INTRAVENOUS CONTINUOUS
Status: DISCONTINUED | OUTPATIENT
Start: 2019-11-05 | End: 2019-11-05 | Stop reason: HOSPADM

## 2019-11-05 RX ORDER — ONDANSETRON 2 MG/ML
4 INJECTION INTRAMUSCULAR; INTRAVENOUS EVERY 30 MIN PRN
Status: DISCONTINUED | OUTPATIENT
Start: 2019-11-05 | End: 2019-11-06 | Stop reason: HOSPADM

## 2019-11-05 RX ORDER — ACETAMINOPHEN 325 MG/1
975 TABLET ORAL ONCE
Status: COMPLETED | OUTPATIENT
Start: 2019-11-05 | End: 2019-11-05

## 2019-11-05 RX ORDER — LIDOCAINE 40 MG/G
CREAM TOPICAL
Status: DISCONTINUED | OUTPATIENT
Start: 2019-11-05 | End: 2019-11-05 | Stop reason: HOSPADM

## 2019-11-05 RX ORDER — OXYCODONE HYDROCHLORIDE 5 MG/1
5 TABLET ORAL EVERY 6 HOURS PRN
Qty: 10 TABLET | Refills: 0 | Status: SHIPPED | OUTPATIENT
Start: 2019-11-05 | End: 2019-11-05

## 2019-11-05 RX ORDER — CEPHALEXIN 500 MG/1
500 CAPSULE ORAL 3 TIMES DAILY
Qty: 15 CAPSULE | Refills: 0 | Status: SHIPPED | OUTPATIENT
Start: 2019-11-05 | End: 2019-12-03

## 2019-11-05 RX ORDER — FENTANYL CITRATE 50 UG/ML
25-50 INJECTION, SOLUTION INTRAMUSCULAR; INTRAVENOUS
Status: DISCONTINUED | OUTPATIENT
Start: 2019-11-05 | End: 2019-11-05 | Stop reason: HOSPADM

## 2019-11-05 RX ORDER — SENNA AND DOCUSATE SODIUM 50; 8.6 MG/1; MG/1
1 TABLET, FILM COATED ORAL
Qty: 20 TABLET | Refills: 0 | Status: SHIPPED | OUTPATIENT
Start: 2019-11-05 | End: 2019-12-05

## 2019-11-05 RX ORDER — SODIUM CHLORIDE, SODIUM LACTATE, POTASSIUM CHLORIDE, CALCIUM CHLORIDE 600; 310; 30; 20 MG/100ML; MG/100ML; MG/100ML; MG/100ML
INJECTION, SOLUTION INTRAVENOUS CONTINUOUS
Status: DISCONTINUED | OUTPATIENT
Start: 2019-11-05 | End: 2019-11-06 | Stop reason: HOSPADM

## 2019-11-05 RX ORDER — GABAPENTIN 300 MG/1
300 CAPSULE ORAL ONCE
Status: COMPLETED | OUTPATIENT
Start: 2019-11-05 | End: 2019-11-05

## 2019-11-05 RX ORDER — OXYCODONE HYDROCHLORIDE 5 MG/1
5 TABLET ORAL EVERY 4 HOURS PRN
Status: DISCONTINUED | OUTPATIENT
Start: 2019-11-05 | End: 2019-11-06 | Stop reason: HOSPADM

## 2019-11-05 RX ORDER — NALOXONE HYDROCHLORIDE 0.4 MG/ML
.1-.4 INJECTION, SOLUTION INTRAMUSCULAR; INTRAVENOUS; SUBCUTANEOUS
Status: DISCONTINUED | OUTPATIENT
Start: 2019-11-05 | End: 2019-11-06 | Stop reason: HOSPADM

## 2019-11-05 RX ADMIN — GABAPENTIN 300 MG: 300 CAPSULE ORAL at 08:26

## 2019-11-05 RX ADMIN — ACETAMINOPHEN 975 MG: 325 TABLET ORAL at 08:26

## 2019-11-05 RX ADMIN — SODIUM CHLORIDE, SODIUM LACTATE, POTASSIUM CHLORIDE, CALCIUM CHLORIDE: 600; 310; 30; 20 INJECTION, SOLUTION INTRAVENOUS at 08:28

## 2019-11-05 ASSESSMENT — MIFFLIN-ST. JEOR: SCORE: 1628.11

## 2019-11-05 NOTE — OP NOTE
OPERATIVE REPORT    PREOPERATIVE DIAGNOSIS:  Urinary urgency and frequency and urge incontinence    POSTOPERATIVE DIAGNOSIS: Same    PROCEDURES PERFORMED:   Percutaneous neural examenation    STAFF SURGEON: Dr. Aubrey Boyd MD, available for entire case.     RESIDENT(S):  Yin Lyons MD    ANESTHESIA: Local     ESTIMATED BLOOD LOSS: 1 mL.     DRAINS: None    Reason for Visit:  PNE    Operative indications:    Ms. Crista Ma is a 49 year old female with a hx of urinary urgency and frequency.  Patient has tried different treatments for her problem and they have not been successful.  After discussing further management options, the patient has elected to proceed with a percutaneous neural examination as a trial for a permanent interstim implant.    Procedure:  The patient was identified correctly, consented and placed in the prone position.  The patient's sacral area was prepped and draped in the usual sterile fashion.  Using the fluoroscope in the AP position the medial aspects of the sacral foramena were identified and marked.  The fluoroscope was then placed in the lateral position and the S3 foramen was identified and marked.  Using marcaine with bicarb bilateral insertion sites were injected to anesthetize the skin.  Once this was achieved a 3 1/2 inch needle was inserted on the right side until it was passed through the S3 foramen as seen on fluoroscopy.      Next the needle was tested on the RIGHT and the patient had a holden response at about 0.7 and a sensory response in rectum, towards the vagina.  The same was then done on the LEFT with no holden response seen at about 0.2, but with sensory response in the vaginal area. Of note, the left side was higher in the foramen       At this point the stylet was removed from the insertion needle and the electrode was passed until the 3 1/2 inch macie on both sides.  The needle was pulled out leaving the electrode in place.  These were secured down with  steri-strips and a dressing was applied.    All appropriate wires were put in place and attached to the generator and the [employer] representative went over instructions with the patient.    Patient will f/u with Dr. Boyd in 1 week for lead removal and to go over results of the trial.    Thank you for allowing us to participate in the care of  Ms. Crista Ma and we will keep you updated on her progress.    Yin Lyons MD    Patient was seen, evaluated and plan was formulated in conjunction with me and I agree with the above.  I performed the above procedure.  Aubrey Boyd MD

## 2019-11-05 NOTE — ANESTHESIA PREPROCEDURE EVALUATION
Anesthesia Pre-Procedure Evaluation    Patient: Crista Ma   MRN:     [id number] Gender:   female   Age:    49 year old :      1970        Preoperative Diagnosis: OAB (overactive bladder) [N32.81]  Urgency incontinence [N39.41]  Urinary urgency [R39.15]   Procedure(s):  INSERTION, NEUROSTIMULATOR, SACRAL, PERCUTANEOUS, FOR TRIAL     Past Medical History:   Diagnosis Date     Allergic rhinitis, cause unspecified      Dysmenorrhea      Pneumothorax     spontaneous x 4-as a child r/t pneumonia     Seasonal affective disorder (H)     prozac      Unspecified asthma(493.90)     on singulair, advair      Past Surgical History:   Procedure Laterality Date     CYSTOSCOPY N/A 2016    Procedure: CYSTOSCOPY;  Surgeon: Abebe Michelle MD;  Location:  OR     CYSTOSCOPY, INJECT COLLAGEN, COMBINED N/A 2018    Procedure: COMBINED CYSTOSCOPY, INJECT BULKING AGENT;  Cystscopy and periurethral bulking;  Surgeon: Aubrey Boyd MD;  Location: [facility] OR     DAVINCI ASSISTED VESICOVAGINAL ABDOMINAL, CYSTOSCOPY N/A 2017    Procedure: DAVINCI ASSISTED VESICOVAGINAL ABDOMINAL, CYSTOSCOPY;  Surgeon: Aubrey Boyd MD;  Location: [facility]     HC ENLARGE BREAST WITH IMPLANT       HYSTERECTOMY TOTAL ABDOMINAL, SALPINGECTOMY N/A 2016    Procedure: HYSTERECTOMY TOTAL ABDOMINAL, SALPINGECTOMY;  Surgeon: Abebe Michelle MD;  Location:  OR     HYSTERECTOMY, PAP NO LONGER INDICATED       LAPAROSCOPIC ASSISTED HYSTERECTOMY VAGINAL N/A 2016    Procedure: LAPAROSCOPIC ASSISTED HYSTERECTOMY VAGINAL;  Surgeon: Abebe Michelle MD;  Location:  OR     LAPAROSCOPIC SALPINGECTOMY Bilateral 2016    Procedure: LAPAROSCOPIC SALPINGECTOMY;  Surgeon: Abebe Michelle MD;  Location:  OR     REPAIR BLADDER N/A 2016    Procedure: REPAIR BLADDER;  Surgeon: Abebe Michelle MD;  Location:  OR     wisdom teeth             Anesthesia Evaluation     . Pt has had prior anesthetic. Type: General    No history of anesthetic complications          ROS/MED HX    ENT/Pulmonary:     (+)Mild Persistent asthma , . .    Neurologic:  - neg neurologic ROS     Cardiovascular:  - neg cardiovascular ROS       METS/Exercise Tolerance:  4 - Raking leaves, gardening   Hematologic:  - neg hematologic  ROS       Musculoskeletal:  - neg musculoskeletal ROS       GI/Hepatic:  - neg GI/hepatic ROS       Renal/Genitourinary:  - ROS Renal section negative       Endo:  - neg endo ROS       Psychiatric:  - neg psychiatric ROS       Infectious Disease:  - neg infectious disease ROS       Malignancy:         Other:                         PHYSICAL EXAM:   Mental Status/Neuro: A/A/O   Airway: Facies: Feasible  Mallampati: I  Mouth/Opening: Full  TM distance: > 6 cm  Neck ROM: Full   Respiratory: Auscultation: CTAB     Resp. Rate: Normal     Resp. Effort: Normal      CV: Rhythm: Regular  Rate: Age appropriate  Heart: Normal Sounds  Edema: None   Comments:      Dental: Normal Dentition                LABS:  CBC:   Lab Results   Component Value Date    WBC 6.4 02/13/2018    WBC 5.0 01/31/2017    HGB 14.7 02/13/2018    HGB 12.7 01/31/2017    HCT 44.6 02/13/2018    HCT 38.9 01/31/2017     02/13/2018     01/31/2017     BMP:   Lab Results   Component Value Date     02/13/2018     01/31/2017    POTASSIUM 4.1 02/13/2018    POTASSIUM 4.1 01/31/2017    CHLORIDE 104 02/13/2018    CHLORIDE 106 01/31/2017    CO2 27 02/13/2018    CO2 27 01/31/2017    BUN 10 02/13/2018    BUN 11 01/31/2017    CR 0.70 02/13/2018    CR 0.75 01/31/2017     (H) 02/13/2018    GLC 98 01/31/2017     COAGS: No results found for: PTT, INR, FIBR  POC:   Lab Results   Component Value Date     (H) 12/28/2016    HCG Negative 12/27/2016     OTHER:   Lab Results   Component Value Date    RAÚL 9.0 02/13/2018    ALBUMIN 4.1 05/14/2015    PROTTOTAL 7.5 05/14/2015    ALT  "46 08/12/2015    AST 40 05/14/2015    GGT 74 (H) 05/14/2015    ALKPHOS 64 05/14/2015    BILITOTAL 0.5 05/14/2015        Preop Vitals    BP Readings from Last 3 Encounters:   11/05/19 137/86   10/31/19 128/82   09/16/19 (!) 141/92    Pulse Readings from Last 3 Encounters:   11/05/19 62   10/31/19 84   09/16/19 89      Resp Readings from Last 3 Encounters:   11/05/19 16   02/26/18 16   02/14/17 16    SpO2 Readings from Last 3 Encounters:   11/05/19 95%   10/31/19 100%   09/16/19 97%      Temp Readings from Last 1 Encounters:   11/05/19 36.6  C (97.8  F) (Oral)    Ht Readings from Last 1 Encounters:   11/05/19 1.715 m (5' 7.5\")      Wt Readings from Last 1 Encounters:   11/05/19 95.3 kg (210 lb)    Estimated body mass index is 32.41 kg/m  as calculated from the following:    Height as of this encounter: 1.715 m (5' 7.5\").    Weight as of this encounter: 95.3 kg (210 lb).     LDA:  Peripheral IV 11/05/19 Left Hand (Active)   Site Assessment WDL 11/5/2019  8:23 AM   Line Status Infusing 11/5/2019  8:23 AM   Phlebitis Scale 0-->no symptoms 11/5/2019  8:23 AM   Infiltration Scale 0 11/5/2019  8:23 AM   Number of days: 0       Urethral Catheter Latex;Straight-tip 16 fr (Active)   Number of days: 1043       Urethral Catheter Non-latex;Double-lumen;Straight-tip 16 fr (Active)   Number of days: 994        Assessment:   ASA SCORE: 2    H&P: History and physical reviewed and following examination; no interval change.   Smoking Status:  Non-Smoker/Unknown   NPO Status: NPO Appropriate     Plan:   Anes. Type:  MAC   Pre-Medication: None   Induction:  N/a   Airway: Native Airway   Access/Monitoring: PIV   Maintenance: N/a     Postop Plan:   Postop Pain: None  Postop Sedation/Airway: Not planned  Disposition: Outpatient     PONV Management:   Adult Risk Factors: Female, Non-Smoker   Prevention: Ondansetron, Dexamethasone     CONSENT: Direct conversation   Plan and risks discussed with: Patient                      Partha Thurman " MD Mitzi, MD

## 2019-11-11 ENCOUNTER — TELEPHONE (OUTPATIENT)
Dept: UROLOGY | Facility: CLINIC | Age: 49
End: 2019-11-11

## 2019-11-11 ENCOUNTER — PRE VISIT (OUTPATIENT)
Dept: UROLOGY | Facility: CLINIC | Age: 49
End: 2019-11-11

## 2019-11-11 ENCOUNTER — OFFICE VISIT (OUTPATIENT)
Dept: UROLOGY | Facility: CLINIC | Age: 49
End: 2019-11-11
Payer: COMMERCIAL

## 2019-11-11 VITALS
SYSTOLIC BLOOD PRESSURE: 159 MMHG | DIASTOLIC BLOOD PRESSURE: 92 MMHG | WEIGHT: 215.1 LBS | OXYGEN SATURATION: 100 % | BODY MASS INDEX: 33.19 KG/M2 | HEART RATE: 85 BPM

## 2019-11-11 DIAGNOSIS — R39.15 URINARY URGENCY: ICD-10-CM

## 2019-11-11 DIAGNOSIS — N32.81 OVERACTIVE BLADDER: Primary | ICD-10-CM

## 2019-11-11 DIAGNOSIS — N39.41 URGE INCONTINENCE OF URINE: ICD-10-CM

## 2019-11-11 PROCEDURE — 99214 OFFICE O/P EST MOD 30 MIN: CPT | Performed by: UROLOGY

## 2019-11-11 RX ORDER — CEFAZOLIN SODIUM 1 G
1 VIAL (EA) INJECTION SEE ADMIN INSTRUCTIONS
Status: CANCELLED | OUTPATIENT
Start: 2019-11-11

## 2019-11-11 NOTE — PROGRESS NOTES
Reason for Visit:  F/u on urinary symptoms.    Clinical Data: Ms. Crista Ma is a 49 year old female with a hx of vesicovaginal fistula s/p robot-assisted repair and midurethral sling on 2/14/17.  She had been having some urinary urgency and urge incontinence despite being on Detrol so it was switched to Myrbetriq in August of 2017 which helped somewhat.  She was also given referral to PT in oct 2017.    She also underwent periurethral bulking on 2/26/18 but does not feel like it helped.  She feels the symptoms have gotten worse.    She finally underwent a PNE on 11/5/19 and noticed about 20% improvement in her symptoms.      BP (!) 159/92 (BP Location: Left arm, Patient Position: Sitting, Cuff Size: Adult Regular)   Pulse 85   Wt 97.6 kg (215 lb 1.6 oz)   LMP 12/06/2016 (Exact Date)   SpO2 100%   BMI 33.19 kg/m    NAD  A/O X 3   skin warm  Moving all extremities    Cystoscopy in October of 2017 showed a small amount of cystitis cystica and so was started on methenamine and vit C in oct of 2017 but she did not take it reporting that she did not want to be on any other medication and has not had any infections since then.    She tried the PT but did not feel like it helped.  Her biggest problem is when she sits up from a sitting position, then she just gushes out.      UDS on 8/30/17:    VOIDING DIARY:  Voids anywhere from 10-20 times per day, nocturia x 2-3.  Episodes of incontinence: numerous per day and night.  Incontinence associated with: strong urge, coughing, sneezing, standing, without awareness, while sleeping.  Total Volume Intake: 2250 mL of water day, 2 cups of coffee or tea in the morning  Total Volume Output: unknown    UROFLOWMETRY:  Voided volume: 73 mL.  Maximum flow rate: 11.1 mL/sec.  Average flow rate: 5.4 mL/sec.  Postvoid residual by catheter: 80 mL.  Character of the curve: bell-shaped curve.    DURING THE FILLING PHASE:  First sensation: 248 mL.  First Desire: 248 mL.  Strong Desire:  347 mL.  Maximum Capacity: 477 mL.     Uninhibited detrusor contractions: no appreciable DO/UDC's  Compliance: Good. PDet=6 cmH20 at capacity. Compliance ratio of 80.  Continence: no stress leak at Pabd 117 cm H2O at a volume of 413 mL.  EMG: concordant during filling.     DURING THE VOIDING PHASE:  Maximum detrusor contraction with void: 47.4 cm of H2O pressure.  Voided volume: 477 mL.  Maximum flow rate: 27.7 mL/sec.  Average flow rate: 8.8 mL/sec.  Detrusor sphincter dyssynergia: some increased EMG activity during voiding, but suspect this represents movement or dysfunctional voiding rather than true DSD.  Character of voiding curve: bell-shaped curve.  BOOI: -18.9 (suggesting no obstruction - see key below)  [obstructed ([place] index [BOOI] ? 40); equivocal (no definite   obstruction; BOOI 20-40); and no obstruction (BOOI ? 20)]     FLUOROSCOPIC IMAGING OF THE BLADDER DURING URODYNAMICS:  Fluoroscopy during today's procedure demonstrated a smooth walled bladder without diverticulae or cellules.  No vesicoureteral reflux was observed.  The bladder neck was closed during filling and open during voiding.     A/P:  47 year old female s/p VVF repair with some urgency and incontinence possibly related to stress induced urge incontinence and intrinsic sphincter deficiency.  Her UDS results did not demonstrate bladder overactivity or incontinence despite volumes close to 500 cc but these were in a sitting position which she reports she does not have a problem with this.  We discussed trying first stage Interstim, vs. Botox.  She would like to try botox  -schedule botox 100 mg    Thank you for allowing me to participate in the care of  Ms. Crista Ma and I will keep you updated on her progress.    Aubrey Boyd MD    25 min spent with patient,  >50% in discussion and coordination of care.

## 2019-11-11 NOTE — TELEPHONE ENCOUNTER
Reason for Visit: post operative check up S/P PNE    Diagnosis: OAB    Orders/Procedures/Records: in system    Contact Patient: n/a    Rooming Requirements: PNE removal, ask what side worked best, ask what % improvement of symptoms      Socorro Woodson LPN  11/11/19  10:38 AM

## 2019-11-11 NOTE — NURSING NOTE
Crista Ma's goals for this visit include:   Chief Complaint   Patient presents with     Post-op Visit     11/05 sacral neurostimulator insertion       She requests these members of her care team be copied on today's visit information:     PCP: No Primary Care Provider     Referring Provider:  No referring provider defined for this encounter.    BP (!) 159/92 (BP Location: Left arm, Patient Position: Sitting, Cuff Size: Adult Regular)   Pulse 85   Wt 97.6 kg (215 lb 1.6 oz)   LMP 12/06/2016 (Exact Date)   SpO2 100%   BMI 33.19 kg/m      Do you need any medication refills at today's visit? No

## 2019-11-11 NOTE — TELEPHONE ENCOUNTER
Dr. Boyd placed the following Case Request: CYSTOSCOPY, INJECT BOTOX (look in the bladder and inject botox)    Message sent to surgery scheduler to assist with surgery scheduling.    Florina Win RN, BSN

## 2019-11-12 PROBLEM — N32.81 OVERACTIVE BLADDER: Status: ACTIVE | Noted: 2019-11-12

## 2019-11-12 NOTE — TELEPHONE ENCOUNTER
Date Scheduled: 12-16-19  Facility: [facility]  Surgeon: Dr. Boyd   Post-op appointment scheduled:    scheduled?: No  Surgery packet/instructions confirmed received?  Yes  Special Considerations:   Fidelia Guzman, Surgery Scheduling Coordinator

## 2019-12-03 ENCOUNTER — OFFICE VISIT (OUTPATIENT)
Dept: PEDIATRICS | Facility: CLINIC | Age: 49
End: 2019-12-03
Payer: COMMERCIAL

## 2019-12-03 VITALS
TEMPERATURE: 98.3 F | OXYGEN SATURATION: 100 % | HEART RATE: 99 BPM | DIASTOLIC BLOOD PRESSURE: 82 MMHG | WEIGHT: 213.8 LBS | HEIGHT: 67 IN | BODY MASS INDEX: 33.56 KG/M2 | SYSTOLIC BLOOD PRESSURE: 128 MMHG

## 2019-12-03 DIAGNOSIS — J45.30 MILD PERSISTENT ASTHMA WITHOUT COMPLICATION: ICD-10-CM

## 2019-12-03 DIAGNOSIS — N39.41 URGE INCONTINENCE OF URINE: ICD-10-CM

## 2019-12-03 DIAGNOSIS — N39.3 FEMALE STRESS INCONTINENCE: ICD-10-CM

## 2019-12-03 DIAGNOSIS — Z01.818 PREOP GENERAL PHYSICAL EXAM: Primary | ICD-10-CM

## 2019-12-03 PROCEDURE — 99214 OFFICE O/P EST MOD 30 MIN: CPT | Performed by: INTERNAL MEDICINE

## 2019-12-03 ASSESSMENT — MIFFLIN-ST. JEOR: SCORE: 1641.38

## 2019-12-03 NOTE — PROGRESS NOTES
[address] [address]  [phone]  Dept: [phone]    PRE-OP EVALUATION:  Today's date: 12/3/2019    Crista Ma (: 1970) presents for pre-operative evaluation assessment as requested by Dr. Aubrey Boyd .  She requires evaluation and anesthesia risk assessment prior to undergoing surgery/procedure for treatment of CYSTOSCOPY, INJECT BOTOX (look in the bladder and inject botox) .    Proposed Surgery/ Procedure: CYSTOSCOPY, INJECT BOTOX (look in the bladder and inject botox)  Date of Surgery/ Procedure: 19  Time of Surgery/ Procedure: 11:45 pm  Hospital/Surgical Facility: [facility]  Available Electronically  Primary Physician: Aubrey Boyd  Type of Anesthesia Anticipated: General    Patient has a Health Care Directive or Living Will:  NO    1. NO - Do you have a history of heart attack, stroke, stent, bypass or surgery on an artery in the head, neck, heart or legs?  2. NO - Do you ever have any pain or discomfort in your chest?  3. NO - Do you have a history of  Heart Failure?  4. NO - Are you troubled by shortness of breath when: walking on the level, up a slight hill or at night?  5. NO - Do you currently have a cold, bronchitis or other respiratory infection?  6. NO - Do you have a cough, shortness of breath or wheezing?  7. NO - Do you sometimes get pains in the calves of your legs when you walk?  8. NO - Do you or anyone in your family have previous history of blood clots?  9. NO - Do you or does anyone in your family have a serious bleeding problem such as prolonged bleeding following surgeries or cuts?  10. NO - Have you ever had problems with anemia or been told to take iron pills?  11. NO - Have you had any abnormal blood loss such as black, tarry or bloody stools, or abnormal vaginal bleeding?  12. NO - Have you ever had a blood transfusion?  13. NO - Have you or any of your relatives ever had problems with  anesthesia?  14. NO - Do you have sleep apnea, excessive snoring or daytime drowsiness?  15. NO - Do you have any prosthetic heart valves?  16. NO - Do you have prosthetic joints?  17. NO - Is there any chance that you may be pregnant?      HPI:     HPI related to upcoming procedure:     49-year-old young lady with previous history of vesicovaginal fistula repair on 2/14/2017 he has had issues with urinary urge and stress incontinence.  She is gone through various treatments including Detrol, Myrbetriq, physical therapy, periurethral bulking and more recently insertion of neurostimulator.  None of them have helped much.  She is now to undergo Botox instillation.    She has mild asthma which has been stable.  She uses her inhalers and Singulair regularly.  No recent exacerbation.      MEDICAL HISTORY:     Patient Active Problem List    Diagnosis Date Noted     Overactive bladder 11/12/2019     Priority: Medium     Added automatically from request for surgery [id number]       Urgency incontinence 10/11/2019     Priority: Medium     Added automatically from request for surgery [id number]       Urinary urgency 10/11/2019     Priority: Medium     Added automatically from request for surgery [id number]       Intrinsic sphincter deficiency 01/11/2018     Priority: Medium     Urge incontinence of urine 01/11/2018     Priority: Medium     Female stress incontinence 05/01/2017     Priority: Medium     VVF (vesicovaginal fistula) 01/09/2017     Priority: Medium     Post-op pain 12/27/2016     Priority: Medium     Menorrhagia 12/20/2016     Priority: Medium     Dysmenorrhea 12/20/2016     Priority: Medium     HARRY (stress urinary incontinence, female) 12/20/2016     Priority: Medium     Mild persistent asthma without complication 05/14/2015     Priority: Medium      Past Medical History:   Diagnosis Date     Allergic rhinitis, cause unspecified      Dysmenorrhea      Pneumothorax     spontaneous x 4-as a child r/t pneumonia     Seasonal  affective disorder (H)     prozac      Unspecified asthma(493.90)     on singulair, advair     Past Surgical History:   Procedure Laterality Date     CYSTOSCOPY N/A 12/27/2016    Procedure: CYSTOSCOPY;  Surgeon: Abebe Michelle MD;  Location: [facility]     CYSTOSCOPY, INJECT COLLAGEN, COMBINED N/A 2/26/2018    Procedure: COMBINED CYSTOSCOPY, INJECT BULKING AGENT;  Cystscopy and periurethral bulking;  Surgeon: Aubrey Boyd MD;  Location: [facility]     DAVINCI ASSISTED VESICOVAGINAL ABDOMINAL, CYSTOSCOPY N/A 2/14/2017    Procedure: DAVINCI ASSISTED VESICOVAGINAL ABDOMINAL, CYSTOSCOPY;  Surgeon: Aubrey Boyd MD;  [facility]     HC ENLARGE BREAST WITH IMPLANT       HYSTERECTOMY TOTAL ABDOMINAL, SALPINGECTOMY N/A 12/27/2016    Procedure: HYSTERECTOMY TOTAL ABDOMINAL, SALPINGECTOMY;  Surgeon: Abebe Michelle MD;  Location: [facility]     HYSTERECTOMY, PAP NO LONGER INDICATED       IMPLANT STIMULATOR SACRAL NERVE PERCUTANEOUS TRIAL N/A 11/5/2019    Procedure: INSERTION, NEUROSTIMULATOR, SACRAL, PERCUTANEOUS, FOR TRIAL;  Surgeon: Aubrey Boyd MD;  [facility]     LAPAROSCOPIC ASSISTED HYSTERECTOMY VAGINAL N/A 12/27/2016    Procedure: LAPAROSCOPIC ASSISTED HYSTERECTOMY VAGINAL;  Surgeon: Abebe Michelle MD;  [facility]     LAPAROSCOPIC SALPINGECTOMY Bilateral 12/27/2016    Procedure: LAPAROSCOPIC SALPINGECTOMY;  Surgeon: Abebe Michelle MD;  [facility]     REPAIR BLADDER N/A 12/27/2016    Procedure: REPAIR BLADDER;  Surgeon: Abebe Michelle MD;  Location: [facility]     wisdom teeth       Current Outpatient Medications   Medication Sig Dispense Refill     ALBUTEROL 108 (90 BASE) MCG/ACT inhaler Inhale 1-2 puffs into the lungs every 4 hours as needed   3     budesonide-formoterol (SYMBICORT) 160-4.5 MCG/ACT Inhaler Inhale 1-2 puffs into the lungs 2 times daily       Montelukast Sodium (SINGULAIR PO) Take 10 mg by  "mouth At Bedtime       SENNA-docusate sodium (SENNA S) 8.6-50 MG tablet Take 1 tablet by mouth nightly as needed (constipation) 20 tablet 0     OTC products: None, except as noted above    Allergies   Allergen Reactions     No Known Drug Allergies       Latex Allergy: NO    Social History     Tobacco Use     Smoking status: Never Smoker     Smokeless tobacco: Never Used     Tobacco comment: 1/2 pk/wk-social   Substance Use Topics     Alcohol use: Yes     Alcohol/week: 10.0 standard drinks     Types: 10 Standard drinks or equivalent per week     Comment: 4-10 a wk     History   Drug Use No       REVIEW OF SYSTEMS:   Constitutional, neuro, ENT, endocrine, pulmonary, cardiac, gastrointestinal, genitourinary, musculoskeletal, integument and psychiatric systems are negative, except as otherwise noted.    EXAM:   /82 (BP Location: Right arm, Patient Position: Sitting, Cuff Size: Adult Regular)   Pulse 99   Temp 98.3  F (36.8  C) (Temporal)   Ht 1.708 m (5' 7.25\")   Wt 97 kg (213 lb 12.8 oz)   LMP 12/06/2016 (Exact Date)   SpO2 100%   BMI 33.24 kg/m      GENERAL APPEARANCE: healthy, alert and no distress     EYES: EOMI,  PERRL     HENT: ear canals and TM's normal and nose and mouth without ulcers or lesions     NECK: no adenopathy, no asymmetry, masses, or scars and thyroid normal to palpation     RESP: lungs clear to auscultation - no rales, rhonchi or wheezes     CV: regular rates and rhythm, normal S1 S2, no S3 or S4 and no murmur, click or rub     ABDOMEN:  soft, nontender, no HSM or masses and bowel sounds normal     MS: extremities normal- no gross deformities noted, no evidence of inflammation in joints, FROM in all extremities.     SKIN: no suspicious lesions or rashes     NEURO: Normal strength and tone, sensory exam grossly normal, mentation intact and speech normal     PSYCH: mentation appears normal. and affect normal/bright     LYMPHATICS: No cervical adenopathy    DIAGNOSTICS:   No labs or EKG " required for low risk surgery (cataract, skin procedure, breast biopsy, etc)    Recent Labs   Lab Test 02/13/18  1156 01/31/17  0739   HGB 14.7 12.7    301    141   POTASSIUM 4.1 4.1   CR 0.70 0.75        IMPRESSION:   Diagnosis/reason for consult:  1. Preop physical examination completed.  Patient is stable in good health.  2.  Urge incontinence related to be VVF repair.  Patient to undergo above-mentioned treatment.  3.  Female stress incontinence also related to VVF prepare.   to undergo Botox  installation.  4.  Mild persistent asthma without complication.  Patient quite stable on Symbicort, Singulair and as needed albuterol.    The proposed surgical procedure is considered LOW risk.    REVISED CARDIAC RISK INDEX  The patient has the following serious cardiovascular risks for perioperative complications such as (MI, PE, VFib and 3  AV Block):  No serious cardiac risks  INTERPRETATION: 0 risks: Class I (very low risk - 0.4% complication rate)    The patient has the following additional risks for perioperative complications:  No identified additional risks      ICD-10-CM    1. Preop general physical exam Z01.818    2. Urge incontinence of urine N39.41    3. Female stress incontinence N39.3    4. Mild persistent asthma without complication J45.30        RECOMMENDATIONS:     Patient is medically optimized to undergo the planned surgical procedure.    --Patient is to take all scheduled medications on the day of surgery EXCEPT for modifications listed below.    APPROVAL GIVEN to proceed with proposed procedure, without further diagnostic evaluation       Signed Electronically by: Jus Gomez MD    Copy of this evaluation report is provided to requesting physician.    [place] Preop Guidelines    Revised Cardiac Risk Index

## 2019-12-13 ENCOUNTER — ANESTHESIA EVENT (OUTPATIENT)
Dept: SURGERY | Facility: AMBULATORY SURGERY CENTER | Age: 49
End: 2019-12-13

## 2019-12-13 RX ORDER — SODIUM CHLORIDE, SODIUM LACTATE, POTASSIUM CHLORIDE, CALCIUM CHLORIDE 600; 310; 30; 20 MG/100ML; MG/100ML; MG/100ML; MG/100ML
INJECTION, SOLUTION INTRAVENOUS CONTINUOUS
Status: CANCELLED | OUTPATIENT
Start: 2019-12-13

## 2019-12-13 RX ORDER — MEPERIDINE HYDROCHLORIDE 25 MG/ML
12.5 INJECTION INTRAMUSCULAR; INTRAVENOUS; SUBCUTANEOUS
Status: CANCELLED | OUTPATIENT
Start: 2019-12-13

## 2019-12-13 RX ORDER — ONDANSETRON 2 MG/ML
4 INJECTION INTRAMUSCULAR; INTRAVENOUS EVERY 30 MIN PRN
Status: CANCELLED | OUTPATIENT
Start: 2019-12-13

## 2019-12-13 RX ORDER — OXYCODONE HYDROCHLORIDE 5 MG/1
5 TABLET ORAL EVERY 4 HOURS PRN
Status: CANCELLED | OUTPATIENT
Start: 2019-12-13

## 2019-12-13 RX ORDER — FENTANYL CITRATE 50 UG/ML
25-50 INJECTION, SOLUTION INTRAMUSCULAR; INTRAVENOUS
Status: CANCELLED | OUTPATIENT
Start: 2019-12-13

## 2019-12-13 RX ORDER — NALOXONE HYDROCHLORIDE 0.4 MG/ML
.1-.4 INJECTION, SOLUTION INTRAMUSCULAR; INTRAVENOUS; SUBCUTANEOUS
Status: CANCELLED | OUTPATIENT
Start: 2019-12-13 | End: 2019-12-14

## 2019-12-13 RX ORDER — ONDANSETRON 4 MG/1
4 TABLET, ORALLY DISINTEGRATING ORAL EVERY 30 MIN PRN
Status: CANCELLED | OUTPATIENT
Start: 2019-12-13

## 2019-12-16 ENCOUNTER — HOSPITAL ENCOUNTER (OUTPATIENT)
Facility: AMBULATORY SURGERY CENTER | Age: 49
Discharge: HOME | End: 2019-12-16
Attending: UROLOGY | Admitting: UROLOGY
Payer: COMMERCIAL

## 2019-12-16 ENCOUNTER — SURGERY (OUTPATIENT)
Age: 49
End: 2019-12-16
Payer: COMMERCIAL

## 2019-12-16 ENCOUNTER — ANESTHESIA (OUTPATIENT)
Dept: SURGERY | Facility: AMBULATORY SURGERY CENTER | Age: 49
End: 2019-12-16
Payer: COMMERCIAL

## 2019-12-16 VITALS
HEART RATE: 61 BPM | OXYGEN SATURATION: 97 % | TEMPERATURE: 96.5 F | RESPIRATION RATE: 16 BRPM | DIASTOLIC BLOOD PRESSURE: 68 MMHG | SYSTOLIC BLOOD PRESSURE: 129 MMHG

## 2019-12-16 DIAGNOSIS — R39.15 URINARY URGENCY: ICD-10-CM

## 2019-12-16 DIAGNOSIS — N32.81 OVERACTIVE BLADDER: ICD-10-CM

## 2019-12-16 DIAGNOSIS — N39.41 URGE INCONTINENCE OF URINE: ICD-10-CM

## 2019-12-16 PROCEDURE — 52287 CYSTOSCOPY CHEMODENERVATION: CPT | Performed by: UROLOGY

## 2019-12-16 PROCEDURE — 52287 CYSTOSCOPY CHEMODENERVATION: CPT

## 2019-12-16 PROCEDURE — G8907 PT DOC NO EVENTS ON DISCHARG: HCPCS

## 2019-12-16 PROCEDURE — G8916 PT W IV AB GIVEN ON TIME: HCPCS

## 2019-12-16 RX ORDER — PROPOFOL 10 MG/ML
INJECTION, EMULSION INTRAVENOUS PRN
Status: DISCONTINUED | OUTPATIENT
Start: 2019-12-16 | End: 2019-12-16

## 2019-12-16 RX ORDER — SODIUM CHLORIDE, SODIUM LACTATE, POTASSIUM CHLORIDE, CALCIUM CHLORIDE 600; 310; 30; 20 MG/100ML; MG/100ML; MG/100ML; MG/100ML
INJECTION, SOLUTION INTRAVENOUS CONTINUOUS
Status: DISCONTINUED | OUTPATIENT
Start: 2019-12-16 | End: 2019-12-17 | Stop reason: HOSPADM

## 2019-12-16 RX ORDER — FENTANYL CITRATE 50 UG/ML
INJECTION, SOLUTION INTRAMUSCULAR; INTRAVENOUS PRN
Status: DISCONTINUED | OUTPATIENT
Start: 2019-12-16 | End: 2019-12-16

## 2019-12-16 RX ORDER — ONDANSETRON 2 MG/ML
INJECTION INTRAMUSCULAR; INTRAVENOUS PRN
Status: DISCONTINUED | OUTPATIENT
Start: 2019-12-16 | End: 2019-12-16

## 2019-12-16 RX ORDER — LIDOCAINE HYDROCHLORIDE 20 MG/ML
INJECTION, SOLUTION INFILTRATION; PERINEURAL PRN
Status: DISCONTINUED | OUTPATIENT
Start: 2019-12-16 | End: 2019-12-16

## 2019-12-16 RX ORDER — ACETAMINOPHEN 325 MG/1
975 TABLET ORAL ONCE
Status: COMPLETED | OUTPATIENT
Start: 2019-12-16 | End: 2019-12-16

## 2019-12-16 RX ORDER — CEFAZOLIN SODIUM 1 G/3ML
1 INJECTION, POWDER, FOR SOLUTION INTRAMUSCULAR; INTRAVENOUS SEE ADMIN INSTRUCTIONS
Status: DISCONTINUED | OUTPATIENT
Start: 2019-12-16 | End: 2019-12-17 | Stop reason: HOSPADM

## 2019-12-16 RX ORDER — CEFAZOLIN SODIUM 2 G/100ML
2 INJECTION, SOLUTION INTRAVENOUS
Status: COMPLETED | OUTPATIENT
Start: 2019-12-16 | End: 2019-12-16

## 2019-12-16 RX ORDER — HYDROCODONE BITARTRATE AND ACETAMINOPHEN 5; 325 MG/1; MG/1
1 TABLET ORAL ONCE
Status: CANCELLED | OUTPATIENT
Start: 2019-12-16 | End: 2019-12-16

## 2019-12-16 RX ORDER — DEXAMETHASONE SODIUM PHOSPHATE 4 MG/ML
INJECTION, SOLUTION INTRA-ARTICULAR; INTRALESIONAL; INTRAMUSCULAR; INTRAVENOUS; SOFT TISSUE PRN
Status: DISCONTINUED | OUTPATIENT
Start: 2019-12-16 | End: 2019-12-16

## 2019-12-16 RX ORDER — LIDOCAINE 40 MG/G
CREAM TOPICAL
Status: DISCONTINUED | OUTPATIENT
Start: 2019-12-16 | End: 2019-12-17 | Stop reason: HOSPADM

## 2019-12-16 RX ADMIN — ONDANSETRON 4 MG: 2 INJECTION INTRAMUSCULAR; INTRAVENOUS at 13:22

## 2019-12-16 RX ADMIN — FENTANYL CITRATE 50 MCG: 50 INJECTION, SOLUTION INTRAMUSCULAR; INTRAVENOUS at 12:57

## 2019-12-16 RX ADMIN — PROPOFOL 60 MG: 10 INJECTION, EMULSION INTRAVENOUS at 13:03

## 2019-12-16 RX ADMIN — LIDOCAINE HYDROCHLORIDE 100 MG: 20 INJECTION, SOLUTION INFILTRATION; PERINEURAL at 13:04

## 2019-12-16 RX ADMIN — CEFAZOLIN SODIUM 2 G: 2 INJECTION, SOLUTION INTRAVENOUS at 12:57

## 2019-12-16 RX ADMIN — SODIUM CHLORIDE, SODIUM LACTATE, POTASSIUM CHLORIDE, CALCIUM CHLORIDE: 600; 310; 30; 20 INJECTION, SOLUTION INTRAVENOUS at 12:57

## 2019-12-16 RX ADMIN — PROPOFOL 60 MG: 10 INJECTION, EMULSION INTRAVENOUS at 13:04

## 2019-12-16 RX ADMIN — ACETAMINOPHEN 975 MG: 325 TABLET ORAL at 12:24

## 2019-12-16 RX ADMIN — FENTANYL CITRATE 50 MCG: 50 INJECTION, SOLUTION INTRAMUSCULAR; INTRAVENOUS at 13:05

## 2019-12-16 RX ADMIN — DEXAMETHASONE SODIUM PHOSPHATE 4 MG: 4 INJECTION, SOLUTION INTRA-ARTICULAR; INTRALESIONAL; INTRAMUSCULAR; INTRAVENOUS; SOFT TISSUE at 13:22

## 2019-12-16 NOTE — OP NOTE
Urology Operative Summary    Pre-operative diagnosis: overactive bladder   Post-operative diagnosis: Same   Procedure: Cystourethroscopy with injection of botulinum toxin A     Surgeon: Aubrey Boyd MD   Assistant(s): none      Anesthesia: MAC       Estimated blood loss: Less than 10 ml     Complications: None   Condition: Patient taken to recovery in stable condition.     Indications: Crista Ma is a 49 year old female  with overactive bladderrefractory to anti-cholinergics. she understands the risks and benefits of the various options and elects to proceed with botulinum toxin injection understanding the risks for urinary obstruction, infection, pain, bleeding, need for future procedures and risks of anesthesia.    Procedure: Crista Ma was taken to the operating room in her  usual state of health.   she was positioned in Patient Positioning: Lithotomy.  her genitalia were prepped and draped in the standard fashion. A time-out was performed to ensure proper patient, procedure and positioning.  The patient received appropriate IV antibiotics prior to the procedure based on pre-operative urine culture.    The procedure was initiated with insertion of a rigid cystoscope. The bladder mucosa appeared to be normal without stones, tumors or diverticulum on 360 degree inspection. Cysto findings included:normal mucosa but at the posterior floor were two vertical bands that could be limiting ability of bladder to expand.  The botox was focused in this area but also injected everywhere else.    We mixed 1 vials of 100 U botulinum toxin A into 20 cc's of injectable saline for a total of 100 U.      We then emptied the bladder to re-inspect our injection sites and found that there was a minimal amount of blood. The patient was returned to supine position and transported to recovery in stable condition.    Plan: f/u in 1 month to reassess    Aubrey Boyd MD  December 16, 2019

## 2019-12-16 NOTE — ANESTHESIA PREPROCEDURE EVALUATION
Anesthesia Pre-Procedure Evaluation    Patient: Crista Ma   MRN:     [id number] Gender:   female   Age:    49 year old :      1970        Preoperative Diagnosis: Overactive bladder [N32.81]  Urinary urgency [R39.15]  Urge incontinence of urine [N39.41]   Procedure(s):  CYSTOSCOPY, INJECT BOTOX (look in the bladder and inject botox)     Past Medical History:   Diagnosis Date     Allergic rhinitis, cause unspecified      Dysmenorrhea      Pneumothorax     spontaneous x 4-as a child r/t pneumonia     Seasonal affective disorder (H)     prozac      Unspecified asthma(493.90)     on singulair, advair      Past Surgical History:   Procedure Laterality Date     CYSTOSCOPY N/A 2016    Procedure: CYSTOSCOPY;  Surgeon: Abebe Michelle MD;  [facility]     CYSTOSCOPY, INJECT COLLAGEN, COMBINED N/A 2018    Procedure: COMBINED CYSTOSCOPY, INJECT BULKING AGENT;  Cystscopy and periurethral bulking;  Surgeon: Aubrey Boyd MD;  Location: [facility]     DAVINCI ASSISTED VESICOVAGINAL ABDOMINAL, CYSTOSCOPY N/A 2017    Procedure: DAVINCI ASSISTED VESICOVAGINAL ABDOMINAL, CYSTOSCOPY;  Surgeon: Aubrey Boyd MD;  Location: [facility]     HC ENLARGE BREAST WITH IMPLANT       HYSTERECTOMY TOTAL ABDOMINAL, SALPINGECTOMY N/A 2016    Procedure: HYSTERECTOMY TOTAL ABDOMINAL, SALPINGECTOMY;  Surgeon: Abebe Michelle MD;  [facility]     HYSTERECTOMY, PAP NO LONGER INDICATED       IMPLANT STIMULATOR SACRAL NERVE PERCUTANEOUS TRIAL N/A 2019    Procedure: INSERTION, NEUROSTIMULATOR, SACRAL, PERCUTANEOUS, FOR TRIAL;  Surgeon: Aubrey Boyd MD;  [facility]     LAPAROSCOPIC ASSISTED HYSTERECTOMY VAGINAL N/A 2016    Procedure: LAPAROSCOPIC ASSISTED HYSTERECTOMY VAGINAL;  Surgeon: Abebe Michelle MD;  [facility]     LAPAROSCOPIC SALPINGECTOMY Bilateral 2016    Procedure: LAPAROSCOPIC SALPINGECTOMY;  Surgeon: Viviana  Abebe Tripathi MD;  Location: [facility]     REPAIR BLADDER N/A 12/27/2016    Procedure: REPAIR BLADDER;  Surgeon: Abebe Michelle MD;  Location: [facility]     wisdom teeth            Anesthesia Evaluation     .             ROS/MED HX    ENT/Pulmonary:  - neg pulmonary ROS   (+)allergic rhinitis, asthma , . .    Neurologic:  - neg neurologic ROS     Cardiovascular:  - neg cardiovascular ROS       METS/Exercise Tolerance:     Hematologic:  - neg hematologic  ROS       Musculoskeletal:  - neg musculoskeletal ROS       GI/Hepatic:  - neg GI/hepatic ROS       Renal/Genitourinary:  - ROS Renal section negative       Endo:  - neg endo ROS       Psychiatric:  - neg psychiatric ROS       Infectious Disease:  - neg infectious disease ROS       Malignancy:      - no malignancy   Other:    - neg other ROS                     PHYSICAL EXAM:   Mental Status/Neuro: A/A/O   Airway: Facies: Feasible  Mallampati: I  Mouth/Opening: Full  TM distance: > 6 cm  Neck ROM: Full   Respiratory: Auscultation: CTAB     Resp. Rate: Normal     Resp. Effort: Normal      CV: Rhythm: Regular  Rate: Age appropriate  Heart: Normal Sounds  Edema: None   Comments:      Dental: Normal Dentition                LABS:  CBC:   Lab Results   Component Value Date    WBC 6.4 02/13/2018    WBC 5.0 01/31/2017    HGB 14.7 02/13/2018    HGB 12.7 01/31/2017    HCT 44.6 02/13/2018    HCT 38.9 01/31/2017     02/13/2018     01/31/2017     BMP:   Lab Results   Component Value Date     02/13/2018     01/31/2017    POTASSIUM 4.1 02/13/2018    POTASSIUM 4.1 01/31/2017    CHLORIDE 104 02/13/2018    CHLORIDE 106 01/31/2017    CO2 27 02/13/2018    CO2 27 01/31/2017    BUN 10 02/13/2018    BUN 11 01/31/2017    CR 0.70 02/13/2018    CR 0.75 01/31/2017     (H) 02/13/2018    GLC 98 01/31/2017     COAGS: No results found for: PTT, INR, FIBR  POC:   Lab Results   Component Value Date     (H) 12/28/2016    HCG Negative  "12/27/2016     OTHER:   Lab Results   Component Value Date    RAÚL 9.0 02/13/2018    ALBUMIN 4.1 05/14/2015    PROTTOTAL 7.5 05/14/2015    ALT 46 08/12/2015    AST 40 05/14/2015    GGT 74 (H) 05/14/2015    ALKPHOS 64 05/14/2015    BILITOTAL 0.5 05/14/2015        Preop Vitals    BP Readings from Last 3 Encounters:   12/16/19 (!) 140/86   12/03/19 128/82   11/11/19 (!) 159/92    Pulse Readings from Last 3 Encounters:   12/16/19 60   12/03/19 99   11/11/19 85      Resp Readings from Last 3 Encounters:   12/16/19 18   11/05/19 16   02/26/18 16    SpO2 Readings from Last 3 Encounters:   12/16/19 100%   12/03/19 100%   11/11/19 100%      Temp Readings from Last 1 Encounters:   12/16/19 36.4  C (97.6  F) (Temporal)    Ht Readings from Last 1 Encounters:   12/03/19 1.708 m (5' 7.25\")      Wt Readings from Last 1 Encounters:   12/03/19 97 kg (213 lb 12.8 oz)    Estimated body mass index is 33.24 kg/m  as calculated from the following:    Height as of 12/3/19: 1.708 m (5' 7.25\").    Weight as of 12/3/19: 97 kg (213 lb 12.8 oz).     LDA:  Peripheral IV 12/16/19 Left Hand (Active)   Site Assessment WDL 12/16/2019 12:35 PM   Line Status Infusing 12/16/2019 12:35 PM   Phlebitis Scale 0-->no symptoms 12/16/2019 12:35 PM   Number of days: 0       Urethral Catheter Latex;Straight-tip 16 fr (Active)   Number of days: 1084       Urethral Catheter Non-latex;Double-lumen;Straight-tip 16 fr (Active)   Number of days: 1035        Assessment:   ASA SCORE: 1    H&P: History and physical reviewed and following examination; no interval change.   Smoking Status:  Non-Smoker/Unknown   NPO Status: NPO Appropriate     Plan:   Anes. Type:  MAC   Pre-Medication: None   Induction:  N/a   Airway: Native Airway   Access/Monitoring: PIV   Maintenance: Propofol Sedation     Postop Plan:   Postop Pain: None  Postop Sedation/Airway: Not planned  Disposition: Outpatient     PONV Management:   Adult Risk Factors: Female, Non-Smoker   Prevention: Ondansetron, " Propofol     CONSENT: Direct conversation   Plan and risks discussed with: Patient   Blood Products: Consent Deferred (Minimal Blood Loss)                   Chiki Joseph MD

## 2019-12-16 NOTE — DISCHARGE INSTRUCTIONS
[facility]  Same-Day Surgery   Adult Discharge Orders & Instructions   For 24 hours after surgery  1. Get plenty of rest.  A responsible adult must stay with you for at least 24 hours after you leave the hospital.   2. Do not drive or use heavy equipment.  If you have weakness or tingling, don't drive or use heavy equipment until this feeling goes away.  3. Do not drink alcohol.  4. Avoid strenuous or risky activities.  Ask for help when climbing stairs.   5. You may feel lightheaded.  IF so, sit for a few minutes before standing.  Have someone help you get up.   6. If you have nausea (feel sick to your stomach): Drink only clear liquids such as apple juice, ginger ale, broth or 7-Up.  Rest may also help.  Be sure to drink enough fluids.  Move to a regular diet as you feel able.  7. You may have a slight fever. Call the doctor if your fever is over 100 F (37.7 C) (taken under the tongue) or lasts longer than 24 hours.  8. You may have a dry mouth, a sore throat, muscle aches or trouble sleeping.  These should go away after 24 hours.  9. Do not make important or legal decisions.   Call your doctor for any of the followin.  Signs of infection (fever, growing tenderness at the surgery site, a large amount of drainage or bleeding, severe pain, foul-smelling drainage, redness, swelling).    2. It has been over 8 to 10 hours since surgery and you are still not able to urinate (pass water).    3.  Headache for over 24 hours.

## 2019-12-16 NOTE — ANESTHESIA POSTPROCEDURE EVALUATION
Anesthesia POST Procedure Evaluation    Patient: Crista Ma   MRN:     [id number] Gender:   female   Age:    49 year old :      1970        Preoperative Diagnosis: Overactive bladder [N32.81]  Urinary urgency [R39.15]  Urge incontinence of urine [N39.41]   Procedure(s):  CYSTOSCOPY, INJECT BOTOX   Postop Comments: No value filed.       Anesthesia Type:  Not documented  MAC    Reportable Event: NO     PAIN: Uncomplicated   Sign Out status: Comfortable, Well controlled pain     PONV: No PONV   Sign Out status:  No Nausea or Vomiting     Neuro/Psych: Uneventful perioperative course   Sign Out Status: Preoperative baseline; Age appropriate mentation     Airway/Resp.: Uneventful perioperative course   Sign Out Status: Non labored breathing, age appropriate RR; Resp. Status within EXPECTED Parameters     CV: Uneventful perioperative course   Sign Out status: Appropriate BP and perfusion indices; Appropriate HR/Rhythm     Disposition:   Sign Out in:  PACU  Disposition:  Phase II; Home  Recovery Course: Uneventful  Follow-Up: Not required           Last Anesthesia Record Vitals:  CRNA VITALS  2019 1301 - 2019 1401      2019             Pulse:  97    SpO2:  99 %          Last PACU Vitals:  Vitals Value Taken Time   /63 2019  1:33 PM   Temp 35.8  C (96.5  F) 2019  1:33 PM   Pulse     Resp 16 2019  1:33 PM   SpO2 95 % 2019  1:33 PM   Temp src     NIBP 118/69 2019  1:25 PM   Pulse 97 2019  1:30 PM   SpO2 99 % 2019  1:30 PM   Resp     Temp     Ht Rate     Temp 2           Electronically Signed By: Chiki Joseph MD, 2019, 2:56 PM

## 2019-12-16 NOTE — ANESTHESIA CARE TRANSFER NOTE
Patient: Crista Ma    Procedure(s):  CYSTOSCOPY, INJECT BOTOX    Diagnosis: Overactive bladder [N32.81]  Urinary urgency [R39.15]  Urge incontinence of urine [N39.41]  Diagnosis Additional Information: No value filed.    Anesthesia Type:   MAC     Note:  Anesthesia Care Transfer Notewriter    Vitals: (Last set prior to Anesthesia Care Transfer)    CRNA VITALS  12/16/2019 1301 - 12/16/2019 1335      12/16/2019             Pulse:  97    SpO2:  99 %                Electronically Signed By: TELMA Harris CRNA  December 16, 2019  1:35 PM

## 2019-12-19 ENCOUNTER — TELEPHONE (OUTPATIENT)
Dept: UROLOGY | Facility: CLINIC | Age: 49
End: 2019-12-19

## 2020-02-11 DIAGNOSIS — N39.0 RECURRENT UTI: Primary | ICD-10-CM

## 2020-02-11 DIAGNOSIS — Z00.00 ROUTINE GENERAL MEDICAL EXAMINATION AT A HEALTH CARE FACILITY: ICD-10-CM

## 2020-02-11 LAB
ALBUMIN SERPL-MCNC: 3.7 G/DL (ref 3.4–5)
ALP SERPL-CCNC: 65 U/L (ref 40–150)
ALT SERPL W P-5'-P-CCNC: 64 U/L (ref 0–50)
ANION GAP SERPL CALCULATED.3IONS-SCNC: 5 MMOL/L (ref 3–14)
AST SERPL W P-5'-P-CCNC: 44 U/L (ref 0–45)
BILIRUB SERPL-MCNC: 0.2 MG/DL (ref 0.2–1.3)
BUN SERPL-MCNC: 11 MG/DL (ref 7–30)
CALCIUM SERPL-MCNC: 9 MG/DL (ref 8.5–10.1)
CHLORIDE SERPL-SCNC: 106 MMOL/L (ref 94–109)
CO2 SERPL-SCNC: 27 MMOL/L (ref 20–32)
CREAT SERPL-MCNC: 0.7 MG/DL (ref 0.52–1.04)
ERYTHROCYTE [DISTWIDTH] IN BLOOD BY AUTOMATED COUNT: 12.9 % (ref 10–15)
GFR SERPL CREATININE-BSD FRML MDRD: >90 ML/MIN/{1.73_M2}
GLUCOSE SERPL-MCNC: 104 MG/DL (ref 70–99)
HCT VFR BLD AUTO: 42.2 % (ref 35–47)
HGB BLD-MCNC: 13.9 G/DL (ref 11.7–15.7)
MCH RBC QN AUTO: 32.5 PG (ref 26.5–33)
MCHC RBC AUTO-ENTMCNC: 32.9 G/DL (ref 31.5–36.5)
MCV RBC AUTO: 99 FL (ref 78–100)
PLATELET # BLD AUTO: 247 10E9/L (ref 150–450)
POTASSIUM SERPL-SCNC: 4.1 MMOL/L (ref 3.4–5.3)
PROT SERPL-MCNC: 7.3 G/DL (ref 6.8–8.8)
RBC # BLD AUTO: 4.28 10E12/L (ref 3.8–5.2)
SODIUM SERPL-SCNC: 138 MMOL/L (ref 133–144)
WBC # BLD AUTO: 5.7 10E9/L (ref 4–11)

## 2020-02-11 PROCEDURE — 80053 COMPREHEN METABOLIC PANEL: CPT | Performed by: INTERNAL MEDICINE

## 2020-02-11 PROCEDURE — 85027 COMPLETE CBC AUTOMATED: CPT | Performed by: INTERNAL MEDICINE

## 2020-02-11 PROCEDURE — 87389 HIV-1 AG W/HIV-1&-2 AB AG IA: CPT | Performed by: INTERNAL MEDICINE

## 2020-02-11 PROCEDURE — 87086 URINE CULTURE/COLONY COUNT: CPT | Performed by: UROLOGY

## 2020-02-11 PROCEDURE — 87088 URINE BACTERIA CULTURE: CPT | Performed by: UROLOGY

## 2020-02-11 PROCEDURE — 86803 HEPATITIS C AB TEST: CPT | Performed by: INTERNAL MEDICINE

## 2020-02-11 PROCEDURE — 87186 SC STD MICRODIL/AGAR DIL: CPT | Performed by: UROLOGY

## 2020-02-11 PROCEDURE — 36415 COLL VENOUS BLD VENIPUNCTURE: CPT | Performed by: INTERNAL MEDICINE

## 2020-02-12 ENCOUNTER — TELEPHONE (OUTPATIENT)
Dept: UROLOGY | Facility: CLINIC | Age: 50
End: 2020-02-12

## 2020-02-12 DIAGNOSIS — N39.0 RECURRENT UTI: Primary | ICD-10-CM

## 2020-02-12 LAB
HCV AB SERPL QL IA: NONREACTIVE
HIV 1+2 AB+HIV1 P24 AG SERPL QL IA: NONREACTIVE

## 2020-02-12 RX ORDER — NITROFURANTOIN 25; 75 MG/1; MG/1
100 CAPSULE ORAL 2 TIMES DAILY
Qty: 10 CAPSULE | Refills: 0 | Status: SHIPPED | OUTPATIENT
Start: 2020-02-12 | End: 2020-02-17

## 2020-02-12 NOTE — TELEPHONE ENCOUNTER
----- Message from Aubrey Boyd MD sent at 2/12/2020  2:39 PM CST -----  Please treat UTI   Thank you

## 2020-02-12 NOTE — TELEPHONE ENCOUNTER
Patient returned call to clinic and was ok with the plan in the last note. Patient would like Macrobid sent to [facility] in [place].     Prescription pended and sent to RN[facility] to sign.    Will watch for culture results and contact patient once those are available.    Rosita Herman LPN

## 2020-02-12 NOTE — TELEPHONE ENCOUNTER
Left generic voicemail for patient to return call to clinic. When patient returns call will relay the following information:    Per Dr. Boyd, clinic is to treat UTI. Once clinic finds out which pharmacy patient would like prescription sent to.        Macrobid BID for 5 days pended. Will have RN[facility] sign when patient returns call for pharmacy preference.      Culture is still in process. Will let patient know results when we receive them.    Rosita Herman LPN

## 2020-02-13 LAB
BACTERIA SPEC CULT: ABNORMAL
SPECIMEN SOURCE: ABNORMAL

## 2020-02-13 NOTE — TELEPHONE ENCOUNTER
Left generic voicemail for patient to return call to clinic. When patient returns call, will let patient know the UC results are back and the Macrobid that was sent to her pharmacy is appropriate to treat her UTI.    Rosita Herman LPN

## 2020-02-14 NOTE — TELEPHONE ENCOUNTER
Made second attempt to contact patient but voicemail box was full. When patient returns call from yesterday, will inform her that the UC came back and her Macrobid is appropriate to treat her UTI.    Rosita Herman LPN

## 2020-09-28 ENCOUNTER — VIRTUAL VISIT (OUTPATIENT)
Dept: FAMILY MEDICINE | Facility: OTHER | Age: 50
End: 2020-09-28

## 2020-09-28 DIAGNOSIS — Z20.822 SUSPECTED 2019 NOVEL CORONAVIRUS INFECTION: Primary | ICD-10-CM

## 2020-09-28 DIAGNOSIS — Z20.822 SUSPECTED 2019 NOVEL CORONAVIRUS INFECTION: ICD-10-CM

## 2020-09-28 PROCEDURE — U0003 INFECTIOUS AGENT DETECTION BY NUCLEIC ACID (DNA OR RNA); SEVERE ACUTE RESPIRATORY SYNDROME CORONAVIRUS 2 (SARS-COV-2) (CORONAVIRUS DISEASE [COVID-19]), AMPLIFIED PROBE TECHNIQUE, MAKING USE OF HIGH THROUGHPUT TECHNOLOGIES AS DESCRIBED BY CMS-2020-01-R: HCPCS | Performed by: FAMILY MEDICINE

## 2020-09-28 NOTE — PROGRESS NOTES
"Date: 2020 09:42:12  Clinician: Chiki Salvador  Clinician NPI: [id number]  Patient: Crista Ma  Patient : 1970  Patient Address: [facility] [place] [address]  Patient Phone: [phone]  Visit Protocol: URI  Patient Summary:  Crista is a 50 year old ( : 1970 ) female who initiated a [facility] Visit for COVID-19 (Coronavirus) evaluation and screening. When asked the question \"Please sign me up to receive news, health information and promotions. \", Crista responded \"Yes\".    Crista states her symptoms started today.   Her symptoms consist of a cough, nasal congestion, a headache, anosmia, rhinitis, nausea, wheezing, malaise, and diarrhea. She is experiencing mild difficulty breathing with activities but can speak normally in full sentences.   Symptom details     Nasal secretions: The color of her mucus is yellow.    Cough: Crista coughs almost every minute and her cough is more bothersome at night. Phlegm comes into her throat when she coughs. She does not believe her cough is caused by post-nasal drip. The color of the phlegm is clear, white, and yellow.     Wheezing: Crista has been diagnosed with asthma. Additional wheezing details as reported by the patient (free text): My wheezing started 2 days ago       Headache: She states the headache is mild (1-3 on a 10 point pain scale).      Crista denies having ear pain, vomiting, facial pain or pressure, fever, myalgias, chills, sore throat, teeth pain, and ageusia. She also denies having a sinus infection within the past year, taking antibiotic medication in the past month, and having recent facial or sinus surgery in the past 60 days.   Precipitating events  She has not recently been exposed to someone with influenza. Crista has been in close contact with the following high risk individuals: adults 65 or older and children under the age of 5.   Pertinent COVID-19 (Coronavirus) information  In the past 14 days, Crista has not worked in a " congregate living setting.   She does not work or volunteer as healthcare worker or a  and does not work or volunteer in a healthcare facility.   Crista also has not lived in a congregate living setting in the past 14 days. She does not live with a healthcare worker.   Crista has not had a close contact with a laboratory-confirmed COVID-19 patient within 14 days of symptom onset.   Since December 2019, Crista and has had upper respiratory infection (URI) or influenza-like illness. Has not been diagnosed with lab-confirmed COVID-19 test      Date(s) of previous URI or influenza-like illness (free-text): 9/23/2020 to cuurent     Symptoms Crista experienced during previous URI or influenza-like illness as reported by the patient (free-text): runny itchy nose, deep cough, flem, naseua        Pertinent medical history  Crista does not get yeast infections when she takes antibiotics.   Crista does not need a return to work/school note.   Weight: 214 lbs   Crista does not smoke or use smokeless tobacco.   She denies pregnancy and denies breastfeeding. She does not menstruate.   Weight: 214 lbs    MEDICATIONS: albuterol sulfate oral, montelukast oral, ALLERGIES: NKDA  Clinician Response:  Dear Crista,   Your symptoms show that you may have coronavirus (COVID-19). This illness can cause fever, cough and trouble breathing. Many people get a mild case and get better on their own. Some people can get very sick.  What should I do?  We would like to test you for this virus.   1. Please call [phone] to schedule your visit. Explain that you were referred by [facility] to have a COVID-19 test. Be ready to share your [facility] visit ID number.  The following will serve as your written order for this COVID Test, ordered by me, for the indication of suspected COVID [Z20.828]: The test will be ordered in [employer], our electronic health record, after you are scheduled. It will show as ordered and authorized by Bennie Thurman MD.  Order:  "COVID-19 (Coronavirus) PCR for SYMPTOMATIC testing from [facility].      2. When it's time for your COVID test:  Stay at least 6 feet away from others. (If someone will drive you to your test, stay in the backseat, as far away from the  as you can.)   Cover your mouth and nose with a mask, tissue or washcloth.  Go straight to the testing site. Don't make any stops on the way there or back.      3.Starting now: Stay home and away from others (self-isolate) until:   You've had no fever---and no medicine that reduces fever---for one full day (24 hours). And...   Your other symptoms have gotten better. For example, your cough or breathing has improved. And...   At least 10 days have passed since your symptoms started.       During this time, don't leave the house except for testing or medical care.   Stay in your own room, even for meals. Use your own bathroom if you can.   Stay away from others in your home. No hugging, kissing or shaking hands. No visitors.  Don't go to work, school or anywhere else.    Clean \"high touch\" surfaces often (doorknobs, counters, handles, etc.). Use a household cleaning spray or wipes. You'll find a full list of  on the EPA website: www.epa.gov/pesticide-registration/list-n-disinfectants-use-against-sars-cov-2.   Cover your mouth and nose with a mask, tissue or washcloth to avoid spreading germs.  Wash your hands and face often. Use soap and water.  Caregivers in these groups are at risk for severe illness due to COVID-19:  o People 65 years and older  o People who live in a nursing home or long-term care facility  o People with chronic disease (lung, heart, cancer, diabetes, kidney, liver, immunologic)  o People who have a weakened immune system, including those who:   Are in cancer treatment  Take medicine that weakens the immune system, such as corticosteroids  Had a bone marrow or organ transplant  Have an immune deficiency  Have poorly controlled HIV or AIDS  Are obese (body " mass index of 40 or higher)  Smoke regularly   o Caregivers should wear gloves while washing dishes, handling laundry and cleaning bedrooms and bathrooms.  o Use caution when washing and drying laundry: Don't shake dirty laundry, and use the warmest water setting that you can.  o For more tips, go to www.cdc.gov/coronavirus/2019-ncov/downloads/10Things.pdf.    How can I take care of myself?    Get lots of rest. Drink extra fluids (unless a doctor has told you not to).   Take Tylenol (acetaminophen) for fever or pain. If you have liver or kidney problems, ask your family doctor if it's okay to take Tylenol.   Adults can take either:    650 mg (two 325 mg pills) every 4 to 6 hours, or...   1,000 mg (two 500 mg pills) every 8 hours as needed.    Note: Don't take more than 3,000 mg in one day. Acetaminophen is found in many medicines (both prescribed and over-the-counter medicines). Read all labels to be sure you don't take too much.   For children, check the Tylenol bottle for the right dose. The dose is based on the child's age or weight.    If you have other health problems (like cancer, heart failure, an organ transplant or severe kidney disease): Call your specialty clinic if you don't feel better in the next 2 days.       Know when to call 911. Emergency warning signs include:    Trouble breathing or shortness of breath Pain or pressure in the chest that doesn't go away Feeling confused like you haven't felt before, or not being able to wake up Bluish-colored lips or face.  Where can I get more information?    [facility] [facility] -- About COVID-19: [email or web address]   CDC -- What to Do If You're Sick: www.cdc.gov/coronavirus/2019-ncov/about/steps-when-sick.html   CDC -- Ending Home Isolation: www.cdc.gov/coronavirus/2019-ncov/hcp/disposition-in-home-patients.html   CDC -- Caring for Someone: www.cdc.gov/coronavirus/2019-ncov/if-you-are-sick/care-for-someone.html   [facility] -- Interim Guidance for Hospital  Discharge to Home: www.health.[facility].[place]/diseases/coronavirus/hcp/hospdischarge.pdf   [facility] clinical trials (COVID-19 research studies): clinicalaffairs.[facility]/n-clinical-trials    Below are the COVID-19 hotlines at the [place] Department of Health ([facility]). Interpreters are available.    For health questions: Call 381-011-3493 or 1-354.796.1081 (7 a.m. to 7 p.m.) For questions about schools and childcare: Call 363-622-2253 or 1-986.444.5079 (7 a.m. to 7 p.m.)    Diagnosis: Acute upper respiratory infection, unspecified  Diagnosis ICD: J06.9

## 2020-09-29 ENCOUNTER — NURSE TRIAGE (OUTPATIENT)
Dept: NURSING | Facility: CLINIC | Age: 50
End: 2020-09-29

## 2020-09-29 LAB
SARS-COV-2 RNA SPEC QL NAA+PROBE: NOT DETECTED
SPECIMEN SOURCE: NORMAL

## 2020-09-29 NOTE — TELEPHONE ENCOUNTER
Patient calling, and states she has a deep deep cough, and she has had it for 5 days.   She reports  No fever.  She reports a history of Asthma  She is calling to report that she thinks she has Pneumonia, because she is having tightness in chest , and a deep cough.  She states she was covid tested yesterday at [facility], and she just  Found out her test was negative.    Patient was advised that she should report to the ER /  Patient agreed .    Shanice Benson RN on 9/29/2020 at 4:00 PM    Reason for Disposition    Chest pain  (Exception: MILD central chest pain, present only when coughing)    Patient sounds very sick or weak to the triager    Additional Information    Negative: Severe difficulty breathing (e.g., struggling for each breath, speaks in single words)    Negative: Bluish (or gray) lips or face now    Negative: [1] Difficulty breathing AND [2] exposure to flames, smoke, or fumes    Negative: [1] Stridor AND [2] difficulty breathing    Negative: Sounds like a life-threatening emergency to the triager    Negative: [1] Previous asthma attacks AND [2] this feels like asthma attack    Negative: Dry (non-productive) cough (i.e., no sputum or minimal clear sputum)    Negative: Difficulty breathing    Protocols used: COUGH - ACUTE [id number]

## 2020-09-30 ENCOUNTER — APPOINTMENT (OUTPATIENT)
Dept: GENERAL RADIOLOGY | Facility: CLINIC | Age: 50
End: 2020-09-30
Attending: EMERGENCY MEDICINE
Payer: COMMERCIAL

## 2020-09-30 ENCOUNTER — HOSPITAL ENCOUNTER (EMERGENCY)
Facility: CLINIC | Age: 50
Discharge: HOME | End: 2020-09-30
Attending: EMERGENCY MEDICINE | Admitting: EMERGENCY MEDICINE
Payer: COMMERCIAL

## 2020-09-30 VITALS
RESPIRATION RATE: 20 BRPM | BODY MASS INDEX: 32.51 KG/M2 | HEIGHT: 68 IN | TEMPERATURE: 97.8 F | HEART RATE: 105 BPM | DIASTOLIC BLOOD PRESSURE: 93 MMHG | SYSTOLIC BLOOD PRESSURE: 133 MMHG | OXYGEN SATURATION: 96 %

## 2020-09-30 DIAGNOSIS — J20.9 ACUTE BRONCHITIS, UNSPECIFIED ORGANISM: ICD-10-CM

## 2020-09-30 DIAGNOSIS — Z20.822 SUSPECTED COVID-19 VIRUS INFECTION: ICD-10-CM

## 2020-09-30 DIAGNOSIS — J45.41 MODERATE PERSISTENT ASTHMA WITH EXACERBATION: ICD-10-CM

## 2020-09-30 LAB
SARS-COV-2 RNA SPEC QL NAA+PROBE: NOT DETECTED
SPECIMEN SOURCE: NORMAL

## 2020-09-30 PROCEDURE — U0003 INFECTIOUS AGENT DETECTION BY NUCLEIC ACID (DNA OR RNA); SEVERE ACUTE RESPIRATORY SYNDROME CORONAVIRUS 2 (SARS-COV-2) (CORONAVIRUS DISEASE [COVID-19]), AMPLIFIED PROBE TECHNIQUE, MAKING USE OF HIGH THROUGHPUT TECHNOLOGIES AS DESCRIBED BY CMS-2020-01-R: HCPCS | Performed by: EMERGENCY MEDICINE

## 2020-09-30 PROCEDURE — 25000131 ZZH RX MED GY IP 250 OP 636 PS 637: Performed by: EMERGENCY MEDICINE

## 2020-09-30 PROCEDURE — 99284 EMERGENCY DEPT VISIT MOD MDM: CPT | Mod: 25

## 2020-09-30 PROCEDURE — C9803 HOPD COVID-19 SPEC COLLECT: HCPCS

## 2020-09-30 PROCEDURE — 71045 X-RAY EXAM CHEST 1 VIEW: CPT

## 2020-09-30 PROCEDURE — 25000132 ZZH RX MED GY IP 250 OP 250 PS 637: Performed by: EMERGENCY MEDICINE

## 2020-09-30 RX ORDER — ALBUTEROL SULFATE 90 UG/1
4 AEROSOL, METERED RESPIRATORY (INHALATION) ONCE
Status: COMPLETED | OUTPATIENT
Start: 2020-09-30 | End: 2020-09-30

## 2020-09-30 RX ORDER — PREDNISONE 20 MG/1
40 TABLET ORAL DAILY
Qty: 10 TABLET | Refills: 0 | Status: SHIPPED | OUTPATIENT
Start: 2020-09-30 | End: 2020-10-05

## 2020-09-30 RX ORDER — AZITHROMYCIN 250 MG/1
TABLET, FILM COATED ORAL
Qty: 6 TABLET | Refills: 0 | Status: SHIPPED | OUTPATIENT
Start: 2020-09-30 | End: 2023-04-17

## 2020-09-30 RX ORDER — IPRATROPIUM BROMIDE AND ALBUTEROL SULFATE 2.5; .5 MG/3ML; MG/3ML
1 SOLUTION RESPIRATORY (INHALATION) EVERY 4 HOURS PRN
Qty: 1 BOX | Refills: 1 | Status: SHIPPED | OUTPATIENT
Start: 2020-09-30 | End: 2023-04-17

## 2020-09-30 RX ORDER — PREDNISONE 20 MG/1
60 TABLET ORAL ONCE
Status: COMPLETED | OUTPATIENT
Start: 2020-09-30 | End: 2020-09-30

## 2020-09-30 RX ADMIN — PREDNISONE 60 MG: 20 TABLET ORAL at 10:11

## 2020-09-30 RX ADMIN — ALBUTEROL SULFATE 4 PUFF: 108 AEROSOL, METERED RESPIRATORY (INHALATION) at 10:14

## 2020-09-30 NOTE — ED PROVIDER NOTES
History     Chief Complaint:  Cough; Shortness of Breath; and Chest Pain      HPI  Crista Ma is a 50 year old female with a history of asthma who presents to the emergency departmen for evaluation of cough, shortness of breath, and chest pain. Patient had a virtual visit 2 days ago with complaints of cough, nasal congestion, a headache, anosmia, rhinitis, nausea, wheezing, malaise, and diarrhea. During that visit it was noted that she was coughing every minute. She stated that her cough worsens at night and produces a clear, white, and yellow phlegm. She was tested for COVID at that time which resulted negative.    Since then the patient has noted ongoing significant cough.  She has been using her albuterol metered-dose inhaler several times per day.  She is out of nebulization solution at this time.  She is also not taking her inhaled corticosteroid currently.  She has been on prednisone for her asthma in the past but did spend some time.  She is concerned about significant asthma exacerbation, bronchitis, or bacterial pneumonia.    Patient's headache is quite mild at this time.  She no longer has diarrhea or myalgias.  Her chief concern is really the significant cough.  The patient does not have anosmia or loss of taste at this time.    Allergies:  No Known Drug Allergies    Medications:    Albuterol  Symbicort  Singulair    Past Medical History:    Allergic rhinitis  Dysmenorrhea  Pneumothorax  Seasonal affective disorder  Asthma  VVF  Intrinsic sphincter deficiency    Past Surgical History:    Enlarge breast with implant  BETHANY, BSO  Implant sacral nerve stimulator  Bladder repair    Family History:    History reviewed. No pertinent family history.     Social History:  Smoking Status: Never Smoker  Alcohol Use: Yes  Drug Use: No  PCP: Aubrey Boyd   The patient presents to the emergency department by herself.  Marital Status:     Review of Systems  This is positive for mild headache, positive  "for runny nose positive for significant cough.  No chest pain.  No diarrhea.  No myalgias at this time.  All other systems are negative.    Physical Exam     Patient Vitals for the past 24 hrs:   BP Temp Temp src Pulse Resp SpO2 Height   09/30/20 0950 (!) 142/99 97.8  F (36.6  C) Oral 105 20 98 % 1.727 m (5' 8\")         Physical Exam  General: Resting comfortably on the gurney  Head:  The scalp, face, and head appear normal  Eyes:  The pupils are equal, round, and reactive to light    There is no nystagmus    Extraocular muscles are intact    Conjunctivae and sclerae are normal  ENT:    The nose is normal    Pinnae are normal    The oropharynx is normal    Uvula is in the midline  Neck:  Normal range of motion    There is no rigidity noted    There is no midline cervical spine pain/tenderness    Trachea is in the midline    No mass is detected  CV:  Regular rate and underlying rhythm     Normal S1/S2, no S3/S4    No pathological murmur detected  Resp:  Lungs reveal prolongation of expiratory phase.  There is bilateral wheezing in all pulmonary fields.  There is a reactive spastic cough noted.    GI:  Abdomen is soft, there is no rigidity    No distension    No tympani    No rebound tenderness     Non-surgical without peritoneal features  MS:  Normal muscular tone    Symmetric motor strength    No major joint effusions    No asymmetric leg swelling, no calf tenderness  Skin:  No rash or acute skin lesions noted  Neuro: Speech is normal and fluent  Psych:  Awake. Alert.      Normal affect.  Appropriate interactions.  Lymph: No anterior cervical lymphadenopathy noted    Emergency Department Course   Imaging:  Radiographic findings were communicated with the patient who voiced understanding of the findings.  XR Chest 1 view, portable:   IMPRESSION: Single portable AP view of the chest was obtained.   Cardiomediastinal silhouette is within normal limits. No suspicious   focal pulmonary opacities. Incidentally noted " azygos lobe. No   significant pleural effusion or pneumothorax.  as per radiology.    Laboratory:  Symptomatic COVID-19 PCR: Pending    Interventions:  1011 prednisone 60 mg Oral  1014 albuterol 4 puffs    Emergency Department Course:  Nursing notes and vitals reviewed. (486) I performed an exam of the patient as documented above.     Medicine administered as documented above. COVID swab obtained. This was sent to the lab for further testing, results above.     The patient was sent for a xray while in the emergency department, findings above.     1116 I rechecked the patient and discussed the results of her workup thus far.     Findings and plan explained to the Patient. Patient discharged home with instructions regarding supportive care, medications, and reasons to return. The importance of close follow-up was reviewed. The patient was prescribed azithromycin, Duoneb, and prednisone.      Impression & Plan    Medical Decision Making:  Patient presents with upper respiratory symptoms consistent with acute bronchitis with asthma exacerbation.  We did not provide nebulization treatments here given the heightened risk of aerosolization of potential COVID-19.  The patient was tested 2 days ago and was negative.  She was retested today.  There is no pulmonary infiltrates consistent with COVID-19 at this time.  This likely represents a viral or bacterial bronchitis with reactive airway disease.  Prednisone will be very helpful.  Nebulization solution will be prescribed for home.  We will let her know if her COVID test comes back positive.  She will be empirically placed on a azithromycin at this time to cover atypical pathogens as well.  She is not substantially hypoxic at this juncture and she is a candidate to go home.  We will provide her with a metered-dose inhaler to use as well.      Critical Care time:  none    Covid-19  Crista Ma was evaluated during a global COVID-19 pandemic, which necessitated  consideration that the patient might be at risk for infection with the SARS-CoV-2 virus that causes COVID-19.   Applicable protocols for evaluation were followed during the patient's care.   COVID-19 was considered as part of the patient's evaluation. The plan for testing is:  a test was obtained during this visit.    Diagnosis:    ICD-10-CM    1. Moderate persistent asthma with exacerbation  J45.41    2. Acute bronchitis, unspecified organism  J20.9    3. Suspected COVID-19 virus infection  Z20.828        Disposition:  Discharged to home    Discharge Medications:  New Prescriptions    AZITHROMYCIN (ZITHROMAX Z-LUCIANA) 250 MG TABLET    Take 2 tablets now, then 1 tablet daily for 4 days.    IPRATROPIUM - ALBUTEROL 0.5 MG/2.5 MG/3 ML (DUONEB) 0.5-2.5 (3) MG/3ML NEB SOLUTION    Take 1 vial (3 mLs) by nebulization every 4 hours as needed for shortness of breath / dyspnea or wheezing    PREDNISONE (DELTASONE) 20 MG TABLET    Take 2 tablets (40 mg) by mouth daily for 5 days Take two tablets (= 40mg) each day for 5 (five) days         Joss Armas  9/30/2020   EMERGENCY DEPARTMENT  Scribe Disclosure:  I, Joss Armas, am serving as a scribe at 9:49 AM on 9/30/2020 to document services personally performed by Miguel Gillespie MD based on my observations and the provider's statements to me.          Miguel Gillespie MD  09/30/20 [id number]

## 2020-09-30 NOTE — ED TRIAGE NOTES
"Pt c/o productive cough, CP, SOB and \"chest cold\" for 1 week. Had negative Covid test yesterday.  "

## 2020-09-30 NOTE — DISCHARGE INSTRUCTIONS
We will call you if your COVID test is positive  Take prednisone 40 mg daily for 5 days  Use your albuterol inhaler every 4 hours as needed  Use duo nebs with your nebulizer machine several times per day  We will place you on a azithromycin in the event that this represents a bacterial bronchitis, you do not have an obvious pneumonia.

## 2020-11-22 ENCOUNTER — HEALTH MAINTENANCE LETTER (OUTPATIENT)
Age: 50
End: 2020-11-22

## 2021-02-13 ENCOUNTER — HEALTH MAINTENANCE LETTER (OUTPATIENT)
Age: 51
End: 2021-02-13

## 2021-03-24 ENCOUNTER — IMMUNIZATION (OUTPATIENT)
Dept: PEDIATRICS | Facility: CLINIC | Age: 51
End: 2021-03-24
Payer: COMMERCIAL

## 2021-03-24 PROCEDURE — 0001A PR COVID VAC PFIZER DIL RECON 30 MCG/0.3 ML IM: CPT

## 2021-03-24 PROCEDURE — 91300 PR COVID VAC PFIZER DIL RECON 30 MCG/0.3 ML IM: CPT

## 2021-04-14 ENCOUNTER — IMMUNIZATION (OUTPATIENT)
Dept: PEDIATRICS | Facility: CLINIC | Age: 51
End: 2021-04-14
Attending: INTERNAL MEDICINE
Payer: COMMERCIAL

## 2021-04-14 PROCEDURE — 0002A PR COVID VAC PFIZER DIL RECON 30 MCG/0.3 ML IM: CPT

## 2021-04-14 PROCEDURE — 91300 PR COVID VAC PFIZER DIL RECON 30 MCG/0.3 ML IM: CPT

## 2021-06-02 ENCOUNTER — RECORDS - HEALTHEAST (OUTPATIENT)
Dept: ADMINISTRATIVE | Facility: CLINIC | Age: 51
End: 2021-06-02

## 2021-09-19 ENCOUNTER — HEALTH MAINTENANCE LETTER (OUTPATIENT)
Age: 51
End: 2021-09-19

## 2022-01-08 ENCOUNTER — HEALTH MAINTENANCE LETTER (OUTPATIENT)
Age: 52
End: 2022-01-08

## 2022-03-05 ENCOUNTER — HEALTH MAINTENANCE LETTER (OUTPATIENT)
Age: 52
End: 2022-03-05

## 2022-05-11 NOTE — ANESTHESIA CARE TRANSFER NOTE
Patient: Crista Ma    Procedure(s):  Davinci Assisted Vesicovaginal Fistula Repair, Cystoscopy, Right Retrograde pyelogram, and midurethral sling  - Wound Class: II-Clean Contaminated    Diagnosis: Vesicovaginal Fistula   Diagnosis Additional Information: No value filed.    Anesthesia Type:   General, ETT     Note:  Airway :Face Mask  Patient transferred to:PACU  Comments: Report to RNCHEL      Vitals: (Last set prior to Anesthesia Care Transfer)    CRNA VITALS  2/14/2017 1122 - 2/14/2017 1157      2/14/2017             Pulse: 87    SpO2: 99 %    Resp Rate (set): 10                Electronically Signed By: Charles Patrick Schlatter, APRN CRNA  February 14, 2017  11:57 AM  
decreased strength

## 2022-11-20 ENCOUNTER — HEALTH MAINTENANCE LETTER (OUTPATIENT)
Age: 52
End: 2022-11-20

## 2023-04-15 ENCOUNTER — HEALTH MAINTENANCE LETTER (OUTPATIENT)
Age: 53
End: 2023-04-15

## 2023-04-17 ENCOUNTER — OFFICE VISIT (OUTPATIENT)
Dept: OPHTHALMOLOGY | Facility: CLINIC | Age: 53
End: 2023-04-17
Payer: COMMERCIAL

## 2023-04-17 DIAGNOSIS — H52.4 PRESBYOPIA: ICD-10-CM

## 2023-04-17 DIAGNOSIS — L03.213 PRESEPTAL CELLULITIS OF RIGHT UPPER EYELID: Primary | ICD-10-CM

## 2023-04-17 PROCEDURE — 99203 OFFICE O/P NEW LOW 30 MIN: CPT | Performed by: OPHTHALMOLOGY

## 2023-04-17 ASSESSMENT — CONF VISUAL FIELD
METHOD: COUNTING FINGERS
OS_SUPERIOR_TEMPORAL_RESTRICTION: 0
OD_NORMAL: 1
OD_SUPERIOR_NASAL_RESTRICTION: 0
OD_SUPERIOR_TEMPORAL_RESTRICTION: 0
OS_INFERIOR_NASAL_RESTRICTION: 0
OS_SUPERIOR_NASAL_RESTRICTION: 0
OS_NORMAL: 1
OD_INFERIOR_NASAL_RESTRICTION: 0
OD_INFERIOR_TEMPORAL_RESTRICTION: 0
OS_INFERIOR_TEMPORAL_RESTRICTION: 0

## 2023-04-17 ASSESSMENT — SLIT LAMP EXAM - LIDS: COMMENTS: NORMAL

## 2023-04-17 ASSESSMENT — VISUAL ACUITY
OS_SC+: -1
OS_SC: 20/25
OD_SC+: -2
METHOD: SNELLEN - LINEAR
OD_SC: 20/25

## 2023-04-17 ASSESSMENT — CUP TO DISC RATIO
OD_RATIO: 0.25
OS_RATIO: 0.25

## 2023-04-17 ASSESSMENT — TONOMETRY
OD_IOP_MMHG: 19
OS_IOP_MMHG: 20
IOP_METHOD: ICARE

## 2023-04-17 NOTE — PROGRESS NOTES
HPI     Eye Lid Swelling    In right eye.           Comments    Patient states swelling of the right eyelid began on Saturday 4/8/23. Has been swelling off and on since. This morning eyelid was swollen shut. Feels a lot of pressure in her eye as well. No pain, just the pressure feeling. No change in vision. No drops. Has a daily allergy pill she typically takes, took this morning but previously has not taken for a couple of days.   She notes gravelly- foreign body sensation. This has never happened previously. Denies discharge. Denies diplopia. Denies fevers/chills. Venkat trauma          Last edited by Jacques Segovia MD on 4/17/2023 10:22 AM.         Review of systems for the eyes was negative other than the pertinent positives/negatives listed in the HPI.      Assessment & Plan    HPI:  Crista Ma is a 52 year old female with history of mild asthma, presents for right eye swelling x 1 week. Notes FBS. This has never happened previously. Denies discharge. Denies diplopia. Denies fevers/chills. Venkat trauma    She is on Ozempic for weight loss.     POHx: denies  PMHx: mild asthma  Current Medications: ALBUTEROL 108 (90 BASE) MCG/ACT inhaler, Inhale 1-2 puffs into the lungs every 4 hours as needed   budesonide-formoterol (SYMBICORT) 160-4.5 MCG/ACT Inhaler, Inhale 1-2 puffs into the lungs 2 times daily  Montelukast Sodium (SINGULAIR PO), Take 10 mg by mouth At Bedtime  Semaglutide, 1 MG/DOSE, (OZEMPIC) 4 MG/3ML pen, Inject 1 mg Subcutaneous every 7 days    No current facility-administered medications on file prior to visit.    FHx: mom-glasses  PSHx: denies history of ocular surgeries       Current Eye Medications:      Assessment & Plan:  (L03.213) Preseptal cellulitis of right upper eyelid  (primary encounter diagnosis)  Plan: amoxicillin-clavulanate (AUGMENTIN) 875-125 MG         tablet  Discussed return to clinic precautions including diplopia, pain, decreased vision, fevers, chills  Advised  probiotic in conjunction with abx  return to clinic 1 week for CEE      (H52.4) Presbyopia  Use OTC readers   Dilated exam       Return in about 1 week (around 4/24/2023) for Annual Visit.        Jacques Segovia MD     Attending Physician Attestation:  Complete documentation of historical and exam elements from today's encounter can be found in the full encounter summary report (not reduplicated in this progress note).  I personally obtained the chief complaint(s) and history of present illness.  I confirmed and edited as necessary the review of systems, past medical/surgical history, family history, social history, and examination findings as documented by others; and I examined the patient myself.  I personally reviewed the relevant tests, images, and reports as documented above.  I formulated and edited as necessary the assessment and plan and discussed the findings and management plan with the patient and family. - Jacques Segovia MD

## 2023-04-17 NOTE — NURSING NOTE
Chief Complaints and History of Present Illnesses   Patient presents with     Eye Lid Swelling       Chief Complaint(s) and History of Present Illness(es)     Eye Lid Swelling            Laterality: right eye          Comments    Patient states swelling of the right eyelid began on Saturday 4/8/23. Has been swelling off and on since. This morning eyelid was swollen shut. Feels a lot of pressure in her eye as well. No pain, just the pressure feeling. No change in vision. No drops. Has a daily allergy pill she typically takes, took this morning but previously has not taken for a couple of days.                  Cassia Harris, COT

## 2023-04-21 ENCOUNTER — TELEPHONE (OUTPATIENT)
Dept: OPHTHALMOLOGY | Facility: CLINIC | Age: 53
End: 2023-04-21
Payer: COMMERCIAL

## 2023-04-21 NOTE — TELEPHONE ENCOUNTER
Spoke with patient and scheduled her to come back to see Dr. Segovia on 4/28/23 at 9:45 (9:30 arrival)    SYD Peterson, 12:31 PM 04/21/2023

## 2023-04-21 NOTE — TELEPHONE ENCOUNTER
Health Call Center    Phone Message    May a detailed message be left on voicemail: yes     Reason for Call: Appointment Intake    Referring Provider Name: n/a  Diagnosis and/or Symptoms: rescheduling 1 week f/u w/ .    Pt has a 1 week f/u for 4/24 but states she is unavailable. Next available appt is for 6/2 please reach out to pt if sooner f/u appt is open for next week.     Thank You!    Action Taken: Message routed to:  [facility]    Travel Screening: Not Applicable

## 2023-04-28 ENCOUNTER — OFFICE VISIT (OUTPATIENT)
Dept: OPHTHALMOLOGY | Facility: CLINIC | Age: 53
End: 2023-04-28
Payer: COMMERCIAL

## 2023-04-28 DIAGNOSIS — H52.4 PRESBYOPIA: ICD-10-CM

## 2023-04-28 DIAGNOSIS — H52.03 HYPEROPIA OF BOTH EYES: ICD-10-CM

## 2023-04-28 DIAGNOSIS — L03.213 PRESEPTAL CELLULITIS OF RIGHT UPPER EYELID: Primary | ICD-10-CM

## 2023-04-28 PROCEDURE — 92014 COMPRE OPH EXAM EST PT 1/>: CPT | Performed by: OPHTHALMOLOGY

## 2023-04-28 PROCEDURE — 92015 DETERMINE REFRACTIVE STATE: CPT | Performed by: OPHTHALMOLOGY

## 2023-04-28 ASSESSMENT — CUP TO DISC RATIO
OS_RATIO: 0.15
OD_RATIO: 0.15

## 2023-04-28 ASSESSMENT — CONF VISUAL FIELD
OS_SUPERIOR_TEMPORAL_RESTRICTION: 0
OD_NORMAL: 1
OS_SUPERIOR_NASAL_RESTRICTION: 0
OD_SUPERIOR_TEMPORAL_RESTRICTION: 0
OS_INFERIOR_NASAL_RESTRICTION: 0
OS_NORMAL: 1
OD_SUPERIOR_NASAL_RESTRICTION: 0
OS_INFERIOR_TEMPORAL_RESTRICTION: 0
OD_INFERIOR_TEMPORAL_RESTRICTION: 0
METHOD: COUNTING FINGERS
OD_INFERIOR_NASAL_RESTRICTION: 0

## 2023-04-28 ASSESSMENT — REFRACTION_MANIFEST
OD_SPHERE: PLANO
OS_ADD: +2.25
OS_SPHERE: +0.50
OD_CYLINDER: +0.50
OD_AXIS: 165
OD_ADD: +2.25
OS_CYLINDER: SPHERE

## 2023-04-28 ASSESSMENT — TONOMETRY
IOP_METHOD: ICARE
OD_IOP_MMHG: 19
OS_IOP_MMHG: 19

## 2023-04-28 ASSESSMENT — SLIT LAMP EXAM - LIDS
COMMENTS: NORMAL
COMMENTS: NORMAL

## 2023-04-28 ASSESSMENT — VISUAL ACUITY
OD_SC: 20/25
OS_SC: 20/25
METHOD: SNELLEN - LINEAR

## 2023-04-28 NOTE — PROGRESS NOTES
HPI     Annual Eye Exam    In both eyes.           Comments    Patient here for follow up and yearly exam.   Swelling of the lids seems better.   Pt wants to be able to drive without having to take glasses on and off. Uses readers +1.75, nothing for distance.    Mom and dad wear glasses          Last edited by Jacques Segovia MD on 4/28/2023 10:13 AM.         Review of systems for the eyes was negative other than the pertinent positives/negatives listed in the HPI.      Assessment & Plan    HPI:  Crista Ma is a 52 year old female with history of mild asthma, presents for followup of  right eye swelling preseptal cellulitis and for annual exam.     She is on Ozempic for weight loss.     POHx: denies  PMHx: mild asthma  Current Medications: ALBUTEROL 108 (90 BASE) MCG/ACT inhaler, Inhale 1-2 puffs into the lungs every 4 hours as needed   budesonide-formoterol (SYMBICORT) 160-4.5 MCG/ACT Inhaler, Inhale 1-2 puffs into the lungs 2 times daily  Montelukast Sodium (SINGULAIR PO), Take 10 mg by mouth At Bedtime  Semaglutide, 1 MG/DOSE, (OZEMPIC) 4 MG/3ML pen, Inject 1 mg Subcutaneous every 7 days    No current facility-administered medications on file prior to visit.    FHx: mom and dad-glasses  PSHx: denies history of ocular surgeries       Current Eye Medications:      Assessment & Plan:  (L03.213) Preseptal cellulitis of right upper eyelid   Resolved with augmentin    (H52.4) Presbyopia  (H52.03) Hyperopia of both eyes  Patient has minimal hyperopia but a copy of today's glasses prescription was given.  The patient may wish to have MRx made, progressive OTC bifocals, or +2.25 OTC readers      Return in about 2 years (around 4/28/2025) for Annual Visit.        Jacques Segovia MD     Attending Physician Attestation:  Complete documentation of historical and exam elements from today's encounter can be found in the full encounter summary report (not reduplicated in this progress note).  I personally  obtained the chief complaint(s) and history of present illness.  I confirmed and edited as necessary the review of systems, past medical/surgical history, family history, social history, and examination findings as documented by others; and I examined the patient myself.  I personally reviewed the relevant tests, images, and reports as documented above.  I formulated and edited as necessary the assessment and plan and discussed the findings and management plan with the patient and family. - Jacques Segovia MD

## 2023-04-28 NOTE — NURSING NOTE
Chief Complaints and History of Present Illnesses   Patient presents with     Annual Eye Exam       Chief Complaint(s) and History of Present Illness(es)     Annual Eye Exam            Laterality: both eyes          Comments    Patient here for follow up and yearly exam.   Swelling of the lids seems better.   Pt wants to be able to drive without having to take glasses on and off. Uses readers +1.75, nothing for distance.                 Cassia Harris, COT

## 2024-06-22 ENCOUNTER — HEALTH MAINTENANCE LETTER (OUTPATIENT)
Age: 54
End: 2024-06-22

## 2025-02-24 ENCOUNTER — ANCILLARY PROCEDURE (OUTPATIENT)
Dept: GENERAL RADIOLOGY | Facility: CLINIC | Age: 55
End: 2025-02-24
Payer: COMMERCIAL

## 2025-02-24 ENCOUNTER — OFFICE VISIT (OUTPATIENT)
Dept: FAMILY MEDICINE | Facility: CLINIC | Age: 55
End: 2025-02-24
Payer: COMMERCIAL

## 2025-02-24 ENCOUNTER — TELEPHONE (OUTPATIENT)
Dept: FAMILY MEDICINE | Facility: CLINIC | Age: 55
End: 2025-02-24

## 2025-02-24 VITALS
HEART RATE: 100 BPM | BODY MASS INDEX: 36.46 KG/M2 | DIASTOLIC BLOOD PRESSURE: 88 MMHG | SYSTOLIC BLOOD PRESSURE: 132 MMHG | OXYGEN SATURATION: 96 % | RESPIRATION RATE: 12 BRPM | TEMPERATURE: 97.8 F | HEIGHT: 68 IN | WEIGHT: 240.6 LBS

## 2025-02-24 DIAGNOSIS — J45.31 MILD PERSISTENT ASTHMA WITH EXACERBATION: ICD-10-CM

## 2025-02-24 DIAGNOSIS — J45.30 MILD PERSISTENT ASTHMA WITHOUT COMPLICATION: ICD-10-CM

## 2025-02-24 DIAGNOSIS — J22 LOWER RESP. TRACT INFECTION: ICD-10-CM

## 2025-02-24 DIAGNOSIS — J01.90 ACUTE SINUSITIS WITH SYMPTOMS > 10 DAYS: ICD-10-CM

## 2025-02-24 DIAGNOSIS — J22 LOWER RESP. TRACT INFECTION: Primary | ICD-10-CM

## 2025-02-24 PROCEDURE — 99203 OFFICE O/P NEW LOW 30 MIN: CPT

## 2025-02-24 PROCEDURE — G2211 COMPLEX E/M VISIT ADD ON: HCPCS

## 2025-02-24 PROCEDURE — 71046 X-RAY EXAM CHEST 2 VIEWS: CPT | Mod: TC | Performed by: RADIOLOGY

## 2025-02-24 RX ORDER — ALBUTEROL SULFATE 90 UG/1
1-2 INHALANT RESPIRATORY (INHALATION) EVERY 4 HOURS PRN
Qty: 18 G | Refills: 3 | Status: SHIPPED | OUTPATIENT
Start: 2025-02-24

## 2025-02-24 RX ORDER — MONTELUKAST SODIUM 10 MG/1
10 TABLET ORAL AT BEDTIME
Qty: 90 TABLET | Refills: 0 | Status: SHIPPED | OUTPATIENT
Start: 2025-02-24

## 2025-02-24 RX ORDER — BUDESONIDE AND FORMOTEROL FUMARATE DIHYDRATE 160; 4.5 UG/1; UG/1
2 AEROSOL RESPIRATORY (INHALATION) 2 TIMES DAILY
Qty: 10.2 G | Refills: 0 | Status: SHIPPED | OUTPATIENT
Start: 2025-02-24

## 2025-02-24 RX ORDER — DOXYCYCLINE 100 MG/1
100 CAPSULE ORAL 2 TIMES DAILY
Qty: 14 CAPSULE | Refills: 0 | Status: SHIPPED | OUTPATIENT
Start: 2025-02-24 | End: 2025-03-03

## 2025-02-24 RX ORDER — PREDNISONE 20 MG/1
40 TABLET ORAL DAILY
Qty: 10 TABLET | Refills: 0 | Status: SHIPPED | OUTPATIENT
Start: 2025-02-24 | End: 2025-03-01

## 2025-02-24 ASSESSMENT — ASTHMA QUESTIONNAIRES
QUESTION_1 LAST FOUR WEEKS HOW MUCH OF THE TIME DID YOUR ASTHMA KEEP YOU FROM GETTING AS MUCH DONE AT WORK, SCHOOL OR AT HOME: A LITTLE OF THE TIME
QUESTION_4 LAST FOUR WEEKS HOW OFTEN HAVE YOU USED YOUR RESCUE INHALER OR NEBULIZER MEDICATION (SUCH AS ALBUTEROL): ONE OR TWO TIMES PER DAY
QUESTION_5 LAST FOUR WEEKS HOW WOULD YOU RATE YOUR ASTHMA CONTROL: POORLY CONTROLLED
ACT_TOTALSCORE: 10
ACT_TOTALSCORE: 10
QUESTION_3 LAST FOUR WEEKS HOW OFTEN DID YOUR ASTHMA SYMPTOMS (WHEEZING, COUGHING, SHORTNESS OF BREATH, CHEST TIGHTNESS OR PAIN) WAKE YOU UP AT NIGHT OR EARLIER THAN USUAL IN THE MORNING: FOUR OR MORE NIGHTS A WEEK
QUESTION_2 LAST FOUR WEEKS HOW OFTEN HAVE YOU HAD SHORTNESS OF BREATH: MORE THAN ONCE A DAY

## 2025-02-24 ASSESSMENT — PAIN SCALES - GENERAL: PAINLEVEL_OUTOF10: NO PAIN (0)

## 2025-02-24 ASSESSMENT — PATIENT HEALTH QUESTIONNAIRE - PHQ9
SUM OF ALL RESPONSES TO PHQ QUESTIONS 1-9: 6
SUM OF ALL RESPONSES TO PHQ QUESTIONS 1-9: 6
10. IF YOU CHECKED OFF ANY PROBLEMS, HOW DIFFICULT HAVE THESE PROBLEMS MADE IT FOR YOU TO DO YOUR WORK, TAKE CARE OF THINGS AT HOME, OR GET ALONG WITH OTHER PEOPLE: NOT DIFFICULT AT ALL

## 2025-02-24 NOTE — PATIENT INSTRUCTIONS
"Today we completed a chest x-ray to check for pneumonia. I will reach out via MyChart with results once images have been read by the radiologist. Given duration of symptoms, I have prescribed an antibiotic called doxycycline to cover both typical and atypical bacteria that cause upper and lower respiratory infection. Please take as prescribed with food to prevent upset stomach. Take separate from vitamin supplements. I would recommend eating yogurt/cottage cheese while taking to replenish good gut bacteria.     Please continue with supportive therapy including increasing fluids, encouraging rest, dosing with as needed acetaminophen (Tylenol) and/or ibuprofen (Advil) as needed for comfort and pain control.      For your cough you could try using as needed over the counter Robitussin (dextromethorphan).  This is a cough suppressants.  Other sorts of cough medications over-the-counter include medications with guaifenesin which is in expectorant which helps thin secretions to easily cough or spit up.    For the postnasal drip and runny nose I would recommend using a topical nasal steroid spray.  Common brands of this are Flonase.  I have included directions on use below.    - Nasal Corticosteroids: I recommend using a nasal corticosteroid spray, such as Flonase or Nasacort, to reduce inflammation in your nasal passages and improve breathing and drainage.  Use the nasal spray once daily (1-2 sprays into each nostril).   It is best to do a saline rinse just prior to using the nasal spray.  Shake and \"prime\" the bottle first making sure a nice spray comes out prior to use.  Aim back into the sinuses, not just straight up your nose. Also aim a little away from the center (septum) of your nose.   Breath in slightly (but not excessively) with each spray. When completed breath out through the mouth  Repeat on the other side.  Wipe of the nozzle with a clean tissue when complete and cover with the manufactures cap.    Store in " a room temperature area out of the light.  Don't share sprays with friends and family.    A side effect of most nasal sprays includes nose bleeds.  Be patient as it may take a few days to start working.

## 2025-02-24 NOTE — TELEPHONE ENCOUNTER
Patient Returning Call    Reason for call:  Patient is requesting order for colonoscopy and asthma specialist     Information relayed to patient:      Patient has additional questions:  Yes    What are your questions/concerns:  above    Who does the patient want to speak with:      Is an  needed?:  No      Could we send this information to you in [facility] or would you prefer to receive a phone call?:   Patient would prefer a phone call   Okay to leave a detailed message?: Yes at Cell number on file:    Telephone Information:   Mobile [phone]

## 2025-02-24 NOTE — PROGRESS NOTES
Assessment & Plan     Lower resp. tract infection  Patient is a 54 year-old female with mild persistent asthma presenting with concerns of persistent productive cough for the past 3 weeks. Given symptom duration and underlying lung disease, prescribed 7 day course of BID doxycyline for typical and atypical coverage. Formal CXR to evaluate for focal pneumonia, pending formal read by radiologist. Prescribed 5 day course of prednisone to further reduce airway inflammation. Discussed supportive management, recommended follow-up, and return precautions. Discussed proper use of medication(s) and potential side effects. Follow-up if symptoms fail to improve despite above interventions. Patient understands and is agreeable to plan as discussed in clinic.  - XR Chest 2 Views; Future  - doxycycline hyclate (VIBRAMYCIN) 100 MG capsule; Take 1 capsule (100 mg) by mouth 2 times daily for 7 days.  - predniSONE (DELTASONE) 20 MG tablet; Take 2 tablets (40 mg) by mouth daily for 5 days.    Acute sinusitis with symptoms > 10 days  Given symptom duration, concerns for bacterial process. Prescribed doxycycline for suspected lower respiratory tract infection, this will have sinus coverage as well. Discussed supportive management.   - doxycycline hyclate (VIBRAMYCIN) 100 MG capsule; Take 1 capsule (100 mg) by mouth 2 times daily for 7 days.    Mild persistent asthma without complication  No signs of exacerbation during encounter. Lungs sounds are clear without wheezes or crackles. Had been managed by [facility], but clinic recently closed. Needing to establish with PCP for further management and annual physicals. Short term refills provided to prevent going without management. Due to associated cough symptoms, prescribed 5 day course of prednisone to aid in reducing airway inflammation.   - predniSONE (DELTASONE) 20 MG tablet; Take 2 tablets (40 mg) by mouth daily for 5 days.  - Primary Care Referral; Future  - albuterol  (PROAIR HFA) 108 (90 Base) MCG/ACT inhaler; Inhale 1-2 puffs into the lungs every 4 hours as needed for shortness of breath, wheezing or cough.  - montelukast (SINGULAIR) 10 MG tablet; Take 1 tablet (10 mg) by mouth at bedtime.  - budesonide-formoterol (SYMBICORT/BREYNA) 160-4.5 MCG/ACT Inhaler; Inhale 2 puffs into the lungs 2 times daily.      Junito Tobin is a 54 year old, presenting for the following health issues:  Cough        2/24/2025    11:10 AM   Additional Questions   Roomed by Valencia CHENG   Accompanied by None         2/24/2025    11:10 AM   Patient Reported Additional Medications   Patient reports taking the following new medications NA     History of Present Illness       Reason for visit:  Exreme cough congestion  Symptom onset:  3-4 weeks ago  Symptoms include:  Cough sneeze wheeze congestion headache soreness in neck  Symptom intensity:  Severe  Symptom progression:  Worsening  Had these symptoms before:  Yes  Has tried/received treatment for these symptoms:  Yes  Previous treatment was successful:  Yes  Prior treatment description:  Antibiotics prednusone cough syrup  What makes it worse:  Not sure  What makes it better:  Not so far   She is taking medications regularly.     Acute Illness  Acute illness concerns: pneumonia   Onset/Duration: 3 weeks ago   Symptoms:  Fever: YES  Chills/Sweats: YES  Headache (location?): YES  Sinus Pressure: No  Conjunctivitis:  No  Ear Pain: no  Rhinorrhea: YES  Congestion: YES  Sore Throat: No  Cough: YES-productive of green sputum  Wheeze: YES  Decreased Appetite: No  Nausea: No  Vomiting: No  Diarrhea: No  Dysuria/Freq.: No  Dysuria or Hematuria: No  Fatigue/Achiness: YES  Sick/Strep Exposure: No  Therapies tried and outcome: cough meds    Presents with concerns of persistent cough for the past 3 weeks. Started off as URI and cough and sinus congestion has persisted. Has been taking OTC cough suppressants with some symptom relief. Has asthma, using daily  "Symbicort and PRN albuterol. Feels that asthma symptoms are worse with current illness.    Had been following with [facility] for asthma but they recently closed, looking to establish care within [place].     Denies fever, chills, wheezing, dyspnea, or orthopnea.         Objective    /88   Pulse 100   Temp 97.8  F (36.6  C) (Temporal)   Resp 12   Ht 1.727 m (5' 7.99\")   Wt 109.1 kg (240 lb 9.6 oz)   LMP 12/06/2016 (Exact Date)   SpO2 96%   BMI 36.59 kg/m    Body mass index is 36.59 kg/m .  Physical Exam  Vitals reviewed.   Constitutional:       General: She is not in acute distress.     Appearance: Normal appearance. She is not ill-appearing.   HENT:      Head: Normocephalic and atraumatic.      Right Ear: Tympanic membrane, ear canal and external ear normal. Tympanic membrane is not erythematous, retracted or bulging.      Left Ear: Ear canal and external ear normal. Tympanic membrane is erythematous. Tympanic membrane is not retracted or bulging.      Nose: Congestion and rhinorrhea present. Rhinorrhea is clear.      Right Sinus: No maxillary sinus tenderness or frontal sinus tenderness.      Left Sinus: No maxillary sinus tenderness or frontal sinus tenderness.      Mouth/Throat:      Mouth: Mucous membranes are moist. No oral lesions.      Pharynx: Oropharynx is clear. Posterior oropharyngeal erythema and postnasal drip present. No oropharyngeal exudate.      Tonsils: No tonsillar exudate.   Eyes:      Conjunctiva/sclera: Conjunctivae normal.      Right eye: Right conjunctiva is not injected. No exudate.     Left eye: Left conjunctiva is not injected. No exudate.     Pupils: Pupils are equal, round, and reactive to light.   Cardiovascular:      Rate and Rhythm: Normal rate and regular rhythm.      Heart sounds: Normal heart sounds, S1 normal and S2 normal. No murmur heard.  Pulmonary:      Effort: Pulmonary effort is normal. No tachypnea or respiratory distress.      Breath sounds: " Normal breath sounds. No wheezing, rhonchi or rales.   Lymphadenopathy:      Cervical: No cervical adenopathy.   Skin:     General: Skin is warm and dry.      Capillary Refill: Capillary refill takes less than 2 seconds.      Findings: No lesion or rash.   Neurological:      Mental Status: She is alert.   Psychiatric:         Attention and Perception: Attention and perception normal.         Mood and Affect: Mood and affect normal.        XR pending formal read by radiologist.         Signed Electronically by: TELMA Moses CNP

## 2025-02-25 NOTE — TELEPHONE ENCOUNTER
"Was seen by Sonali Abreu February 24, 2025 for \"low grade fever, cold, green phlegm with cough\"     Sonali- are you willing/able to place these referrals?   "

## 2025-04-01 ENCOUNTER — ANCILLARY ORDERS (OUTPATIENT)
Dept: FAMILY MEDICINE | Facility: CLINIC | Age: 55
End: 2025-04-01

## 2025-04-01 DIAGNOSIS — Z12.31 VISIT FOR SCREENING MAMMOGRAM: Primary | ICD-10-CM

## 2025-05-11 ASSESSMENT — ASTHMA QUESTIONNAIRES
ACT_TOTALSCORE: 23
QUESTION_3 LAST FOUR WEEKS HOW OFTEN DID YOUR ASTHMA SYMPTOMS (WHEEZING, COUGHING, SHORTNESS OF BREATH, CHEST TIGHTNESS OR PAIN) WAKE YOU UP AT NIGHT OR EARLIER THAN USUAL IN THE MORNING: NOT AT ALL
QUESTION_4 LAST FOUR WEEKS HOW OFTEN HAVE YOU USED YOUR RESCUE INHALER OR NEBULIZER MEDICATION (SUCH AS ALBUTEROL): ONCE A WEEK OR LESS
QUESTION_2 LAST FOUR WEEKS HOW OFTEN HAVE YOU HAD SHORTNESS OF BREATH: NOT AT ALL
QUESTION_5 LAST FOUR WEEKS HOW WOULD YOU RATE YOUR ASTHMA CONTROL: WELL CONTROLLED
QUESTION_1 LAST FOUR WEEKS HOW MUCH OF THE TIME DID YOUR ASTHMA KEEP YOU FROM GETTING AS MUCH DONE AT WORK, SCHOOL OR AT HOME: NONE OF THE TIME

## 2025-05-12 ENCOUNTER — TELEPHONE (OUTPATIENT)
Dept: FAMILY MEDICINE | Facility: CLINIC | Age: 55
End: 2025-05-12

## 2025-05-12 ENCOUNTER — OFFICE VISIT (OUTPATIENT)
Dept: FAMILY MEDICINE | Facility: CLINIC | Age: 55
End: 2025-05-12
Payer: COMMERCIAL

## 2025-05-12 VITALS
DIASTOLIC BLOOD PRESSURE: 94 MMHG | TEMPERATURE: 98 F | BODY MASS INDEX: 36.01 KG/M2 | SYSTOLIC BLOOD PRESSURE: 132 MMHG | HEIGHT: 68 IN | OXYGEN SATURATION: 96 % | HEART RATE: 87 BPM | WEIGHT: 237.6 LBS | RESPIRATION RATE: 18 BRPM

## 2025-05-12 DIAGNOSIS — E66.09 CLASS 2 OBESITY DUE TO EXCESS CALORIES WITH BODY MASS INDEX (BMI) OF 36.0 TO 36.9 IN ADULT, UNSPECIFIED WHETHER SERIOUS COMORBIDITY PRESENT: ICD-10-CM

## 2025-05-12 DIAGNOSIS — Z12.11 SCREEN FOR COLON CANCER: ICD-10-CM

## 2025-05-12 DIAGNOSIS — Z23 NEED FOR VACCINATION: ICD-10-CM

## 2025-05-12 DIAGNOSIS — E66.812 CLASS 2 OBESITY DUE TO EXCESS CALORIES WITH BODY MASS INDEX (BMI) OF 36.0 TO 36.9 IN ADULT, UNSPECIFIED WHETHER SERIOUS COMORBIDITY PRESENT: ICD-10-CM

## 2025-05-12 DIAGNOSIS — Z76.89 ENCOUNTER TO ESTABLISH CARE: Primary | ICD-10-CM

## 2025-05-12 DIAGNOSIS — Z13.6 SCREENING FOR CARDIOVASCULAR CONDITION: ICD-10-CM

## 2025-05-12 DIAGNOSIS — Z13.1 SCREENING FOR DIABETES MELLITUS: ICD-10-CM

## 2025-05-12 DIAGNOSIS — J45.30 MILD PERSISTENT ASTHMA WITHOUT COMPLICATION: ICD-10-CM

## 2025-05-12 LAB
ALBUMIN SERPL BCG-MCNC: 4.6 G/DL (ref 3.5–5.2)
ALP SERPL-CCNC: 76 U/L (ref 40–150)
ALT SERPL W P-5'-P-CCNC: 107 U/L (ref 0–50)
ANION GAP SERPL CALCULATED.3IONS-SCNC: 10 MMOL/L (ref 7–15)
AST SERPL W P-5'-P-CCNC: 48 U/L (ref 0–45)
BILIRUB SERPL-MCNC: 0.3 MG/DL
BUN SERPL-MCNC: 12.1 MG/DL (ref 6–20)
CALCIUM SERPL-MCNC: 10 MG/DL (ref 8.8–10.4)
CHLORIDE SERPL-SCNC: 103 MMOL/L (ref 98–107)
CHOLEST SERPL-MCNC: 301 MG/DL
CREAT SERPL-MCNC: 0.69 MG/DL (ref 0.51–0.95)
EGFRCR SERPLBLD CKD-EPI 2021: >90 ML/MIN/1.73M2
FASTING STATUS PATIENT QL REPORTED: YES
FASTING STATUS PATIENT QL REPORTED: YES
GLUCOSE SERPL-MCNC: 122 MG/DL (ref 70–99)
HCO3 SERPL-SCNC: 28 MMOL/L (ref 22–29)
HDLC SERPL-MCNC: 88 MG/DL
LDLC SERPL CALC-MCNC: 195 MG/DL
NONHDLC SERPL-MCNC: 213 MG/DL
POTASSIUM SERPL-SCNC: 4.3 MMOL/L (ref 3.4–5.3)
PROT SERPL-MCNC: 7.4 G/DL (ref 6.4–8.3)
SODIUM SERPL-SCNC: 141 MMOL/L (ref 135–145)
TRIGL SERPL-MCNC: 88 MG/DL
TSH SERPL DL<=0.005 MIU/L-ACNC: 2.39 UIU/ML (ref 0.3–4.2)

## 2025-05-12 PROCEDURE — 36415 COLL VENOUS BLD VENIPUNCTURE: CPT | Performed by: PHYSICIAN ASSISTANT

## 2025-05-12 PROCEDURE — 3080F DIAST BP >= 90 MM HG: CPT | Performed by: PHYSICIAN ASSISTANT

## 2025-05-12 PROCEDURE — 90677 PCV20 VACCINE IM: CPT | Performed by: PHYSICIAN ASSISTANT

## 2025-05-12 PROCEDURE — 84443 ASSAY THYROID STIM HORMONE: CPT | Performed by: PHYSICIAN ASSISTANT

## 2025-05-12 PROCEDURE — 80053 COMPREHEN METABOLIC PANEL: CPT | Performed by: PHYSICIAN ASSISTANT

## 2025-05-12 PROCEDURE — 3075F SYST BP GE 130 - 139MM HG: CPT | Performed by: PHYSICIAN ASSISTANT

## 2025-05-12 PROCEDURE — 90471 IMMUNIZATION ADMIN: CPT | Performed by: PHYSICIAN ASSISTANT

## 2025-05-12 PROCEDURE — 90715 TDAP VACCINE 7 YRS/> IM: CPT | Performed by: PHYSICIAN ASSISTANT

## 2025-05-12 PROCEDURE — 80061 LIPID PANEL: CPT | Performed by: PHYSICIAN ASSISTANT

## 2025-05-12 PROCEDURE — 99214 OFFICE O/P EST MOD 30 MIN: CPT | Mod: 25 | Performed by: PHYSICIAN ASSISTANT

## 2025-05-12 PROCEDURE — 90472 IMMUNIZATION ADMIN EACH ADD: CPT | Performed by: PHYSICIAN ASSISTANT

## 2025-05-12 PROCEDURE — 1126F AMNT PAIN NOTED NONE PRSNT: CPT | Performed by: PHYSICIAN ASSISTANT

## 2025-05-12 RX ORDER — MONTELUKAST SODIUM 10 MG/1
10 TABLET ORAL AT BEDTIME
Qty: 90 TABLET | Refills: 3 | Status: SHIPPED | OUTPATIENT
Start: 2025-05-12

## 2025-05-12 RX ORDER — BUDESONIDE AND FORMOTEROL FUMARATE DIHYDRATE 160; 4.5 UG/1; UG/1
2 AEROSOL RESPIRATORY (INHALATION) 2 TIMES DAILY
Qty: 10.2 G | Refills: 3 | Status: SHIPPED | OUTPATIENT
Start: 2025-05-12

## 2025-05-12 ASSESSMENT — PAIN SCALES - GENERAL: PAINLEVEL_OUTOF10: NO PAIN (0)

## 2025-05-12 NOTE — TELEPHONE ENCOUNTER
Central Prior Authorization Team - Phone: [phone]     PRIOR AUTHORIZATION DENIED    Medication: ZEPBOUND 2.5 MG/0.5ML SC SOAJ  Insurance Company: [employer] [place] - Phone [phone] Fax [phone]  Denial Date: 5/12/2025  Denial Reason(s):       Appeal Information:       Patient Notified: NO  Unfortunately, we cannot call the patient with denials because we do not know what next steps the MD will take nor can we give medical advice, please notify the patient of what they are to expect for the continuation of their therapy from the provider.

## 2025-05-12 NOTE — PROGRESS NOTES
"  Assessment & Plan   Encounter to establish care -blood pressure elevated no diagnosis of hypertension, she will come in in 2 to 3 weeks for blood pressure recheck with the nurse, she states she had a fair amount of alcohol yesterday and thinks that is why.    Class 2 obesity due to excess calories with body mass index (BMI) of 36.0 to 36.9 in adult, unspecified whether serious comorbidity present  She has no contraindications to GLP-1 agonist, we will restart her on the starting dose of tirzepatide knowing that she is unlikely to lose any weight with this dosage, she will message me after her third injection and let me know how she is tolerating the dosage if she is losing weight and whether or not she wants to increase the dosage.  If this is not covered or is not affordable, she will let me know and I can refer to weight management likely through [facility]  - TSH with free T4 reflex; Future  - tirzepatide-Weight Management (ZEPBOUND) 2.5 MG/0.5ML prefilled pen; Inject 0.5 mLs (2.5 mg) subcutaneously every 7 days.  Rechecking in 3 months face to face    Mild persistent asthma without complication  Stable, continue current meds recheck 6 to 12 months  - montelukast (SINGULAIR) 10 MG tablet; Take 1 tablet (10 mg) by mouth at bedtime.  - budesonide-formoterol (SYMBICORT/BREYNA) 160-4.5 MCG/ACT Inhaler; Inhale 2 puffs into the lungs 2 times daily.    Screening for cardiovascular condition    - Lipid panel reflex to direct LDL Fasting; Future    Screening for diabetes mellitus    - Comprehensive metabolic panel (BMP + Alb, Alk Phos, ALT, AST, Total. Bili, TP); Future    Screen for colon cancer    - Colonoscopy Screening  Referral; Future    Need for vaccination  Updating   - TDAP 10-64Y (ADACEL,BOOSTRIX)  - Pneumococcal 20 Valent Conjugate (PCV20)          BMI  Estimated body mass index is 36.14 kg/m  as calculated from the following:    Height as of this encounter: 1.727 m (5' 7.99\").    Weight as of this " encounter: 107.8 kg (237 lb 9.6 oz).   Weight management plan: Discussed healthy diet and exercise guidelines medication as above      This chart documentation was completed in part with Dragon voice recognition software.  Documentation is reviewed after completion, however, some words and grammatical errors may remain.  Citlalli Delgado PA-C      Junito Tobin is a 55 year old, presenting for the following health issues:  Establish Care and Weight Management        5/12/2025    10:22 AM   Additional Questions   Roomed by YK   Accompanied by self     History of Present Illness       Reason for visit:  Establish care   She is taking medications regularly.      Establish care. Would like to discuss weight. Patient states she keeps gaining weight, would like to discuss GLP-1. Would like Colonoscopy referral    She had previously been given tirzepatide through [employer].  Now that it is no longer in shortage she has been off the medication.  She would like to try this once again through her insurance.  She is working with a nutritional list   In regards to dietary recommendations, she recommended she do more strength training.  Patient states she does some Pilates already but can add some weights to this as well.  She has no family history of thyroid cancer or multiple endocrine neoplasia type II, no personal history of pancreatitis or gallstones.  She tolerated tirzepatide well without any side effects.  She does note that the first 2 steps did not result in any weight loss.    Her asthma has been well-controlled as below, she does not have any refills on her inhalers or Singulair.  She does use both meds daily.  She would like to update her colonoscopy order,    She has not had one recently but thinks she might of had 1 in the past.  She is not having any symptoms but would like to get this scheduled.    Asthma      5/11/2025    11:48 AM   ACT Total Scores   ACT TOTAL SCORE (Goal Greater than or Equal to 20) 23    In  "the past 12 months, how many times did you visit the emergency room for your asthma without being admitted to the hospital? 0   In the past 12 months, how many times were you hospitalized overnight because of your asthma? 0       Patient-reported     Do you have any of the following symptoms? None of these symptoms (cough/noisy breathing/trouble with breathing)  What makes your asthma/breathing worse?    Do you want more information about how to use your inhaler? No      Review of Systems  As documented above       Objective    BP (!) 132/94   Pulse 87   Temp 98  F (36.7  C) (Temporal)   Resp 18   Ht 1.727 m (5' 7.99\")   Wt 107.8 kg (237 lb 9.6 oz)   LMP 12/06/2016 (Exact Date)   SpO2 96%   BMI 36.14 kg/m    Body mass index is 36.14 kg/m .  Physical Exam   GENERAL: alert and no distress  NECK: no adenopathy, no asymmetry, masses, or scars  RESP: lungs clear to auscultation - no rales, rhonchi or wheezes  CV: regular rate and rhythm, normal S1 S2, no S3 or S4, no murmur, click or rub, no peripheral edema  ABDOMEN: soft, nontender, no hepatosplenomegaly, no masses and bowel sounds normal  MS: no gross musculoskeletal defects noted, no edema  PSYCH: mentation appears normal, affect normal/bright    No results found for any visits on 05/12/25.        Signed Electronically by: Citlalli Delgado PA-C    "

## 2025-05-14 ENCOUNTER — RESULTS FOLLOW-UP (OUTPATIENT)
Dept: FAMILY MEDICINE | Facility: CLINIC | Age: 55
End: 2025-05-14

## 2025-05-14 ENCOUNTER — TELEPHONE (OUTPATIENT)
Dept: GASTROENTEROLOGY | Facility: CLINIC | Age: 55
End: 2025-05-14

## 2025-05-14 ENCOUNTER — ANCILLARY PROCEDURE (OUTPATIENT)
Dept: MAMMOGRAPHY | Facility: CLINIC | Age: 55
End: 2025-05-14
Attending: FAMILY MEDICINE
Payer: COMMERCIAL

## 2025-05-14 ENCOUNTER — TELEPHONE (OUTPATIENT)
Dept: FAMILY MEDICINE | Facility: CLINIC | Age: 55
End: 2025-05-14

## 2025-05-14 DIAGNOSIS — Z12.31 VISIT FOR SCREENING MAMMOGRAM: ICD-10-CM

## 2025-05-14 PROCEDURE — 77063 BREAST TOMOSYNTHESIS BI: CPT

## 2025-05-14 PROCEDURE — 77067 SCR MAMMO BI INCL CAD: CPT

## 2025-05-14 NOTE — TELEPHONE ENCOUNTER
"Endoscopy Scheduling Screen    Caller: patient    Have you had any respiratory illness or flu-like symptoms in the last 10 days?  No    What is your communication preference for Instructions and/or Bowel Prep?   MyChart    What insurance is in the chart?  Other:  [employer]BS    Ordering/Referring Provider: Danny   (If ordering provider performs procedure, schedule with ordering provider unless otherwise instructed. )    BMI: Estimated body mass index is 36.14 kg/m  as calculated from the following:    Height as of 5/12/25: 1.727 m (5' 7.99\").    Weight as of 5/12/25: 107.8 kg (237 lb 9.6 oz).     Sedation Ordered  moderate sedation.   If patient BMI > 50 do not schedule in [facility].    If patient BMI > 45 do not schedule at [facility].    Are you taking methadone or Suboxone?  NO, No RN review required.    Have you been diagnosed and are being treated for severe PTSD or severe anxiety?  NO, No RN review required.    Are you taking any prescription medications for pain 3 or more times per week?   NO, No RN review required.    Do you have a history of malignant hyperthermia?  No    (Females) Are you currently pregnant?        Have you been diagnosed or told you have pulmonary hypertension?   No    Do you have an LVAD?  No    Have you been told you have moderate to severe sleep apnea?  No.    Have you been told you have COPD, asthma, or any other lung disease?  Yes     What breathing problems do you have?  Asthma     Do you use home oxygen?  No    Have your breathing problems required an ED visit or hospitalization in the last year?  No.    Has your doctor ordered any cardiac tests like echo, angiogram, stress test, ablation, or EKG, that you have not completed yet?  No    Do you  have a history of any heart conditions?  No     Have you ever had or are you waiting for an organ transplant?  No. Continue scheduling, no site restrictions.    Have you had a stroke or transient ischemic attack (TIA aka \"mini stroke\") in the last 2 " "years?   No.    Have you been diagnosed with or been told you have cirrhosis of the liver?   No.    Are you currently on dialysis?   No    Do you need assistance transferring?   No    BMI: Estimated body mass index is 36.14 kg/m  as calculated from the following:    Height as of 5/12/25: 1.727 m (5' 7.99\").    Weight as of 5/12/25: 107.8 kg (237 lb 9.6 oz).     Is patients BMI > 40 and scheduling location [facility]?  No    Do you take an injectable or oral medication for weight loss or diabetes (excluding insulin)?  No    Do you take the medication Naltrexone?  No    Do you take blood thinners?  No       Prep   Are you currently on dialysis or do you have chronic kidney disease?  No    Do you have a diagnosis of diabetes?  No    Do you have a diagnosis of cystic fibrosis (CF)?  No    On a regular basis do you go 3 -5 days between bowel movements?  No    BMI > 40?  No    Preferred Pharmacy:    [facility] [address] [place], [place] - [address] [facility]  [address] [facility] [address]  Phone: [phone] Fax: [phone]      Final Scheduling Details     Procedure scheduled  Colonoscopy    Surgeon:  6/4/25     Date of procedure:  Jose Ramon     Pre-OP / PAC:   No - Not required for this site.    Location  [facility] - [facility] - Patient preference.    Sedation   Moderate Sedation - Per order.      Patient Reminders:   You will receive a call from a Nurse to review instructions and health history.  This assessment must be completed prior to your procedure.  Failure to complete the Nurse assessment may result in the procedure being cancelled.      On the day of your procedure, please designate an adult(s) who can drive you home stay with you for the next 24 hours. The medicines used in the exam will make you sleepy. You will not be able to drive.      You cannot take public transportation, ride share services, or non-medical taxi service without a responsible caregiver.  Medical transport services are allowed with the requirement " that a responsible caregiver will receive you at your destination.  We require that drivers and caregivers are confirmed prior to your procedure.

## 2025-05-14 NOTE — TELEPHONE ENCOUNTER
Retail Pharmacy Prior Authorization Team  Phone: [phone]    PRIOR AUTHORIZATION DENIED    Medication: SYMBICORT 160-4.5 MCG/ACT IN AERO  Insurance Company: [employer] - Phone [phone] Fax [phone]  Denial Date: 5/14/2025  Denial Reason(s): waiting for denial  Appeal Information:   Patient Notified: NO- Unfortunately, we cannot call the patient with denials because we do not know what next steps the MD will take nor can we give medical advice, please notify the patient of what they are to expect for the continuation of their therapy from the provider.

## 2025-05-15 NOTE — TELEPHONE ENCOUNTER
Prior Authorization Not Needed per Insurance    Medication: SYMBICORT 160-4.5 MCG/ACT IN AERO  Insurance Company: [employer] - Phone [phone] Fax [phone]  Expected CoPay: $    Pharmacy Filling the Rx:    Pharmacy Notified: Yes-BRAND FILLED  Patient Notified: Yes

## 2025-05-16 ENCOUNTER — TELEPHONE (OUTPATIENT)
Dept: GASTROENTEROLOGY | Facility: CLINIC | Age: 55
End: 2025-05-16
Payer: COMMERCIAL

## 2025-05-16 DIAGNOSIS — Z12.11 SPECIAL SCREENING FOR MALIGNANT NEOPLASMS, COLON: Primary | ICD-10-CM

## 2025-05-16 RX ORDER — BISACODYL 5 MG/1
TABLET, DELAYED RELEASE ORAL
Qty: 4 TABLET | Refills: 0 | Status: SHIPPED | OUTPATIENT
Start: 2025-05-16 | End: 2025-05-20 | Stop reason: ALTCHOICE

## 2025-05-16 NOTE — TELEPHONE ENCOUNTER
Pre visit planning completed.      Procedure details:    Patient scheduled for Colonoscopy on 6/4/25.     Approximate arrival time: 0645. Procedure time 0730.   *Ensure patient is aware that endoscopy team will be calling about 2 days prior to procedure date to confirm arrival time as this may change.     Facility location: [facility]; [address], [place] [address]. Check in location: 2nd Floor at Surgery desk.  *Disclaimer: Drivers are to check in with patient and stay on campus during procedure.     Sedation type: Conscious sedation     Pre op exam needed? No.    Indication for procedure: screening      Chart review:     Electronic implanted devices? No    Recent diagnosis of diverticulitis within the last 6 weeks? No      Medication review:    Diabetic? No    Anticoagulants? No    Weight loss medication/injectable? Yes. Tirzepatide-Weight Management (Zepbound). Weekly dosing of medication.  HOLD 7 days before procedure.  Follow up with managing provider.     Other medication HOLDING recommendations:  N/A      Prep for procedure:     Bowel prep recommendation: Extended Golytely. Bowel prep sent to      [id number] - [place], [place] - [address]    Due to: GLP-1 agonist medication noted in chart    Procedure information and instructions sent via Tagboard         Corinne Kliber, RN  Endoscopy Procedure Pre Assessment   [phone] option 3

## 2025-05-16 NOTE — TELEPHONE ENCOUNTER
Attempted to contact patient in order to complete pre assessment questions.     No answer. Left message to return call to [phone] option 3.    Callback communication sent via BPT.    Carolina Bone LPN

## 2025-05-19 ENCOUNTER — VIRTUAL VISIT (OUTPATIENT)
Dept: FAMILY MEDICINE | Facility: CLINIC | Age: 55
End: 2025-05-19
Payer: COMMERCIAL

## 2025-05-19 DIAGNOSIS — R73.9 ELEVATED BLOOD SUGAR: ICD-10-CM

## 2025-05-19 DIAGNOSIS — E78.5 HYPERLIPIDEMIA LDL GOAL <100: ICD-10-CM

## 2025-05-19 DIAGNOSIS — R79.89 ELEVATED LFTS: Primary | ICD-10-CM

## 2025-05-19 DIAGNOSIS — E66.812 CLASS 2 OBESITY DUE TO EXCESS CALORIES WITH BODY MASS INDEX (BMI) OF 36.0 TO 36.9 IN ADULT, UNSPECIFIED WHETHER SERIOUS COMORBIDITY PRESENT: ICD-10-CM

## 2025-05-19 DIAGNOSIS — E66.09 CLASS 2 OBESITY DUE TO EXCESS CALORIES WITH BODY MASS INDEX (BMI) OF 36.0 TO 36.9 IN ADULT, UNSPECIFIED WHETHER SERIOUS COMORBIDITY PRESENT: ICD-10-CM

## 2025-05-19 PROCEDURE — 98006 SYNCH AUDIO-VIDEO EST MOD 30: CPT | Performed by: PHYSICIAN ASSISTANT

## 2025-05-19 ASSESSMENT — ANXIETY QUESTIONNAIRES
2. NOT BEING ABLE TO STOP OR CONTROL WORRYING: NOT AT ALL
GAD7 TOTAL SCORE: 0
5. BEING SO RESTLESS THAT IT IS HARD TO SIT STILL: NOT AT ALL
IF YOU CHECKED OFF ANY PROBLEMS ON THIS QUESTIONNAIRE, HOW DIFFICULT HAVE THESE PROBLEMS MADE IT FOR YOU TO DO YOUR WORK, TAKE CARE OF THINGS AT HOME, OR GET ALONG WITH OTHER PEOPLE: NOT DIFFICULT AT ALL
7. FEELING AFRAID AS IF SOMETHING AWFUL MIGHT HAPPEN: NOT AT ALL
GAD7 TOTAL SCORE: 0
6. BECOMING EASILY ANNOYED OR IRRITABLE: NOT AT ALL
3. WORRYING TOO MUCH ABOUT DIFFERENT THINGS: NOT AT ALL
7. FEELING AFRAID AS IF SOMETHING AWFUL MIGHT HAPPEN: NOT AT ALL
1. FEELING NERVOUS, ANXIOUS, OR ON EDGE: NOT AT ALL
GAD7 TOTAL SCORE: 0
8. IF YOU CHECKED OFF ANY PROBLEMS, HOW DIFFICULT HAVE THESE MADE IT FOR YOU TO DO YOUR WORK, TAKE CARE OF THINGS AT HOME, OR GET ALONG WITH OTHER PEOPLE?: NOT DIFFICULT AT ALL
4. TROUBLE RELAXING: NOT AT ALL

## 2025-05-19 ASSESSMENT — PATIENT HEALTH QUESTIONNAIRE - PHQ9
SUM OF ALL RESPONSES TO PHQ QUESTIONS 1-9: 1
SUM OF ALL RESPONSES TO PHQ QUESTIONS 1-9: 1
10. IF YOU CHECKED OFF ANY PROBLEMS, HOW DIFFICULT HAVE THESE PROBLEMS MADE IT FOR YOU TO DO YOUR WORK, TAKE CARE OF THINGS AT HOME, OR GET ALONG WITH OTHER PEOPLE: NOT DIFFICULT AT ALL

## 2025-05-19 NOTE — PROGRESS NOTES
Crista is a 55 year old who is being evaluated via a billable video visit.    How would you like to obtain your AVS? MyChart  If the video visit is dropped, the invitation should be resent by: Text to cell phone: [phone]  Will anyone else be joining your video visit? No      Assessment & Plan     Elevated LFTs  She will cut out alcohol and avoid Tylenol, we will recheck AST and ALT in 2 weeks.  We will do an abdominal ultrasound to evaluate her liver for hepatic steatosis.  Weight loss is advised in treatment of this.  - US Abdomen Limited; Future  - ALT; Future  - AST; Future  - Adult Nutrition  Referral; Future    Class 2 obesity due to excess calories with body mass index (BMI) of 36.0 to 36.9 in adult, unspecified whether serious comorbidity present  Prior authorization tirzepatide was declined it is not a covered benefit however I am going to write a letter of appeal as we have new information and her elevated LFTs elevated blood sugar and her hyperlipidemia.  Weight loss would help to improve all of these conditions.  Use of tirzepatide would be in combination with improvements to her diet and exercise.  I have referred her to nutrition for her hyperlipidemia  - US Abdomen Limited; Future  - Adult Nutrition  Referral; Future    Elevated blood sugar  Weight loss is advised, I have referred her to dietary  - Hemoglobin A1c; Future  - Adult Nutrition  Referral; Future    Hyperlipidemia LDL goal <100  LDL is over 190 I would recommend immediate use of rosuvastatin however I want to check her LFTs again in 2 weeks.  We would consider rosuvastatin 10 mg we discussed the body aches and pains and the elevated LFTs that may occur.  We will readdress this once I get her labs again in 2 weeks  - Adult Nutrition  Referral; Future    This chart documentation was completed in part with Dragon voice recognition software.  Documentation is reviewed after completion, however, some words  and grammatical errors may remain.  Citlalli Delgado PA-C      Junito Tobin is a 55 year old, presenting for the following health issues:  Results  - Follow up from 5/12 lab results. Cholesterol (LDL), Prediabetes, liver tests.      5/19/2025    12:54 PM   Additional Questions   Roomed by AD   Accompanied by Self     HPI      Her LFTs were noted to be elevated.  The weekend before she had her lab work she did have company and they did drink more alcohol and she routinely has but she is not aware of any history of hepatic steatosis.  She does not use Tylenol routinely.  She noted that her blood sugar was very elevated at 122.  Patient is working on a [place] and admits that she has been eating differently than usual as she has been practicing her recipes, she has been eating a lot more sugar than usual.  Also her LDL came back at 195 this is dramatically higher than usual.  She does not have any significant family history of heart attack or stroke however she is not aware of her father's history.  Her mother does have diabetes.  In the past she has been evaluated for sleep apnea though she did not have it at the time of the test.        Review of Systems  Constitutional, HEENT, cardiovascular, pulmonary, gi and gu systems are negative, except as otherwise noted.      Objective           Vitals:  No vitals were obtained today due to virtual visit.    Physical Exam   GENERAL: alert and no distress  EYES: Eyes grossly normal to inspection.  No discharge or erythema, or obvious scleral/conjunctival abnormalities.  RESP: No audible wheeze, cough, or visible cyanosis.    SKIN: Visible skin clear. No significant rash, abnormal pigmentation or lesions.  NEURO: Cranial nerves grossly intact.  Mentation and speech appropriate for age.  PSYCH: Appropriate affect, tone, and pace of words    Office Visit on 05/12/2025   Component Date Value Ref Range Status    Cholesterol 05/12/2025 301 (H)  <200 mg/dL Final     Triglycerides 05/12/2025 88  <150 mg/dL Final    Direct Measure HDL 05/12/2025 88  >=50 mg/dL Final    LDL Cholesterol Calculated 05/12/2025 195 (H)  <100 mg/dL Final    Non HDL Cholesterol 05/12/2025 213 (H)  <130 mg/dL Final    Patient Fasting > 8hrs? 05/12/2025 Yes   Final    Sodium 05/12/2025 141  135 - 145 mmol/L Final    Potassium 05/12/2025 4.3  3.4 - 5.3 mmol/L Final    Carbon Dioxide (CO2) 05/12/2025 28  22 - 29 mmol/L Final    Anion Gap 05/12/2025 10  7 - 15 mmol/L Final    Urea Nitrogen 05/12/2025 12.1  6.0 - 20.0 mg/dL Final    Creatinine 05/12/2025 0.69  0.51 - 0.95 mg/dL Final    GFR Estimate 05/12/2025 >90  >60 mL/min/1.73m2 Final    eGFR calculated using 2021 CKD-EPI equation.    Calcium 05/12/2025 10.0  8.8 - 10.4 mg/dL Final    Chloride 05/12/2025 103  98 - 107 mmol/L Final    Glucose 05/12/2025 122 (H)  70 - 99 mg/dL Final    Alkaline Phosphatase 05/12/2025 76  40 - 150 U/L Final    AST 05/12/2025 48 (H)  0 - 45 U/L Final    ALT 05/12/2025 107 (H)  0 - 50 U/L Final    Protein Total 05/12/2025 7.4  6.4 - 8.3 g/dL Final    Albumin 05/12/2025 4.6  3.5 - 5.2 g/dL Final    Bilirubin Total 05/12/2025 0.3  <=1.2 mg/dL Final    Patient Fasting > 8hrs? 05/12/2025 Yes   Final    TSH 05/12/2025 2.39  0.30 - 4.20 uIU/mL Final         Video-Visit Details    Type of service:  Video Visit   Originating Location (pt. Location): Home    Distant Location (provider location):  On-site  Platform used for Video Visit: Be  Signed Electronically by: Citlalli Delgado PA-C

## 2025-05-19 NOTE — LETTER
May 19, 2025      Crista Ma  [address]  [facility] [address]        To Whom It May Concern:    Crista Ma  was seen on the 12th, 2025 and May 19, 2025.   I am writing in response to the prior authorization denial we received for  Zepbound.  We do have new clinical information that may help to support the use of this medication.  Her LDL cholesterol has increased dramatically to 195.  She is now prediabetic and has elevated liver function tests.  I am completing the workup for hepatic steatosis at this time.  Weight loss would be a very good treatment option for her in regards to all of these new medical issues.  I have also referred her to dietary to address weight, hypercholesterolemia, and elevated blood sugar.  I have advised her to stop using alcohol which should help with the  presumed diagnosis of hepatic steatosis.  We have also discussed the importance of exercise along with dietary improvements as above.      Please reconsider your prior authorization denial.  In my medical opinion, all of her current health issues would be improved with the use of this medication.      Sincerely,        Citlalli Delgado PA-C    Electronically signed

## 2025-05-20 ENCOUNTER — PATIENT OUTREACH (OUTPATIENT)
Dept: CARE COORDINATION | Facility: CLINIC | Age: 55
End: 2025-05-20
Payer: COMMERCIAL

## 2025-05-20 RX ORDER — BISACODYL 5 MG/1
TABLET, DELAYED RELEASE ORAL
Qty: 4 TABLET | Refills: 0 | Status: SHIPPED | OUTPATIENT
Start: 2025-05-20

## 2025-05-20 NOTE — TELEPHONE ENCOUNTER
Pre assessment completed for upcoming procedure.   (Please see previous telephone encounter notes for complete details)      Procedure details:    Approximate time and facility location reviewed.   Patient is aware that endoscopy team will be calling about 2 days prior to confirm arrival time.    Designated  policy reviewed and that site requests drivers to check in and stay on campus. Instructed to have someone stay 6  hours post procedure.   *Disclaimer - please notify the [facility] RN GI staff with any  issues/concerns.    Medication review:    Medications reviewed. Please see supporting documentation below. Holding recommendations discussed (if applicable).     Pt is not taking Zepbound and has been off it for months for insurance reasons.   Prep for procedure:     Procedure prep instructions reviewed.      Prep changed to standard Golytely given medication review. Updated medication sent to previous pharmacy and instructions sent via email at pts request. [email or web address]    Any additional information needed:  N/A      Patient verbalized understanding and had no questions or concerns at this time.      Citlalli Sheridan RN  Endoscopy Procedure Pre Assessment   [phone] option 3

## 2025-05-22 ENCOUNTER — PATIENT OUTREACH (OUTPATIENT)
Dept: CARE COORDINATION | Facility: CLINIC | Age: 55
End: 2025-05-22
Payer: COMMERCIAL

## 2025-05-28 ENCOUNTER — RESULTS FOLLOW-UP (OUTPATIENT)
Dept: FAMILY MEDICINE | Facility: CLINIC | Age: 55
End: 2025-05-28

## 2025-05-28 ENCOUNTER — ANCILLARY PROCEDURE (OUTPATIENT)
Dept: ULTRASOUND IMAGING | Facility: OTHER | Age: 55
End: 2025-05-28
Attending: PHYSICIAN ASSISTANT
Payer: COMMERCIAL

## 2025-05-28 DIAGNOSIS — E66.09 CLASS 2 OBESITY DUE TO EXCESS CALORIES WITH BODY MASS INDEX (BMI) OF 36.0 TO 36.9 IN ADULT, UNSPECIFIED WHETHER SERIOUS COMORBIDITY PRESENT: ICD-10-CM

## 2025-05-28 DIAGNOSIS — E66.812 CLASS 2 OBESITY DUE TO EXCESS CALORIES WITH BODY MASS INDEX (BMI) OF 36.0 TO 36.9 IN ADULT, UNSPECIFIED WHETHER SERIOUS COMORBIDITY PRESENT: ICD-10-CM

## 2025-05-28 DIAGNOSIS — R79.89 ELEVATED LFTS: ICD-10-CM

## 2025-05-28 PROCEDURE — 76705 ECHO EXAM OF ABDOMEN: CPT | Mod: TC | Performed by: STUDENT IN AN ORGANIZED HEALTH CARE EDUCATION/TRAINING PROGRAM

## 2025-06-02 ENCOUNTER — TELEPHONE (OUTPATIENT)
Dept: GASTROENTEROLOGY | Facility: CLINIC | Age: 55
End: 2025-06-02
Payer: COMMERCIAL

## 2025-06-02 NOTE — TELEPHONE ENCOUNTER
Left voicemail of arrival time of 6:45 AM.     Fileboardt message sent with updated arrival time.

## 2025-06-04 ENCOUNTER — HOSPITAL ENCOUNTER (OUTPATIENT)
Facility: AMBULATORY SURGERY CENTER | Age: 55
End: 2025-06-04
Attending: STUDENT IN AN ORGANIZED HEALTH CARE EDUCATION/TRAINING PROGRAM
Payer: COMMERCIAL

## 2025-06-04 ENCOUNTER — TELEPHONE (OUTPATIENT)
Dept: GASTROENTEROLOGY | Facility: CLINIC | Age: 55
End: 2025-06-04
Payer: COMMERCIAL

## 2025-06-04 VITALS
TEMPERATURE: 97.4 F | RESPIRATION RATE: 18 BRPM | DIASTOLIC BLOOD PRESSURE: 112 MMHG | SYSTOLIC BLOOD PRESSURE: 141 MMHG | HEART RATE: 86 BPM | OXYGEN SATURATION: 96 %

## 2025-06-04 DIAGNOSIS — Z12.11 ENCOUNTER FOR SCREENING COLONOSCOPY: Primary | ICD-10-CM

## 2025-06-04 RX ORDER — ONDANSETRON 2 MG/ML
4 INJECTION INTRAMUSCULAR; INTRAVENOUS EVERY 6 HOURS PRN
Status: CANCELLED | OUTPATIENT
Start: 2025-06-04

## 2025-06-04 RX ORDER — NALOXONE HYDROCHLORIDE 0.4 MG/ML
0.4 INJECTION, SOLUTION INTRAMUSCULAR; INTRAVENOUS; SUBCUTANEOUS
Status: CANCELLED | OUTPATIENT
Start: 2025-06-04

## 2025-06-04 RX ORDER — LIDOCAINE 40 MG/G
CREAM TOPICAL
OUTPATIENT
Start: 2025-06-04

## 2025-06-04 RX ORDER — FLUMAZENIL 0.1 MG/ML
0.2 INJECTION, SOLUTION INTRAVENOUS
Status: CANCELLED | OUTPATIENT
Start: 2025-06-04 | End: 2025-06-04

## 2025-06-04 RX ORDER — ONDANSETRON 2 MG/ML
4 INJECTION INTRAMUSCULAR; INTRAVENOUS
Status: ACTIVE | OUTPATIENT
Start: 2025-06-04

## 2025-06-04 RX ORDER — NALOXONE HYDROCHLORIDE 0.4 MG/ML
0.2 INJECTION, SOLUTION INTRAMUSCULAR; INTRAVENOUS; SUBCUTANEOUS
Status: CANCELLED | OUTPATIENT
Start: 2025-06-04

## 2025-06-04 RX ORDER — PROCHLORPERAZINE MALEATE 10 MG
10 TABLET ORAL EVERY 6 HOURS PRN
Status: CANCELLED | OUTPATIENT
Start: 2025-06-04

## 2025-06-04 RX ORDER — ONDANSETRON 4 MG/1
4 TABLET, ORALLY DISINTEGRATING ORAL EVERY 6 HOURS PRN
Status: CANCELLED | OUTPATIENT
Start: 2025-06-04

## 2025-06-04 NOTE — TELEPHONE ENCOUNTER
Caller: [facility] Darrell charge RN    Reason for Reschedule/Cancellation (please be detailed, any staff messages or encounters to note?):   Prep was insufficient, moving case to next day with [facility]     Did you cancel or rescheduled an EUS procedure? No.    Is screening questionnaire older than 3 months from the reschedule date.   If Yes, please complete screening questionnaire. No    Prior to reschedule please review:  Ordering Provider: JOSSELYN KOLB  Sedation Determined: CS  Does patient have any ASC Exclusions, please identify?: No    Notes on Cancelled Procedure:  Procedure: Lower Endoscopy [Colonoscopy]   Date: 6/4/2025  Location: [facility]; [address], [place] [address]   Surgeon: Jose Ramon    Rescheduled: Yes,   Procedure: Lower Endoscopy [Colonoscopy]    Date: 6/5/2025   Location: [facility]; [address], [place] [address]    Surgeon: Elodia   Sedation Level Scheduled  CS ,  Reason for Sedation Level Order   Instructions updated and sent: Valerio     Does patient need PAC or Pre -Op Rescheduled? : No

## 2025-06-09 ENCOUNTER — RESULTS FOLLOW-UP (OUTPATIENT)
Dept: GASTROENTEROLOGY | Facility: CLINIC | Age: 55
End: 2025-06-09

## 2025-06-09 ENCOUNTER — LAB (OUTPATIENT)
Dept: LAB | Facility: CLINIC | Age: 55
End: 2025-06-09
Payer: COMMERCIAL

## 2025-06-09 DIAGNOSIS — R73.9 ELEVATED BLOOD SUGAR: ICD-10-CM

## 2025-06-09 DIAGNOSIS — R79.89 ELEVATED LFTS: ICD-10-CM

## 2025-06-09 LAB
ALT SERPL W P-5'-P-CCNC: 116 U/L (ref 0–50)
AST SERPL W P-5'-P-CCNC: 47 U/L (ref 0–45)
EST. AVERAGE GLUCOSE BLD GHB EST-MCNC: 126 MG/DL
HBA1C MFR BLD: 6 % (ref 0–5.6)

## 2025-06-09 PROCEDURE — 36415 COLL VENOUS BLD VENIPUNCTURE: CPT

## 2025-06-09 PROCEDURE — 83036 HEMOGLOBIN GLYCOSYLATED A1C: CPT

## 2025-06-09 PROCEDURE — 84450 TRANSFERASE (AST) (SGOT): CPT

## 2025-06-09 PROCEDURE — 84460 ALANINE AMINO (ALT) (SGPT): CPT

## 2025-06-19 PROBLEM — D12.6 ADENOMATOUS POLYP OF COLON: Status: ACTIVE | Noted: 2025-06-19

## 2025-07-12 ENCOUNTER — HEALTH MAINTENANCE LETTER (OUTPATIENT)
Age: 55
End: 2025-07-12

## 2025-07-21 ENCOUNTER — MYC MEDICAL ADVICE (OUTPATIENT)
Dept: FAMILY MEDICINE | Facility: CLINIC | Age: 55
End: 2025-07-21
Payer: COMMERCIAL

## 2025-07-21 DIAGNOSIS — G47.9 RESTLESS SLEEPER: ICD-10-CM

## 2025-07-21 DIAGNOSIS — R06.83 SNORING: Primary | ICD-10-CM

## 2025-07-21 NOTE — TELEPHONE ENCOUNTER
Last seen 5/19/2025    Requesting sleep study referral.    Lazarus Adams RN  [facility]  July 21, 2025

## 2025-07-22 ENCOUNTER — PATIENT OUTREACH (OUTPATIENT)
Dept: CARE COORDINATION | Facility: CLINIC | Age: 55
End: 2025-07-22
Payer: COMMERCIAL

## (undated) DEVICE — SYSTEM CLEARIFY VISUALIZATION 21-345

## (undated) DEVICE — GLOVE PROTEXIS BLUE W/NEU-THERA 7.5  2D73EB75

## (undated) DEVICE — NDL 16GA 1.5" 305198

## (undated) DEVICE — SU WND CLOSURE VLOC 180 ABS 3-0 6" V-20 VLOCL0604

## (undated) DEVICE — Device

## (undated) DEVICE — LINEN TOWEL PACK X5 5464

## (undated) DEVICE — CATH FOLEY 8FR 3ML LATEX

## (undated) DEVICE — SYR 30ML SLIP TIP W/O NDL 302833

## (undated) DEVICE — PREP POVIDONE IODINE SCRUB 7.5% 120ML

## (undated) DEVICE — DRAPE IOBAN INCISE 23X17" 6650EZ

## (undated) DEVICE — SU VICRYL 2-0 SH 27" UND J417H

## (undated) DEVICE — DRAPE LEGGINGS CLEAR 8430

## (undated) DEVICE — PREP CHLORAPREP W/ORANGE TINT 10.5ML 260715

## (undated) DEVICE — PAD CHUX UNDERPAD 23X24" 7136

## (undated) DEVICE — NDL 27GA 1.25" 305136

## (undated) DEVICE — DAVINCI HOT SHEARS TIP COVER  400180

## (undated) DEVICE — SOL WATER IRRIG 1000ML BOTTLE 07139-09

## (undated) DEVICE — SU VICRYL 0 TIE 54" J608H

## (undated) DEVICE — DRSG GAUZE 4X4" TRAY 6939

## (undated) DEVICE — LINEN TOWEL PACK X6 WHITE 5487

## (undated) DEVICE — PREP CHLORAPREP 26ML TINTED ORANGE  260815

## (undated) DEVICE — DAVINCI XI FCP BIPOLAR FENESTRATED 470205

## (undated) DEVICE — SU VICRYL 3-0 SH 27" UND J416H

## (undated) DEVICE — DAVINCI XI MONOPOLAR SCISSORS HOT SHEARS 8MM 470179

## (undated) DEVICE — SUCTION CANISTER MEDIVAC LINER 1500ML W/LID 65651-515

## (undated) DEVICE — PANTIES MESH LG/XLG 2PK 706M2

## (undated) DEVICE — SYR 30ML LL W/O NDL

## (undated) DEVICE — SOL WATER INJ 1000ML BAG 07990-09

## (undated) DEVICE — GLOVE PROTEXIS POWDER FREE 7.0 ORTHOPEDIC 2D73ET70

## (undated) DEVICE — GLOVE PROTEXIS W/NEU-THERA 7.0  2D73TE70

## (undated) DEVICE — DAVINCI XI DRAPE ARM 470015

## (undated) DEVICE — DEVICE SUTURE GRASPER TROCAR CLOSURE 14GA PMITCSG

## (undated) DEVICE — SU SILK 2-0 TIE 12X30" A305H

## (undated) DEVICE — ADHESIVE SWIFTSET 0.8ML OCTYL SS6

## (undated) DEVICE — DRAPE STERI TOWEL LG 1010

## (undated) DEVICE — BLADE SWITCH SCISSORS TIP 5MM 89-5100B

## (undated) DEVICE — SOL NACL 0.9% IRRIG 1000ML BOTTLE 2F7124

## (undated) DEVICE — DAVINCI TROCAR 8MM BLADELESS 470357

## (undated) DEVICE — TUBING SUCTION 10'X3/16" N510

## (undated) DEVICE — CATH FOLEY 10FR 3ML LATEX 0165SI10

## (undated) DEVICE — DRAPE C-ARM W/STRAPS 42X72" 07-CA104

## (undated) DEVICE — GUIDEWIRE SENSOR DUAL FLEX STR 0.035"X150CM M0066703080

## (undated) DEVICE — DRAPE SHEET MED 44X70" 9355

## (undated) DEVICE — DRSG STERI STRIP 1/2X4" R1547

## (undated) DEVICE — LINEN GOWN XLG 5407

## (undated) DEVICE — NDL COUNTER 20CT 31142493

## (undated) DEVICE — SYR 10ML FINGER CONTROL W/O NDL 309695

## (undated) DEVICE — RETR ELASTIC STAYS LONE STAR SHARP 5MM 8/PACK 3311-8G

## (undated) DEVICE — SUCTION MANIFOLD DORNOCH ULTRA CART UL-CL500

## (undated) DEVICE — GOWN XLG DISP 9545

## (undated) DEVICE — SU VICRYL 3-0 SH 27" J316H

## (undated) DEVICE — LIFTER SURGICAL ASCENDO SUBMUCOSAL LIFT AGENT BX00712934

## (undated) DEVICE — PROTECTOR ARM ONE-STEP TRENDELENBURG 40418

## (undated) DEVICE — NDL DEFLUX 3.7FRX350MM 23GA 241328

## (undated) DEVICE — CATH URETERAL OPEN END 05FR 70CM 020015

## (undated) DEVICE — STENT URETERAL FIRM 6FRX26CM W/O GW G23406: Type: IMPLANTABLE DEVICE | Site: URETER | Status: NON-FUNCTIONAL

## (undated) DEVICE — SOL NACL 0.9% IRRIG 3000ML BAG 2B7477

## (undated) DEVICE — GOWN LG DISP 9515

## (undated) DEVICE — PAD PERI INDIV WRAP 11" 2022A

## (undated) DEVICE — PACK DAVINCI UROL

## (undated) DEVICE — SOL ADH LIQUID BENZOIN SWAB 0.6ML C1544

## (undated) DEVICE — TUBING CONMED AIRSEAL SMOKE EVAC INSUFFLATION ASM-EVAC

## (undated) DEVICE — NDL 19GA 1.5"

## (undated) DEVICE — KIT ENDO FIRST STEP DISINFECTANT 200ML W/POUCH EP-4

## (undated) DEVICE — CATH FOLEY 16FR 5ML SILICONE LUBRI-SIL 175816

## (undated) DEVICE — DAVINCI XI NDL DRIVER LARGE 470006

## (undated) DEVICE — JELLY LUBRICATING SURGILUBE 2OZ TUBE

## (undated) DEVICE — DAVINCI XI GRASPER ENDOWRIST PROGRASP 470093

## (undated) DEVICE — ESU GROUND PAD ADULT REM W/15' CORD E7507DB

## (undated) DEVICE — PEN MARKING SKIN W/LABELS 31145884

## (undated) DEVICE — PREP POVIDONE IODINE SOLUTION 10% 120ML

## (undated) DEVICE — ENDO TROCAR CONMED AIRSEAL BLADELESS 12X120MM IAS12-120LP

## (undated) DEVICE — SOL BENZOIN 0.5OZ

## (undated) DEVICE — NDL ANGIOCATH 14GA 2" 4088

## (undated) DEVICE — NDL ENDOSCOPIC RIGID UROPLASTY 3.8FRX14.5" MRN-420

## (undated) DEVICE — SOL WATER IRRIG 1000ML BOTTLE 2F7114

## (undated) DEVICE — DRAPE BACK TABLE  44X90" 8377

## (undated) DEVICE — DAVINCI XI SEAL UNIVERSAL 5-8MM 470361

## (undated) DEVICE — DAVINCI XI DRAPE COLUMN 470341

## (undated) DEVICE — BAG URINARY DRAIN LUBRISIL IC 4000ML LF 253509A

## (undated) DEVICE — DRSG TEGADERM 2 3/8X2 3/4" 1624W

## (undated) DEVICE — DRAPE MAYO STAND 23X54 8337

## (undated) DEVICE — DAVINCI XI NDL DRIVER MEGA SUTURE CUT 8MM 470309

## (undated) DEVICE — WIPES FOLEY CARE SURESTEP PROVON DFC100

## (undated) DEVICE — RETR RING LONE STAR 28.3X18.3CM W/CATH CLIPSX2 3308G

## (undated) DEVICE — SU MONOCRYL 4-0 PS-2 27" UND Y426H

## (undated) DEVICE — GLOVE RADIATION RESISTANT SZ 7.5  95-395

## (undated) DEVICE — KIT PATIENT POSITIONING PIGAZZI LATEX FREE 40580

## (undated) DEVICE — TUBING IRRIG CYSTO/BLADDER SET 81" LF 2C4040

## (undated) RX ORDER — LIDOCAINE HYDROCHLORIDE 20 MG/ML
INJECTION, SOLUTION EPIDURAL; INFILTRATION; INTRACAUDAL; PERINEURAL
Status: DISPENSED
Start: 2018-02-26

## (undated) RX ORDER — GABAPENTIN 300 MG/1
CAPSULE ORAL
Status: DISPENSED
Start: 2018-02-26

## (undated) RX ORDER — LIDOCAINE HYDROCHLORIDE 20 MG/ML
INJECTION, SOLUTION EPIDURAL; INFILTRATION; INTRACAUDAL; PERINEURAL
Status: DISPENSED
Start: 2019-11-05

## (undated) RX ORDER — CEFAZOLIN SODIUM 2 G/100ML
INJECTION, SOLUTION INTRAVENOUS
Status: DISPENSED
Start: 2018-02-26

## (undated) RX ORDER — FENTANYL CITRATE 50 UG/ML
INJECTION, SOLUTION INTRAMUSCULAR; INTRAVENOUS
Status: DISPENSED
Start: 2017-02-14

## (undated) RX ORDER — ONDANSETRON 2 MG/ML
INJECTION INTRAMUSCULAR; INTRAVENOUS
Status: DISPENSED
Start: 2018-02-26

## (undated) RX ORDER — GABAPENTIN 300 MG/1
CAPSULE ORAL
Status: DISPENSED
Start: 2019-11-05

## (undated) RX ORDER — PROPOFOL 10 MG/ML
INJECTION, EMULSION INTRAVENOUS
Status: DISPENSED
Start: 2019-11-05

## (undated) RX ORDER — DEXAMETHASONE SODIUM PHOSPHATE 4 MG/ML
INJECTION, SOLUTION INTRA-ARTICULAR; INTRALESIONAL; INTRAMUSCULAR; INTRAVENOUS; SOFT TISSUE
Status: DISPENSED
Start: 2019-12-16

## (undated) RX ORDER — FENTANYL CITRATE 50 UG/ML
INJECTION, SOLUTION INTRAMUSCULAR; INTRAVENOUS
Status: DISPENSED
Start: 2018-02-26

## (undated) RX ORDER — HYDROMORPHONE HYDROCHLORIDE 1 MG/ML
INJECTION, SOLUTION INTRAMUSCULAR; INTRAVENOUS; SUBCUTANEOUS
Status: DISPENSED
Start: 2017-02-14

## (undated) RX ORDER — ONDANSETRON 2 MG/ML
INJECTION INTRAMUSCULAR; INTRAVENOUS
Status: DISPENSED
Start: 2019-11-05

## (undated) RX ORDER — FENTANYL CITRATE 50 UG/ML
INJECTION, SOLUTION INTRAMUSCULAR; INTRAVENOUS
Status: DISPENSED
Start: 2019-12-16

## (undated) RX ORDER — SIMETHICONE 40MG/0.6ML
SUSPENSION, DROPS(FINAL DOSAGE FORM)(ML) ORAL
Status: DISPENSED
Start: 2025-06-04

## (undated) RX ORDER — ACETAMINOPHEN 325 MG/1
TABLET ORAL
Status: DISPENSED
Start: 2019-12-16

## (undated) RX ORDER — CIPROFLOXACIN 500 MG/1
TABLET, FILM COATED ORAL
Status: DISPENSED
Start: 2017-08-30

## (undated) RX ORDER — CEFAZOLIN SODIUM 2 G/100ML
INJECTION, SOLUTION INTRAVENOUS
Status: DISPENSED
Start: 2017-02-14

## (undated) RX ORDER — PROPOFOL 10 MG/ML
INJECTION, EMULSION INTRAVENOUS
Status: DISPENSED
Start: 2018-02-26

## (undated) RX ORDER — ACETAMINOPHEN 325 MG/1
TABLET ORAL
Status: DISPENSED
Start: 2018-02-26

## (undated) RX ORDER — ACETAMINOPHEN 325 MG/1
TABLET ORAL
Status: DISPENSED
Start: 2019-11-05

## (undated) RX ORDER — CEFAZOLIN SODIUM 2 G/100ML
INJECTION, SOLUTION INTRAVENOUS
Status: DISPENSED
Start: 2019-12-16

## (undated) RX ORDER — ONDANSETRON 2 MG/ML
INJECTION INTRAMUSCULAR; INTRAVENOUS
Status: DISPENSED
Start: 2019-12-16

## (undated) RX ORDER — KETAMINE HCL IN 0.9 % NACL 50 MG/5 ML
SYRINGE (ML) INTRAVENOUS
Status: DISPENSED
Start: 2019-11-05